# Patient Record
Sex: FEMALE | Race: WHITE | NOT HISPANIC OR LATINO | Employment: FULL TIME | URBAN - METROPOLITAN AREA
[De-identification: names, ages, dates, MRNs, and addresses within clinical notes are randomized per-mention and may not be internally consistent; named-entity substitution may affect disease eponyms.]

---
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
## 2018-05-24 DIAGNOSIS — F41.9 ANXIETY: ICD-10-CM

## 2018-06-26 ENCOUNTER — OFFICE VISIT (OUTPATIENT)
Dept: FAMILY MEDICINE | Facility: CLINIC | Age: 18
End: 2018-06-26
Payer: COMMERCIAL

## 2018-06-26 VITALS
HEART RATE: 69 BPM | DIASTOLIC BLOOD PRESSURE: 72 MMHG | OXYGEN SATURATION: 100 % | SYSTOLIC BLOOD PRESSURE: 118 MMHG | BODY MASS INDEX: 22.35 KG/M2 | RESPIRATION RATE: 16 BRPM | WEIGHT: 147.5 LBS | HEIGHT: 68 IN | TEMPERATURE: 97.2 F

## 2018-06-26 DIAGNOSIS — T14.8XXA MUSCLE STRAIN: Primary | ICD-10-CM

## 2018-06-26 PROCEDURE — 99213 OFFICE O/P EST LOW 20 MIN: CPT | Performed by: FAMILY MEDICINE

## 2018-06-26 RX ORDER — SPIRONOLACTONE 100 MG/1
100 TABLET, FILM COATED ORAL DAILY
COMMUNITY
Start: 2018-04-22 | End: 2020-08-06

## 2018-06-26 RX ORDER — NAPROXEN 500 MG/1
500 TABLET ORAL 2 TIMES DAILY PRN
Qty: 30 TABLET | Refills: 1 | Status: SHIPPED | OUTPATIENT
Start: 2018-06-26 | End: 2019-06-03

## 2018-06-26 NOTE — MR AVS SNAPSHOT
After Visit Summary   6/26/2018    Mireya Yoon    MRN: [id number]           Patient Information     Date Of Birth          2000        Visit Information        Provider Department      6/26/2018 11:50 AM Stephani Bauman MD [facility]        Today's Diagnoses     Muscle strain    -  1      Care Instructions    [facility]    If you have any questions regarding to your visit please contact your care team:       Team Red:   Clinic Hours Telephone Number   Dr. Aisha Alcaraz, NP   7am-7pm  Monday - Thursday   7am-5pm  Fridays  [phone]  (Appointment scheduling available 24/7)    Questions about your recent visit?   Team Line  [phone]   Urgent Care - [place] and [facility] - 11am-9pm Monday-Friday Saturday-Sunday- 9am-5pm   [place] - 5pm-9pm Monday-Friday Saturday-Sunday- 9am-5pm  [phone] - [place]  [phone] - [place]       What options do I have for a visit other than an office visit? We offer electronic visits (e-visits) and telephone visits, when medically appropriate.  Please check with your medical insurance to see if these types of visits are covered, as you will be responsible for any charges that are not paid by your insurance.      You can use [place] (secure electronic communication) to access to your chart, send your primary care provider a message, or make an appointment. Ask a team member how to get started.     For a price quote for your services, please call our Consumer Price Line at [phone] or our Imaging Cost estimation line at [phone] (for imaging tests).              Follow-ups after your visit        Who to contact     If you have questions or need follow up information about today's clinic visit or your schedule please contact [facility] directly at [phone].  Normal or non-critical lab and imaging results will be communicated to you by  "MyChart, letter or phone within 4 business days after the clinic has received the results. If you do not hear from us within 7 days, please contact the clinic through MyChart or phone. If you have a critical or abnormal lab result, we will notify you by phone as soon as possible.  Submit refill requests through [facility] or call your pharmacy and they will forward the refill request to us. Please allow 3 business days for your refill to be completed.          Additional Information About Your Visit        Care EveryWhere ID     This is your Care EveryWhere ID. This could be used by other organizations to access your [facility] medical records  [id number]        Your Vitals Were     Pulse Temperature Respirations Height Pulse Oximetry BMI (Body Mass Index)    69 97.2  F (36.2  C) (Oral) 16 5' 7.72\" (1.72 m) 100% 22.62 kg/m2       Blood Pressure from Last 3 Encounters:   06/26/18 118/72   07/19/17 120/72   12/13/16 124/74    Weight from Last 3 Encounters:   06/26/18 147 lb 8 oz (66.9 kg)   07/19/17 157 lb 12.8 oz (71.6 kg) (87 %)*   12/13/16 165 lb 3.2 oz (74.9 kg) (91 %)*     * Growth percentiles are based on CDC 2-20 Years data.              Today, you had the following     No orders found for display         Today's Medication Changes          These changes are accurate as of 6/26/18 12:17 PM.  If you have any questions, ask your nurse or doctor.               Start taking these medicines.        Dose/Directions    naproxen 500 MG tablet   Commonly known as:  NAPROSYN   Used for:  Muscle strain   Started by:  Stephani Bauman MD        Dose:  500 mg   Take 1 tablet (500 mg) by mouth 2 times daily as needed for moderate pain   Quantity:  30 tablet   Refills:  1            Where to get your medicines      These medications were sent to [facility] [address] [facility] [address] [facility] [address], [facility] [address]     Phone:  [phone]     naproxen 500 MG tablet       "          Primary Care Provider Office Phone # Fax #    Alvin Franklin [phone] [address] [facility] [address]        Equal Access to Services     AMBIKAXIOMARA ELDER : Yamileth eric ken ginnyleeann Arlette, wachingda luqadaha, qaybta kaieshada fozia, davonte haddad laStephanietucker wilson. So [facility] [phone].    ATENCIÓN: Si habla español, tiene a groves disposición servicios gratuitos de asistencia lingüística. Llame al [phone].    We comply with applicable federal civil rights laws and [place] laws. We do not discriminate on the basis of race, color, national origin, age, disability, sex, sexual orientation, or gender identity.            Thank you!     Thank you for choosing [facility]  for your care. Our goal is always to provide you with excellent care. Hearing back from our patients is one way we can continue to improve our services. Please take a few minutes to complete the written survey that you may receive in the mail after your visit with us. Thank you!             Your Updated Medication List - Protect others around you: Learn how to safely use, store and throw away your medicines at www.disposemymeds.org.          This list is accurate as of 6/26/18 12:17 PM.  Always use your most recent med list.                   Brand Name Dispense Instructions for use Diagnosis    etonogestrel 68 MG Impl    IMPLANON/NEXPLANON     1 each (68 mg) by Subdermal route continuous    Nexplanon insertion       ibuprofen 600 MG tablet    ADVIL/MOTRIN    40 tablet    Take 1 tablet (600 mg) by mouth every 8 hours as needed for moderate pain or pain        naproxen 500 MG tablet    NAPROSYN    30 tablet    Take 1 tablet (500 mg) by mouth 2 times daily as needed for moderate pain    Muscle strain       sertraline 50 MG tablet    ZOLOFT    60 tablet    Take 1 tablet (50 mg) by mouth daily    Anxiety       spironolactone 100 MG tablet    ALDACTONE          vitamin D3 2000 units Caps

## 2018-06-26 NOTE — LETTER
[facility]  [address] [place] [address]  [place] [address]  [phone]          June 26, 2018    RE:  Mireya Yoon                                                                                                                                                       [address] [facility] [address]            To whom it may concern:    Mireya Yoon is under my professional care .She was seen today.  Avoid heavy lifting/bending 1 week.    Sincerely,        Stephani Bauman MD

## 2018-06-26 NOTE — PROGRESS NOTES
SUBJECTIVE:   Mireya Yoon is a 18 year old female who presents to clinic today for the following health issues:      Left inguinal area pain    Onset: Patient states she did Gymnastics and Swimming in high school and it started to get irritated then, patient states it would hurt on and off but lately it has been hurting a lot more for a longer period of time. Patient has tired exercising which helped back then but now it has not been helping at all, patient states it hurts more when going up the stairs and walking. Patient walks, bends, carries, and moves a lot at work which has been making it worse.     Description:   Location: Left side hip area   Character: Dull ache and shooting pain     Intensity: moderate.    Progression of Symptoms: better, worse    Accompanying Signs & Symptoms:  Other symptoms: numbness and tingling    History:   Previous similar pain: no       Precipitating factors:   Trauma or overuse: no     Alleviating factors:  Improved by: Advil    Therapies Tried and outcome: Advil, exercising   Pt was Jumping on a Trampoline          Problem list and histories reviewed & adjusted, as indicated.  Additional history: as documented    Patient Active Problem List   Diagnosis     Iron deficiency anemia     Pain in joint, lower leg     Dysmenorrhea     Nexplanon in place     Chest pain, unspecified chest pain type     Anxiety     History reviewed. No pertinent surgical history.    Social History   Substance Use Topics     Smoking status: Never Smoker     Smokeless tobacco: Never Used     Alcohol use No     Family History   Problem Relation Age of Onset     Hypertension Mother      Glaucoma No family hx of      Macular Degeneration No family hx of          Current Outpatient Prescriptions   Medication Sig Dispense Refill     Cholecalciferol (VITAMIN D3) 2000 UNITS CAPS        etonogestrel (IMPLANON/NEXPLANON) 68 MG IMPL 1 each (68 mg) by Subdermal route continuous  0     ibuprofen (ADVIL,MOTRIN) 600 MG  "tablet Take 1 tablet (600 mg) by mouth every 8 hours as needed for moderate pain or pain 40 tablet 0     naproxen (NAPROSYN) 500 MG tablet Take 1 tablet (500 mg) by mouth 2 times daily as needed for moderate pain 30 tablet 1     sertraline (ZOLOFT) 50 MG tablet Take 1 tablet (50 mg) by mouth daily 60 tablet 1     spironolactone (ALDACTONE) 100 MG tablet        No Known Allergies  Recent Labs   Lab Test  09/17/14 2015   TSH  6.54*      BP Readings from Last 3 Encounters:   06/26/18 118/72   07/19/17 120/72   12/13/16 124/74    Wt Readings from Last 3 Encounters:   06/26/18 147 lb 8 oz (66.9 kg)   07/19/17 157 lb 12.8 oz (71.6 kg) (87 %)*   12/13/16 165 lb 3.2 oz (74.9 kg) (91 %)*     * Growth percentiles are based on [facility] 2-20 Years data.                  Labs reviewed in EPIC    Reviewed and updated as needed this visit by clinical staff       Reviewed and updated as needed this visit by Provider         ROS:  Rest of the  Pertinent ROS is Negative except see above and Problem list [stable]      OBJECTIVE:     /72  Pulse 69  Temp 97.2  F (36.2  C) (Oral)  Resp 16  Ht 5' 7.72\" (1.72 m)  Wt 147 lb 8 oz (66.9 kg)  SpO2 100%  BMI 22.62 kg/m2  Body mass index is 22.62 kg/(m^2).  GENERAL: healthy, alert and no distress  ABDOMEN: soft, nontender, no hepatosplenomegaly, no masses and bowel sounds normal  MS: no gross musculoskeletal defects noted, no edema  MS: mild tenderness left Inguinal Ligament area  No swelling  Pain with movement    Diagnostic Test Results:  none     ASSESSMENT/PLAN:       1. Muscle strain  SEE EPIC care orders  The potential side effects of this medication have been discussed with the patient.  Call if any significant problems with these are experienced.    - naproxen (NAPROSYN) 500 MG tablet; Take 1 tablet (500 mg) by mouth 2 times daily as needed for moderate pain  Dispense: 30 tablet; Refill: 1  Rest  Note for work  Follow up if not better  Stephani Bauman MD  [facility] " [place]

## 2018-06-26 NOTE — PATIENT INSTRUCTIONS
[facility]    If you have any questions regarding to your visit please contact your care team:       Team Red:   Clinic Hours Telephone Number   Dr. Aisha Alcaraz, NP   7am-7pm  Monday - Thursday   7am-5pm  Fridays  [phone]  (Appointment scheduling available 24/7)    Questions about your recent visit?   Team Line  [phone]   Urgent Care - [place] and [facility] - 11am-9pm Monday-Friday Saturday-Sunday- 9am-5pm   [place] - 5pm-9pm Monday-Friday Saturday-Sunday- 9am-5pm  [phone] - [place]  [phone] - [place]       What options do I have for a visit other than an office visit? We offer electronic visits (e-visits) and telephone visits, when medically appropriate.  Please check with your medical insurance to see if these types of visits are covered, as you will be responsible for any charges that are not paid by your insurance.      You can use [facility] (secure electronic communication) to access to your chart, send your primary care provider a message, or make an appointment. Ask a team member how to get started.     For a price quote for your services, please call our Consumer Price Line at [phone] or our Imaging Cost estimation line at [phone] (for imaging tests).

## 2018-07-05 ENCOUNTER — TELEPHONE (OUTPATIENT)
Dept: PEDIATRICS | Facility: CLINIC | Age: 18
End: 2018-07-05

## 2018-07-05 NOTE — TELEPHONE ENCOUNTER
Spoke to mom.  She has received a call from previous MD who is prescribing med.  No further action needed. Radha Naik RN

## 2018-12-13 ENCOUNTER — OFFICE VISIT (OUTPATIENT)
Dept: OBGYN | Facility: CLINIC | Age: 18
End: 2018-12-13
Payer: COMMERCIAL

## 2018-12-13 VITALS
TEMPERATURE: 97.7 F | BODY MASS INDEX: 25.45 KG/M2 | DIASTOLIC BLOOD PRESSURE: 73 MMHG | HEART RATE: 166 BPM | WEIGHT: 166 LBS | SYSTOLIC BLOOD PRESSURE: 127 MMHG

## 2018-12-13 DIAGNOSIS — Z30.017 INSERTION OF NEXPLANON: Primary | ICD-10-CM

## 2018-12-13 DIAGNOSIS — Z30.46 ENCOUNTER FOR NEXPLANON REMOVAL: ICD-10-CM

## 2018-12-13 PROCEDURE — 11983 REMOVE/INSERT DRUG IMPLANT: CPT | Performed by: OBSTETRICS & GYNECOLOGY

## 2018-12-13 NOTE — PROGRESS NOTES
GYN CLINIC VISIT  2018    CC: removal of Nexplanon    HPI:   Mireya Yoon is a 18 year old  who presents to clinic today to have her Nexplanon removed. It was inserted on 2016 in her left side arm. She desires Nexplanon removal because it is due for removal. She is home from college on break and would like it replaced with a new one. It is working well for her. Light irregular bleeding, decreased cramps.     Reviewed PMH, PSH, social and family history. Changes made in EPIC.    OBJECTIVE:  Vitals:    18 1305   BP: 127/73   Pulse: 166   Temp: 97.7  F (36.5  C)   TempSrc: Oral   Weight: 75.3 kg (166 lb)       Gen: alert, oriented, no distress, very pleasant    PROCEDURE: Nexplanon removal  Verbal and written consent obtained. Discussed risk of bleeding, infection, inability to remove device and bruising. Nexplanon device was palpated in her left side arm. The area was cleansed with betadine x3. Sterile gloves were donned. Then lidocaine 1% was injected under the skin at the distal end of the device. A 2mm incision was made with a scalpel over the distal end of the device, then the device was grasped with a sterile Najma clamp. The fibrous sheath encasing the Nexplanon was incised with a scalpel, then the device was removed without difficulty, intact. The incision was covered with a band-aid and the arm was wrapped with a pressure dressing. Patient tolerated procedure well.         PROCEDURE: Nexplanon Insertion  Before insertion it was confirmed that Mireya Yoon is not pregnant nor has any other contraindication for the use of Nexplanon (no known or suspected pregnancy, no current or past history of thrombosis or thromboembolic disorders, no liver tumors, no undiagnosed abnormal genital bleeding, no known or suspected breast cancer or progestin-sensitive cancer, and no allergic reaction to any components of nexplanon.)      She understands the benefits and risks of Nexplanon.  She is explained the  most common adverse reactions reported in clinical trials were change in menstrual bleeding pattern, headache, vaginitis, weight increase, acne, breast pain, abdominal pain, and pharyngitis. The patient has received a copy of the Patient Labeling included in packaging. Consent and been reviewed and completed. Patient has no allergies to the antiseptic and anesthetic to be used during insertion.      A single NEXPLANON implant was inserted subdermally in the upper left side arm.     After the Nexplanon was removed, the insertion area was anesthetized with an additional 2 mL of  1% lidocaine.  The skin was stretched at insertion site and the nexplanon applicator inserted at 30 degrees.  The position of the implant was confirmed immediately after insertion by palpation.  Bandage placed over implant site.  Mireya Yoon was able to palpate the implant herself.  Pressure bandage placed with a sterile guaze to minimize bruising.  The patient was instructed to remove the pressure dressing in 24 hours and place a normal bandage over the insertion site for 3-5 days post-insertion.  The patient tolerated the procedure well.    NDC 4684-2316-32  Lot [id number], Exp 2021     ASSESSMENT:  18 year old  had a Nexplanon removed and new one inserted today in her LEFT upper arm without complication.    PLAN:  1. Insertion of Nexplanon  - etonogestrel (IMPLANON/NEXPLANON) subdermal implant 68 mg; 1 each (68 mg) by Subdermal route continuous  - INSERTION NON-BIODEGRADABLE DRUG DELIVERY IMPLANT  - ETONOGESTREL IMPLANT SYSTEM    2. Encounter for Nexplanon removal  - REMOVAL NEXPLANON    Harriett Wiggins MD

## 2018-12-13 NOTE — NURSING NOTE
"Chief Complaint   Patient presents with     Nexplanon removal and Replace     NDC:6077-0910-27 LOT# [id number] EXP: 2021       Initial /73   Pulse 166   Temp 97.7  F (36.5  C) (Oral)   Wt 75.3 kg (166 lb)   BMI 25.45 kg/m   Estimated body mass index is 25.45 kg/m  as calculated from the following:    Height as of 18: 1.72 m (5' 7.72\").    Weight as of this encounter: 75.3 kg (166 lb).  BP completed using cuff size: regular    Questioned patient about current smoking habits.  Pt. has never smoked.          The following HM Due: NONE      The following patient reported/Care Every where data was sent to:  P ABSTRACT QUALITY INITIATIVES [62312]  NA      n/a              "

## 2018-12-18 ENCOUNTER — OFFICE VISIT (OUTPATIENT)
Dept: PEDIATRICS | Facility: CLINIC | Age: 18
End: 2018-12-18
Payer: COMMERCIAL

## 2018-12-18 VITALS
HEIGHT: 67 IN | BODY MASS INDEX: 25.49 KG/M2 | TEMPERATURE: 98.3 F | WEIGHT: 162.4 LBS | DIASTOLIC BLOOD PRESSURE: 83 MMHG | SYSTOLIC BLOOD PRESSURE: 123 MMHG | HEART RATE: 83 BPM

## 2018-12-18 DIAGNOSIS — Z23 ENCOUNTER FOR IMMUNIZATION: ICD-10-CM

## 2018-12-18 DIAGNOSIS — F41.1 GAD (GENERALIZED ANXIETY DISORDER): ICD-10-CM

## 2018-12-18 DIAGNOSIS — Z00.00 HEALTHCARE MAINTENANCE: Primary | ICD-10-CM

## 2018-12-18 DIAGNOSIS — E61.1 IRON DEFICIENCY: ICD-10-CM

## 2018-12-18 LAB — HGB BLD-MCNC: 12.8 G/DL (ref 11.7–15.7)

## 2018-12-18 PROCEDURE — 36415 COLL VENOUS BLD VENIPUNCTURE: CPT | Performed by: PEDIATRICS

## 2018-12-18 PROCEDURE — 90471 IMMUNIZATION ADMIN: CPT | Performed by: PEDIATRICS

## 2018-12-18 PROCEDURE — 99213 OFFICE O/P EST LOW 20 MIN: CPT | Mod: 25 | Performed by: PEDIATRICS

## 2018-12-18 PROCEDURE — 87491 CHLMYD TRACH DNA AMP PROBE: CPT | Performed by: PEDIATRICS

## 2018-12-18 PROCEDURE — 83550 IRON BINDING TEST: CPT | Performed by: PEDIATRICS

## 2018-12-18 PROCEDURE — 85018 HEMOGLOBIN: CPT | Performed by: PEDIATRICS

## 2018-12-18 PROCEDURE — 90686 IIV4 VACC NO PRSV 0.5 ML IM: CPT | Performed by: PEDIATRICS

## 2018-12-18 PROCEDURE — 99395 PREV VISIT EST AGE 18-39: CPT | Mod: 25 | Performed by: PEDIATRICS

## 2018-12-18 PROCEDURE — 87591 N.GONORRHOEAE DNA AMP PROB: CPT | Performed by: PEDIATRICS

## 2018-12-18 PROCEDURE — 83540 ASSAY OF IRON: CPT | Performed by: PEDIATRICS

## 2018-12-18 RX ORDER — SERTRALINE HYDROCHLORIDE 100 MG/1
100 TABLET, FILM COATED ORAL DAILY
Qty: 90 TABLET | Refills: 3 | Status: SHIPPED | OUTPATIENT
Start: 2018-12-18 | End: 2019-06-03 | Stop reason: DRUGHIGH

## 2018-12-18 ASSESSMENT — ANXIETY QUESTIONNAIRES
3. WORRYING TOO MUCH ABOUT DIFFERENT THINGS: NEARLY EVERY DAY
2. NOT BEING ABLE TO STOP OR CONTROL WORRYING: NEARLY EVERY DAY
IF YOU CHECKED OFF ANY PROBLEMS ON THIS QUESTIONNAIRE, HOW DIFFICULT HAVE THESE PROBLEMS MADE IT FOR YOU TO DO YOUR WORK, TAKE CARE OF THINGS AT HOME, OR GET ALONG WITH OTHER PEOPLE: VERY DIFFICULT
1. FEELING NERVOUS, ANXIOUS, OR ON EDGE: NEARLY EVERY DAY
7. FEELING AFRAID AS IF SOMETHING AWFUL MIGHT HAPPEN: MORE THAN HALF THE DAYS
GAD7 TOTAL SCORE: 16
5. BEING SO RESTLESS THAT IT IS HARD TO SIT STILL: NOT AT ALL
6. BECOMING EASILY ANNOYED OR IRRITABLE: NEARLY EVERY DAY

## 2018-12-18 ASSESSMENT — MIFFLIN-ST. JEOR: SCORE: 1536.88

## 2018-12-18 ASSESSMENT — PATIENT HEALTH QUESTIONNAIRE - PHQ9: 5. POOR APPETITE OR OVEREATING: MORE THAN HALF THE DAYS

## 2018-12-18 NOTE — PROGRESS NOTES
SUBJECTIVE:   Mireya Yoon is a 18 year old female, here for a routine health maintenance visit,   accompanied by her mother.    Patient was roomed by: PHOENIX Alex    Do you have any forms to be completed?  no    SOCIAL HISTORY  Family members in house: mother, father and 3 brothers  Language(s) spoken at home: English  Recent family changes/social stressors: school    SAFETY/HEALTH RISKS  TB exposure:           None  Cardiac risk assessment:     Family history (males <55, females <65) of angina (chest pain), heart attack, heart surgery for clogged arteries, or stroke: no    Biological parent(s) with a total cholesterol over 240:  no    DENTAL  Water source:  city water  Does your child have a dental provider: Yes  Has your child seen a dentist in the last 6 months: Yes  Dental health HIGH risk factors: none    Dental visit recommended: Yes    Sports Physical:  No sports physical needed.    VISION :  Testing not done; patient has seen eye doctor in the past 12 months.    HEARING   Right Ear:      1000 Hz RESPONSE- on Level: 40 db (Conditioning sound)   1000 Hz: RESPONSE- on Level:   20 db    2000 Hz: RESPONSE- on Level:   20 db    4000 Hz: RESPONSE- on Level:   20 db    6000 Hz: RESPONSE- on Level:   20 db     Left Ear:      6000 Hz: RESPONSE- on Level:   20 db    4000 Hz: RESPONSE- on Level:   20 db    2000 Hz: RESPONSE- on Level:   20 db    1000 Hz: RESPONSE- on Level:   20 db      500 Hz: RESPONSE- on Level: 25 db    Right Ear:       500 Hz: RESPONSE- on Level: 25 db    Hearing Acuity: Pass    Hearing Assessment: normal    HOME  No concerns    EDUCATION  School:  [employer], later [employer]  Days of school missed: 5 or fewer    SAFETY  Driving:  Seat belt always worn:  Yes  Helmet worn for bicycle/roller blades/skateboard:  NO  Guns/firearms in the home: No  No safety concerns    ACTIVITIES  Do you get at least 60 minutes per day of physical activity, including time in and out of school: NO  Extracurricular  activities: none  Organized team sports: none    ELECTRONIC MEDIA  Media use: >2 hours/ day    DIET  Do you get at least 4 helpings of a fruit or vegetable every day: Yes  How many servings of juice, non-diet soda, punch or sports drinks per day: none    PSYCHO-SOCIAL/DEPRESSION  General screening:  Electronic PSC No flowsheet data found.   no followup necessary  No concerns     SLEEP  Sleep concerns: Sleeping concerns, pt is sleeping a lot and is always tired. Pt wants iron/hemoglobin check.   Bedtime on a school night: none  Wake up time for school: Depends on the classes   Sleep duration on a school night (hours/night): During school: 8 hours.   Difficulty shutting off thoughts at night: No  Daytime naps: YES    QUESTIONS/CONCERNS:  -Has a history of low hemoglobin and low iron, in the past when she has started to chew on ice she has known that her iron was low.  This behavior has increased recently.  She did start taking the ferrous sulfate compound that she bought at the [employer] while at school but is unsure of how much elemental iron was in the product.  She does not think that this product help to improve her eyestrain behaviors.  She feels moderately fatigued.  No abnormal bruising.  Her menstrual period is light to moderate flow.    DRUGS  Smoking:  no  Passive smoke exposure:  no  Alcohol:  no  Drugs:  no    HEADS:  HOME: Currently home for the holidays and living with her parents, however she currently attends school in [employer] which she enjoys immensely.  She lives in a quad with 4 other females 1 of which is her good friends.  She reports feeling safe in this environment although she wishes her roommates were .    EDUCATION/EMPLOYMENT:  Currently in her third year of college, majoring in psychology and planning to get her PhD and become an early childhood psychologist.  Her mother worked in this field and she feels it will be a good fit for her especially given her history with anxiety.   She states that she overall does very well in school and is in follow-up with both the honor society as well as multiple multiple leadership programs.  She plans to finish college in 4 years and either take a year off or move on to graduate school at that time.    ACTIVITIES  - She is currently active through rec sports at her college, she states that she Goes to spin classes and walks often with her boyfriend around campus.  She thinks that she exercises at least 3 days a week.  She is also very involved in extracurricular activities per report.  - She states that she has multiple close friends however her for closest friends have recently become less close as she has found them to be more irritating than previously.  She states that when she feels irritated she leaves and goes to a coffee shop which she finds to be a safe space.  She feels that overall these relationships have been stable although not as good as it used to be.    ANTONIO Gomes cooks most of her own meals and the apartment in her kitchen and feels that her diet has significantly improved since leaving the dorms and getting her own kitchen  She feels that she eats lots of vegetables and really enjoys cooking for herself and her boyfriend.  She lives close to a grocery store.     DRUGS  Denies any substance abuse, including tobacco, marijuana, very minimal EtOH intake as she does not like the way it makes her feel.  She does occasionally have a glass of wine.  She denies any prescription drug use or other substance use    SEXUALITY  -currently sexually active with her boyfriend who she has been with for greater than 1 year.  She currently has a Nexplanon for pregnancy prevention as well as using condoms consistently.  She feels that she uses them 100% of the time.  She reports feeling safe in this relationship and that both she and her boyfriend have very healthy relationship in which they both grow together and I working towards success in independent  yet productive ways.  -She does report intermittently a feeling of cramping and pain near her right inguinal canal during intercourse.  This is intermittent and she is unsure of how long it lasts but it usually happens with penile insertion and improves after intercourse is completed.  -She was most recently tested for STI's in September through the school program.  Her partner was also tested at this time.  She has no concerns for STI's today, but agrees to testing.  -She did recently have her Nexplanon replaced and had some cramping for the last couple days after this procedure which has slowly started to improve.  -Last menstrual menstrual period was in the middle of October although this is not unusual for her as she does not get periods related to her Nexplanon.  She has no concerns about being pregnant at this time.  -She does not report any non-consensual sexual encounters.    SUICIDALITY/DEPRESSION/SAFETY  Mireya does have a history of generalized anxiety disorder.  She reports that this became much worse this summer while she was at home at which time she sought care from a nurse practitioner at primary care.  At that time she was prescribed both her continued sertraline as well as hydroxyzine.  She also initiated therapy with a psychologist here in the [facility], she continued this relationship with new therapists at school who she states are mostly helpful.  She has continued to take her sertraline although she recently ran out of refills and has been taking 50 mg for the last 2 weeks instead of 100 mg.  She feels this medication is helpful in general and she has not needed to use her Atarax in several weeks.  -She identifies both her mother and her boyfriend as her go to people for when she is feeling anxious, agitated, sad.  -She denies SI, HI, self-injurious behaviors.  She states her mood is typically good, she does not have periods of significant sadness.   -Overall she feels that her anxiety is well  controlled at this time.    MENSTRUAL HISTORY  Normal       PROBLEM LIST  Patient Active Problem List   Diagnosis     Iron deficiency anemia     Pain in joint, lower leg     Dysmenorrhea     Nexplanon in place     Chest pain, unspecified chest pain type     Anxiety     MEDICATIONS  Current Outpatient Medications   Medication Sig Dispense Refill     sertraline (ZOLOFT) 100 MG tablet Take 1 tablet (100 mg) by mouth daily 90 tablet 3     Cholecalciferol (VITAMIN D3) 2000 UNITS CAPS        etonogestrel (IMPLANON/NEXPLANON) 68 MG IMPL 1 each (68 mg) by Subdermal route continuous  0     ibuprofen (ADVIL,MOTRIN) 600 MG tablet Take 1 tablet (600 mg) by mouth every 8 hours as needed for moderate pain or pain 40 tablet 0     naproxen (NAPROSYN) 500 MG tablet Take 1 tablet (500 mg) by mouth 2 times daily as needed for moderate pain 30 tablet 1     sertraline (ZOLOFT) 50 MG tablet Take 1 tablet (50 mg) by mouth daily 60 tablet 1     spironolactone (ALDACTONE) 100 MG tablet         ALLERGY  No Known Allergies    IMMUNIZATIONS  Immunization History   Administered Date(s) Administered     DTAP (<7y) 1998, 1998, 01/06/1999, 10/06/1999, 09/03/2003     HEPA 08/19/2011, 09/01/2015     HPV 09/17/2009, 11/19/2010, 09/28/2012     HepB 1998, 1998, 04/14/1999     Hib (PRP-T) 1998, 1998, 01/06/1999, 10/06/1999     Influenza (IIV3) PF 02/20/2007, 09/17/2009, 11/19/2010     Influenza Intranasal Vaccine 09/28/2012     Influenza Vaccine IM 3yrs+ 4 Valent IIV4 09/17/2014, 11/04/2015, 12/18/2018     MMR 07/28/1999, 08/23/2002     Meningococcal (Bexsero ) 07/19/2017     Meningococcal (Menactra ) 07/19/2017     Meningococcal (Menomune ) 08/19/2011, 09/28/2012     Poliovirus, inactivated (IPV) 1998, 1998, 10/06/1999, 09/03/2003     Tdap (Adacel,Boostrix) 09/17/2009     Varicella 07/28/1999, 08/25/2008       HEALTH HISTORY SINCE LAST VISIT  No surgery, major illness or injury since last physical  "exam    ROS  Constitutional, eye, ENT, skin, respiratory, cardiac, GI, MSK, neuro, and allergy are normal except as otherwise noted.    OBJECTIVE:   EXAM  /83   Pulse 83   Temp 98.3  F (36.8  C) (Oral)   Ht 5' 6.85\" (1.698 m)   Wt 162 lb 6.4 oz (73.7 kg)   LMP 11/01/2018 (Within Days)   Breastfeeding? No   BMI 25.55 kg/m    Normalized stature-for-age data not available for patients older than 20 years.  Normalized weight-for-age data not available for patients older than 20 years.  Normalized BMI data available only for age 0 to 20 years.  Normalized stature-for-age data not available for patients older than 20 years.  GENERAL: Active, alert, in no acute distress. mildly anxious  SKIN: Clear. No significant rash, abnormal pigmentation or lesions. Bruise on left bicep at nexplanon insertion site.  HEAD: Normocephalic  EYES: Pupils equal, round, reactive, Extraocular muscles intact. Normal conjunctivae. Hyperchromatic birth flor on lateral right iris  EARS: Normal canals. Tympanic membranes are normal; gray and translucent.  NOSE: Normal without discharge.  MOUTH/THROAT: Clear. No oral lesions. Teeth without obvious abnormalities.  NECK: Supple, no masses.  No thyromegaly.  LYMPH NODES: No adenopathy  LUNGS: Clear. No rales, rhonchi, wheezing or retractions  HEART: Regular rhythm. Normal S1/S2. No murmurs. Normal pulses.  ABDOMEN: Soft, non-tender, not distended, no masses or hepatosplenomegaly. Bowel sounds normal.   EXTREMITIES: Full range of motion, no deformities  PSYCH: Slightly anxious, but calm appearing.  Interactive.  Logical thought processes without tangential thoughts.  No pressured speech.  Insight into feelings intact.    Hg 12.8  Iron studies: pending  STI Gonorrhea/Chlamydia: pending    ASSESSMENT/PLAN:   1. Iron deficiency  History of iron deficiency, recent fatigue and extreme behaviors will check iron levels today and start supplementation if low.  - Hemoglobin  - Iron and iron binding " capacity    2. Healthcare maintenance  Given history of sexual activity will collect today.   - Neisseria gonorrhoeae PCR  - Chlamydia trachomatis PCR    3. MARY (generalized anxiety disorder)  Overall well controlled has not needed rescue medications in several months.  Encouraged patient to check in with nurse practitioner who she saw this summer as well as continuing with her psychologist at school when she returns for the spring semester.  Patient seems to be doing well overall and was able to identify several coping mechanisms to manage her anxiety.  - sertraline (ZOLOFT) 100 MG tablet; Take 1 tablet (100 mg) by mouth daily  Dispense: 90 tablet; Refill: 3    4. Encounter for immunization  - ADMIN 1st VACCINE    5. Pain with intercourse: Discussed this with patient given mechanism and given description I feel this is likely a mechanical pain related to position.  Discussed options for changing position as well as stretching with the patient.  Given that she has multiple negative STI tests have very low suspicion for infectious concerns at this time.  Will collect GC swab just to be sure.     Anticipatory Guidance  The following topics were discussed:  SOCIAL/ FAMILY: Seems to have a good connection with both her mother and her other family members.  Encouraged identification of social supports and different geographical situations  NUTRITION: Discussed cooking healthy meals  HEALTH / SAFETY:  Feels safe in her environment encouraged safe behaviors.  SEXUALITY: In a committed relationship discussed STI testing as well as pregnancy prevention today.    Preventive Care Plan  Immunizations    See orders in EpicCare.  I reviewed the signs and symptoms of adverse effects and when to seek medical care if they should arise.  Referrals/Ongoing Specialty care: No   See other orders in EpicCare.  Cleared for sports:  Yes  BMI at Normalized BMI data available only for age 0 to 20 years.  No weight concerns.  Dyslipidemia  risk:    None    FOLLOW-UP:    in 1 year for a Preventive Care visit    Resources  HPV and Cancer Prevention:  What Parents Should Know  What Kids Should Know About HPV and Cancer  Goal Tracker: Be More Active  Goal Tracker: Less Screen Time  Goal Tracker: Drink More Water  Goal Tracker: Eat More Fruits and Veggies  [place] Child and Teen Checkups (C&TC) Schedule of Age-Related Screening Standards      Latoya Lugo MD  Pediatrics, PL-2    Attending:  Patient seen, examined and discussed with resident.  Agree with above assessment and plan.  Spent 15 minutes of the visit in face-to face counseling regarding issues documented above in note by resident.      Alvin Franklin MD  [facility]

## 2018-12-19 LAB
C TRACH DNA SPEC QL NAA+PROBE: NEGATIVE
IRON SATN MFR SERPL: 17 % (ref 15–46)
IRON SERPL-MCNC: 49 UG/DL (ref 35–180)
N GONORRHOEA DNA SPEC QL NAA+PROBE: NEGATIVE
SPECIMEN SOURCE: NORMAL
SPECIMEN SOURCE: NORMAL
TIBC SERPL-MCNC: 295 UG/DL (ref 240–430)

## 2018-12-19 ASSESSMENT — ANXIETY QUESTIONNAIRES: GAD7 TOTAL SCORE: 16

## 2018-12-19 NOTE — PATIENT INSTRUCTIONS
-Continue taking sertraline (zoloft) 100mg every day  -Continue to see your therapist at school to work on strategies for coping with your anxiety  -We will call with the result of your iron studies and hemoglobin

## 2018-12-20 ENCOUNTER — TELEPHONE (OUTPATIENT)
Dept: PEDIATRICS | Facility: CLINIC | Age: 18
End: 2018-12-20

## 2018-12-20 NOTE — TELEPHONE ENCOUNTER
Reason for Call:  Request for results: Receive call from patient requesting lab results of recent testing    Name of test or procedure: labs completed 12/18/2018    Date of test of procedure: 12/18/2018    Location of the test or procedure: [facility]    OK to leave the result message on voice mail or with a family member? Not Applicable    Phone number Patient can be reached at:  Home number on file [phone] (home)    Additional comments: Please return call with lab results    Call taken on 12/20/2018 at 4:01 PM by Ileana Yoder

## 2019-02-20 ENCOUNTER — TELEPHONE (OUTPATIENT)
Dept: PEDIATRICS | Facility: CLINIC | Age: 19
End: 2019-02-20

## 2019-02-20 NOTE — TELEPHONE ENCOUNTER
Pt is wanting to speak with  or a RN about the abdominal pain. Mireya went to the ER for the pain and they had found a vaginal cyst. While in for her last visit at [facility] she had discuss with Dr. Franklin the pain she has when having intercourse. The pain has progressed and that is why she had gone to the ER. While at the ER they had advised pt to see a gynecologist, but patient is out of the state due to school. Mireya is wanting to discuss what her next steps should be and if Dr. Franklin is able to prescribe any medications for the pain. Please call Mireya to discuss at [phone].     Ida Whitaker,

## 2019-02-20 NOTE — TELEPHONE ENCOUNTER
Mom returning call from Dr. Franklin.  Please have Dr. Franklin call mom back at [phone].  Patient has given permission for mom to speak with the doctor. Thank you.    Prisca CEDILLO.  Patient Representative  [place]

## 2019-02-20 NOTE — TELEPHONE ENCOUNTER
"I called Mireya who shared that she went to the ED when she was having severe abdominal pain, and was found to have a 3.5 cm cyst on one ovary, and a burst cyst on the other.  I then told her that I wanted her to see a gyn doctor to see if the other cyst needs to be drained to avoid ovarian torsion.    I then called Mireya's mother (with her permission) and shared my thoughts.  I recommended that Mireya call her insurance and try to be seen by a gyn doctor in [place] as soon as she is able.  If, however, her insurance won't cover that visit, Mireya will make an appointment to see her gyn doctor in [place] for when she's home over spring break, which is in three weeks.  In the meantime, she understands that if she has severe abdominal pain, she needs to go to the ED again, since it could be a torsed ovary, and that can be a \"surgical emergency\".  Both Mireya and her mother agree with the plan.  "

## 2019-02-20 NOTE — TELEPHONE ENCOUNTER
Spoke to patient to r/o emergency abdominal symptoms but am unable to triage as patient is out of state. Able to walk and talk, no vomiting or fever. Not sever abdominal pain, pain is crampy.  Routing high priority to Dr. Franklin.    Debbie Spicer RN

## 2019-03-12 ENCOUNTER — ANCILLARY PROCEDURE (OUTPATIENT)
Dept: ULTRASOUND IMAGING | Facility: CLINIC | Age: 19
End: 2019-03-12
Attending: OBSTETRICS & GYNECOLOGY
Payer: COMMERCIAL

## 2019-03-12 ENCOUNTER — OFFICE VISIT (OUTPATIENT)
Dept: OBGYN | Facility: CLINIC | Age: 19
End: 2019-03-12
Payer: COMMERCIAL

## 2019-03-12 VITALS
DIASTOLIC BLOOD PRESSURE: 75 MMHG | OXYGEN SATURATION: 100 % | HEART RATE: 88 BPM | WEIGHT: 161 LBS | BODY MASS INDEX: 25.33 KG/M2 | SYSTOLIC BLOOD PRESSURE: 143 MMHG

## 2019-03-12 DIAGNOSIS — R10.2 PELVIC PAIN IN FEMALE: Primary | ICD-10-CM

## 2019-03-12 DIAGNOSIS — N83.202 LEFT OVARIAN CYST: ICD-10-CM

## 2019-03-12 PROCEDURE — 99203 OFFICE O/P NEW LOW 30 MIN: CPT | Performed by: OBSTETRICS & GYNECOLOGY

## 2019-03-12 PROCEDURE — 76830 TRANSVAGINAL US NON-OB: CPT | Performed by: OBSTETRICS & GYNECOLOGY

## 2019-03-12 NOTE — PROGRESS NOTES
SUBJECTIVE:  Mireya Yoon is a 18 year old  who presents to evaluate pelvic pain.  She goes to school in [employer], on  developed lower abdominal pain, on  presented to the emergency room.  Pain was described as right lower quadrant, but she reports that it was actually diffuse lower abdominal pain.  She also notes that she has for a long time been experiencing pain with intercourse and with insertion of tampons.  Extensive evaluation in the emergency department included a CT scan which showed a 3 cm adnexal left adnexal mass, and free fluid in the pelvis.  An ultrasound was done which showed a normal pelvis, including a 3 cm dominant follicle in the left ovary and no free fluid in the pelvis.  The appendix was visualized and was normal.  Pregnancy test was negative.  She came home to evaluate further as she reports that she still has pain all over her abdomen, can be sharp on the left, and is as bad as it was when she went to the emergency room.  She has had no fevers.  She has a Nexplanon which was changed in December.  She reports normal bowel movements, no vomiting.    Her sister and her mother accompany her.    OBJECTIVE: /75   Pulse 88   Wt 73 kg (161 lb)   LMP 2019   SpO2 100%   BMI 25.33 kg/m         Patient was not examined.    Transvaginal ultrasound showed a normal pelvis, with normal ovaries bilaterally, normal blood flow to the ovaries, no ovarian cysts.  Small follicles were seen in the ovaries.  A trace of fluid was noted in the cul-de-sac.    ASSESSMENT: Pelvic pain, likely ruptured ovarian cyst with small amount of free fluid, possibly blood in the pelvis, now reabsorbing.  No evidence of ovarian torsion.    PLAN: We discussed these findings in detail.  We discussed likely slow but certain improvement in her lower abdominal pain as her body reabsorbs any intra-abdominal fluid status post rupture of cyst.  She will monitor closely.  She is invited to  return here when she comes home for the summer if she wishes to further investigate the symptoms of dyspareunia and difficulty with tampon insertion that she has been experiencing.    Please note greater than 50% of this 30 minute appointment were spent in counseling with the patient of the issues described above in the history of present illness and in the plan, including evaluation and managment of pelvic pain, ovarian cyst.

## 2019-03-13 ENCOUNTER — OFFICE VISIT (OUTPATIENT)
Dept: OPTOMETRY | Facility: CLINIC | Age: 19
End: 2019-03-13
Payer: COMMERCIAL

## 2019-03-13 DIAGNOSIS — Z01.00 ENCOUNTER FOR EXAMINATION OF EYES AND VISION WITHOUT ABNORMAL FINDINGS: Primary | ICD-10-CM

## 2019-03-13 DIAGNOSIS — H52.13 MYOPIA OF BOTH EYES: ICD-10-CM

## 2019-03-13 DIAGNOSIS — H52.221 REGULAR ASTIGMATISM OF RIGHT EYE: ICD-10-CM

## 2019-03-13 PROCEDURE — 92015 DETERMINE REFRACTIVE STATE: CPT | Performed by: OPTOMETRIST

## 2019-03-13 PROCEDURE — 92004 COMPRE OPH EXAM NEW PT 1/>: CPT | Performed by: OPTOMETRIST

## 2019-03-13 ASSESSMENT — SLIT LAMP EXAM - LIDS
COMMENTS: NORMAL
COMMENTS: NORMAL

## 2019-03-13 ASSESSMENT — CONF VISUAL FIELD
OS_NORMAL: 1
OD_NORMAL: 1

## 2019-03-13 ASSESSMENT — TONOMETRY
OD_IOP_MMHG: 18
OS_IOP_MMHG: 18
IOP_METHOD: APPLANATION

## 2019-03-13 ASSESSMENT — REFRACTION_MANIFEST
OS_SPHERE: -3.50
OD_CYLINDER: +0.75
OS_CYLINDER: SPHERE
OD_AXIS: 015
OD_SPHERE: -3.00

## 2019-03-13 ASSESSMENT — VISUAL ACUITY
OS_CC: 20/20
METHOD: SNELLEN - LINEAR
OS_CC: 20/20
OS_SC: 20/400
OD_CC: 20/20
OS_SC: 20/20
OD_SC: 20/20
CORRECTION_TYPE: GLASSES
OD_SC: 20/400
OD_CC: 20/20
OS_CC+: -2

## 2019-03-13 ASSESSMENT — REFRACTION_WEARINGRX
OD_CYLINDER: +0.50
OD_SPHERE: -2.75
SPECS_TYPE: SVL
OS_SPHERE: -3.00
OS_CYLINDER: +0.75
OS_AXIS: 135
OD_AXIS: 005

## 2019-03-13 ASSESSMENT — EXTERNAL EXAM - LEFT EYE: OS_EXAM: NORMAL

## 2019-03-13 ASSESSMENT — CUP TO DISC RATIO
OD_RATIO: 0.2
OS_RATIO: 0.2

## 2019-03-13 ASSESSMENT — EXTERNAL EXAM - RIGHT EYE: OD_EXAM: NORMAL

## 2019-03-13 NOTE — LETTER
3/13/2019         RE: Mireya Yoon  [address]  [place] [address]        Dear Colleague,    Thank you for referring your patient, Mireya Yoon, to the [facility] ER. Please see a copy of my visit note below.    Chief Complaint   Patient presents with     Annual Eye Exam      Accompanied by self  Last Eye Exam: 1 year ago  Dilated Previously: Yes    What are you currently using to see?  Glasses- 1 year old and contacts, patient doesn't want to be fitted today-patient doesn't wear contacts that often.       Distance Vision Acuity: Noticed gradual change in both eyes    Near Vision Acuity: Satisfied with vision while reading  unaided    Eye Comfort: good  Do you use eye drops? : No  Occupation or Hobbies: student    Anne Marie Ruvalcaba, Opt[employer] Tech          Medical, surgical and family histories reviewed and updated 3/13/2019.       OBJECTIVE: See Ophthalmology exam    ASSESSMENT:    ICD-10-CM    1. Encounter for examination of eyes and vision without abnormal findings Z01.00 EYE EXAM (SIMPLE-NONBILLABLE)   2. Myopia of both eyes H52.13 EYE EXAM (SIMPLE-NONBILLABLE)     REFRACTION   3. Regular astigmatism of right eye H52.221 EYE EXAM (SIMPLE-NONBILLABLE)     REFRACTION      PLAN:     Patient Instructions   Mireya was advised of today's exam findings.  Optional to fill new glasses prescription, minimal change  Copy of glasses Rx provided today.  Return in 1-2 years for eye exam, or sooner if needed.    The affects of the dilating drops last for 4- 6 hours.  You will be more sensitive to light and vision will be blurry up close.  Mydriatic sunglasses were given if needed.      Sailnas Carlin O.D.  [facility]  [address] [place] [address]. [place]  [address]    [phone]           Again, thank you for allowing me to participate in the care of your patient.        Sincerely,        Salinas Carlin, OD

## 2019-03-13 NOTE — PATIENT INSTRUCTIONS
Mireya was advised of today's exam findings.  Optional to fill new glasses prescription, minimal change  Copy of glasses Rx provided today.  Return in 1-2 years for eye exam, or sooner if needed.    The affects of the dilating drops last for 4- 6 hours.  You will be more sensitive to light and vision will be blurry up close.  Mydriatic sunglasses were given if needed.      Salinas Carlin O.D.  [facility]  [place]  [address]    [phone]

## 2019-03-13 NOTE — PROGRESS NOTES
Chief Complaint   Patient presents with     Annual Eye Exam      Accompanied by self  Last Eye Exam: 1 year ago  Dilated Previously: Yes    What are you currently using to see?  Glasses- 1 year old and contacts, patient doesn't want to be fitted today-patient doesn't wear contacts that often.       Distance Vision Acuity: Noticed gradual change in both eyes    Near Vision Acuity: Satisfied with vision while reading  unaided    Eye Comfort: good  Do you use eye drops? : No  Occupation or Hobbies: student    Anne Marie Ruvalcaba, Optometric Tech          Medical, surgical and family histories reviewed and updated 3/13/2019.       OBJECTIVE: See Ophthalmology exam    ASSESSMENT:    ICD-10-CM    1. Encounter for examination of eyes and vision without abnormal findings Z01.00 EYE EXAM (SIMPLE-NONBILLABLE)   2. Myopia of both eyes H52.13 EYE EXAM (SIMPLE-NONBILLABLE)     REFRACTION   3. Regular astigmatism of right eye H52.221 EYE EXAM (SIMPLE-NONBILLABLE)     REFRACTION      PLAN:     Patient Instructions   Mireya was advised of today's exam findings.  Optional to fill new glasses prescription, minimal change  Copy of glasses Rx provided today.  Return in 1-2 years for eye exam, or sooner if needed.    The affects of the dilating drops last for 4- 6 hours.  You will be more sensitive to light and vision will be blurry up close.  Mydriatic sunglasses were given if needed.      Salinas Carlin O.D.  [address]  [address]    [phone]

## 2019-04-22 ENCOUNTER — TELEPHONE (OUTPATIENT)
Dept: PEDIATRICS | Facility: CLINIC | Age: 19
End: 2019-04-22

## 2019-04-22 NOTE — TELEPHONE ENCOUNTER
Spoke with Mireya. She had nexplanon placed in 2016 d/t severe cramps. She recently had it  replaced in December and since then has been experiencing cramps again.    She was on her period when she had it replaced, but is not sure when her LMP was (maybe had 1 since replacement). Mireya is wondering what she should do for the cramping? She is currently out of [place] for school and will be home in May for 2 weeks. I advised Mireya to make an appointment with Dr. Franklin at the time to discuss Nexplanon and cramps.     Will also route to covering providers to understand if there is anything else that should be done at this time. Is it less effective if she is having cramps/PMS symptoms? She said that this didn't happen before she had it replaced in December.     Mariah Maya RN, IBCLC

## 2019-04-22 NOTE — TELEPHONE ENCOUNTER
Reason for Call:  Other call back    Detailed comments: Mireya is wanting to speak with a RN about her Nexplanon. Please call patient to discuss/     Phone Number Patient can be reached at: Cell number on file:    Telephone Information:   Mobile [phone]       Best Time: Anytime    Can we leave a detailed message on this number? YES    Call taken on 4/22/2019 at 12:29 PM by Ida Whitaker

## 2019-04-23 NOTE — TELEPHONE ENCOUNTER
As it appears that Nex[facility] was [facility] by [facility] (Dr. Arias), I would recommend that Mireya call the [facility] to discuss what to do with Dr. Hickman, who placed the Nexplanon in December.

## 2019-04-23 NOTE — TELEPHONE ENCOUNTER
Called and advised Mireya to call the [facility]. She expressed understanding.  Piper Torre RN

## 2019-06-03 ENCOUNTER — OFFICE VISIT (OUTPATIENT)
Dept: FAMILY MEDICINE | Facility: CLINIC | Age: 19
End: 2019-06-03
Payer: COMMERCIAL

## 2019-06-03 VITALS
TEMPERATURE: 98.8 F | HEIGHT: 67 IN | HEART RATE: 76 BPM | WEIGHT: 163 LBS | BODY MASS INDEX: 25.58 KG/M2 | SYSTOLIC BLOOD PRESSURE: 138 MMHG | DIASTOLIC BLOOD PRESSURE: 70 MMHG

## 2019-06-03 DIAGNOSIS — Z00.00 ROUTINE HISTORY AND PHYSICAL EXAMINATION OF ADULT: Primary | ICD-10-CM

## 2019-06-03 DIAGNOSIS — G44.209 TENSION-TYPE HEADACHE, NOT INTRACTABLE, UNSPECIFIED CHRONICITY PATTERN: ICD-10-CM

## 2019-06-03 DIAGNOSIS — D50.9 IRON DEFICIENCY ANEMIA, UNSPECIFIED IRON DEFICIENCY ANEMIA TYPE: ICD-10-CM

## 2019-06-03 LAB
BASOPHILS # BLD AUTO: 0 10E9/L (ref 0–0.2)
BASOPHILS NFR BLD AUTO: 0.5 %
DIFFERENTIAL METHOD BLD: ABNORMAL
EOSINOPHIL # BLD AUTO: 0.2 10E9/L (ref 0–0.7)
EOSINOPHIL NFR BLD AUTO: 2.7 %
ERYTHROCYTE [DISTWIDTH] IN BLOOD BY AUTOMATED COUNT: 14.1 % (ref 10–15)
HCT VFR BLD AUTO: 35.4 % (ref 35–47)
HGB BLD-MCNC: 11.2 G/DL (ref 11.7–15.7)
LYMPHOCYTES # BLD AUTO: 2.2 10E9/L (ref 0.8–5.3)
LYMPHOCYTES NFR BLD AUTO: 28 %
MCH RBC QN AUTO: 25.3 PG (ref 26.5–33)
MCHC RBC AUTO-ENTMCNC: 31.6 G/DL (ref 31.5–36.5)
MCV RBC AUTO: 80 FL (ref 78–100)
MONOCYTES # BLD AUTO: 0.6 10E9/L (ref 0–1.3)
MONOCYTES NFR BLD AUTO: 8.2 %
NEUTROPHILS # BLD AUTO: 4.7 10E9/L (ref 1.6–8.3)
NEUTROPHILS NFR BLD AUTO: 60.6 %
PLATELET # BLD AUTO: 319 10E9/L (ref 150–450)
RBC # BLD AUTO: 4.43 10E12/L (ref 3.8–5.2)
WBC # BLD AUTO: 7.8 10E9/L (ref 4–11)

## 2019-06-03 PROCEDURE — 84443 ASSAY THYROID STIM HORMONE: CPT | Performed by: NURSE PRACTITIONER

## 2019-06-03 PROCEDURE — 99395 PREV VISIT EST AGE 18-39: CPT | Performed by: NURSE PRACTITIONER

## 2019-06-03 PROCEDURE — 82728 ASSAY OF FERRITIN: CPT | Performed by: NURSE PRACTITIONER

## 2019-06-03 PROCEDURE — 36415 COLL VENOUS BLD VENIPUNCTURE: CPT | Performed by: NURSE PRACTITIONER

## 2019-06-03 PROCEDURE — 85025 COMPLETE CBC W/AUTO DIFF WBC: CPT | Performed by: NURSE PRACTITIONER

## 2019-06-03 ASSESSMENT — ENCOUNTER SYMPTOMS
HEADACHES: 1
ARTHRALGIAS: 0
HEARTBURN: 0
FEVER: 0
PALPITATIONS: 0
DYSURIA: 0
EYE PAIN: 0
BREAST MASS: 0
NAUSEA: 0
DIARRHEA: 0
WEAKNESS: 0
CHILLS: 0
HEMATURIA: 0
NERVOUS/ANXIOUS: 0
FREQUENCY: 0
DIZZINESS: 0
SORE THROAT: 0
CONSTIPATION: 0
ABDOMINAL PAIN: 1
COUGH: 0
HEMATOCHEZIA: 0
SHORTNESS OF BREATH: 0
JOINT SWELLING: 0
MYALGIAS: 0
PARESTHESIAS: 0

## 2019-06-03 ASSESSMENT — MIFFLIN-ST. JEOR: SCORE: 1534.05

## 2019-06-03 NOTE — PATIENT INSTRUCTIONS
Preventive Health Recommendations  Female Ages 18 to 20     Yearly exam:     See your health care provider every year in order to  o Review health changes.   o Discuss preventive care.    o Review your medicines if your doctor has prescribed any.      You should be tested each year for STDs (sexually transmitted diseases).       After age 20, talk to your provider about how often you should have cholesterol testing.      If you are at risk for diabetes, you should have a diabetes test (fasting glucose).     Shots:     Get a flu shot each year.     Get a tetanus shot every 10 years.     Consider getting the shot (vaccine) that prevents cervical cancer (Gardasil).    Nutrition:     Eat at least 5 servings of fruits and vegetables each day.    Eat whole-grain bread, whole-wheat pasta and brown rice instead of white grains and rice.    Get adequate Calcium and Vitamin D.     Lifestyle    Exercise at least 150 minutes a week each week (30 minutes a day, 5 days a week). This will help you control your weight and prevent disease.    No smoking.     Wear sunscreen to prevent skin cancer.    See your dentist every six months for an exam and cleaning.  Patient Education     Mid-Cycle Pain (Mittelschmerz)  You have pain during the middle of your menstrual cycle or  mid-cycle pain . This is also called mittelschmerz, a Hungarian word meaning  middle pain.  Mid-cycle pain is caused by ovulation. Ovulation happens around the middle of the menstrual cycle when an egg is released from one of the ovaries. In some women, this causes sharp, cramping pain in one side of the lower abdomen. It can last from a few minutes to a day or so. You may feel the pain every month or only once in a while. The pain may also switch sides from month to month.  In most cases, mid-cycle pain is not a serious problem. Treatment is focused on symptom relief. If needed, medicine may be prescribed.    Home care  Medicines  You may use over-the-counter pain  medicine to control pain unless another pain medicine was prescribed. If the pain is severe and happens every month, you may be prescribed birth control pills to prevent ovulation.  General care  To help relieve pain, try these tips:  Rest as needed.  Apply a heating pad to where the pain is as directed. You may also use a hot water bottle.  Soak in a warm bath.  Follow-up care  Follow up with your healthcare provider, or as directed.    When to seek medical advice  Call your healthcare provider right away if any of these occur:  Fever of 100.4 F (38 C) or higher, or as directed by your provider  Pain gets worse or doesn t go away in 3 days  Pain occurs during urination or sex  Vaginal bleeding when it s not the normal time for your period  Unusual or foul-smelling vaginal discharge  Nausea or vomiting  Swelling of the abdomen  Pain that settles in the lower right part of the abdomen  Date Last Reviewed: 10/1/2017    3666-1376 [employer]. [address] [place] [address]. All rights reserved. This information is not intended as a substitute for professional medical care. Always follow your healthcare professional's instructions.           Patient Education     Rebound Headache    You use pain medicines called analgesics to treat your headaches. You are now having more frequent or intense headaches (rebound headaches). This is your body s response to too much pain medicine. Prescription pain medicines can cause these headaches. But so can over-the-counter medicines like acetaminophen or ibuprofen. A drug that contains caffeine or butalbital is most likely to cause rebound headache.  Symptoms of rebound headache include:  Mild to moderate headache for 15 or more days each month in a person with pre-existing headache disorder  Headache when you wake up that continues most of the day  Headaches getting worse over time  Need for more and more medicine to treat headaches  Your healthcare provider  can usually diagnose rebound headaches by your symptoms and medicine history. You may need tests to rule out other causes of your headaches. In the emergency room, you may be given a non-analgesic pain medicine to treat the pain or stop future headaches.  Home care  Treatment involves stopping use of your pain medicines. Your healthcare provider can tell you how to safely do this. You may be able to stop right away. Or you may need to take less and less over time (taper off). This will depend on the medicines you have been taking. To do this, follow the schedule that your provider gives you. If you are taking pain medicines for other types of pain at the same time, your healthcare provider may need other specialists to participate in your care.  For the first week or so after stopping, the headaches will likely get worse. You may also have withdrawal symptoms. These often include nausea, vomiting, and trouble sleeping. You may be given a medicine to help relieve pain and withdrawal symptoms. Take this exactly as you have been told. It is vital to avoid taking daily pain medicine. If you do so, rebound headaches will continue.  Caffeine can make rebound headaches worse. If you have caffeinated drinks every day, slowly cut your intake.  Keep a written log of your headaches. This can help you and your healthcare provider track your progress.  Be patient. It can take about 2 to 6 months to stop having rebound headaches.  Once you have broken the headache cycle, be careful not to start it again. Work with your provider to make a treatment plan for headache pain that has low risk of causing rebound headaches.  Relaxation can help lower tension and relieve pain. Try a massage, meditation, yoga, or other relaxation techniques. Or make time for a relaxing hobby that you enjoy.  Follow-up care  Follow up with your healthcare provider, or as advised.  When to seek medical advice  Call your healthcare provider right away if any  of these occur:  Fever of 100.4 F (38 C) or higher  Headaches that wake you from sleep  Repeated vomiting or visual problems that don't go away  Headache with a stiff neck, rash, confusion, weakness, numbness, seizure (convulsion), or trouble talking  Headache that starts after a head injury or fall  A type of headache you have never had before  Headache that gets worse despite rest and medicine  Date Last Reviewed: 4/1/2018 2000-2018 [employer]. [address] [place] [address]. All rights reserved. This information is not intended as a substitute for professional medical care. Always follow your healthcare professional's instructions.

## 2019-06-03 NOTE — PROGRESS NOTES
"   SUBJECTIVE:   CC: Mireya Yoon is an 18 year old woman who presents for preventive health visit.     Healthy Habits:     Getting at least 3 servings of Calcium per day:  Yes    Bi-annual eye exam:  Yes    Dental care twice a year:  Yes    Sleep apnea or symptoms of sleep apnea:  None    Diet:  Regular (no restrictions)    Frequency of exercise:  2-3 days/week    Duration of exercise:  30-45 minutes    Taking medications regularly:  Yes    Medication side effects:  Not applicable    PHQ-2 Total Score: 0    Additional concerns today:  Yes      PROBLEMS TO ADD ON...-      1) Would also like to have HGB checked? History of anemia   2) Follow-up from Feb and March on ovarian cyst? Was seen in the ER when she was away at school in [place] - was told she had ovarian cyst- when she saw OB/GYN in March 2019 they stated she had no cyst on her ultrasound.  Patient was good for a few weeks and then pain stated again about 2 weeks ago.  No pain today.  Low abdomen pain, quality: menstrual cramps that are \"deeper.\"  Ibuprofen does not really help.    3) Headache  Onset:  Years    Description:   Location:  All over   Character: throbbing pain, dull pain  Frequency:  Daily  Duration:  All day    Intensity: moderate, severe    Progression of Symptoms:  Improving from 2 weeks ago    Accompanying Signs & Symptoms:  Stiff neck: no  Neck or upper back pain: YES-  Sometimes- neck  Fever: no  Sinus pressure: YES  Nausea or vomiting: no   Dizziness: no  Numbness: no  Weakness: no  Visual changes: no    History:   Head trauma: no  Family history of migraines: YES-  Maybe mom?  Previous tests for headaches: YES-  Years ago- MRI  Neurologist evaluations: no  Able to do daily activities: no- not when severe  Wake with a headaches: YES  Do headaches wake you up: YES-  Bad ones do  Daily pain medication use: YES-  Advil  Work/school stressors/changes: YES-  School at times    Precipitating factors:   Does light make it worse: YES-  Bad " ones  Does sound make it worse: YES-  Bad ones    Alleviating factors:  Does sleep help: no     Therapies Tried and outcome: Ibuprofen (Advil, Motrin)      Today's PHQ-2 Score:   PHQ-2 ( 1999 Pfizer) 6/3/2019   Q1: Little interest or pleasure in doing things 0   Q2: Feeling down, depressed or hopeless 0   PHQ-2 Score 0   Q1: Little interest or pleasure in doing things Not at all   Q2: Feeling down, depressed or hopeless Not at all   PHQ-2 Score 0       Abuse: Current or Past(Physical, Sexual or Emotional)- No  Do you feel safe in your environment? Yes    Social History     Tobacco Use     Smoking status: Never Smoker     Smokeless tobacco: Never Used   Substance Use Topics     Alcohol use: No     Alcohol/week: 0.0 oz     If you drink alcohol do you typically have >3 drinks per day or >7 drinks per week? No    Alcohol Use 6/3/2019   Prescreen: >3 drinks/day or >7 drinks/week? No   Prescreen: >3 drinks/day or >7 drinks/week? -   No flowsheet data found.    Reviewed orders with patient.  Reviewed health maintenance and updated orders accordingly - Yes  BP Readings from Last 3 Encounters:   06/03/19 138/70   03/12/19 143/75   12/18/18 123/83    Wt Readings from Last 3 Encounters:   06/03/19 73.9 kg (163 lb)   03/12/19 73 kg (161 lb)   12/18/18 73.7 kg (162 lb 6.4 oz)                  Patient Active Problem List   Diagnosis     Iron deficiency anemia     Pain in joint, lower leg     Dysmenorrhea     Nexplanon in place     Chest pain, unspecified chest pain type     Anxiety     History reviewed. No pertinent surgical history.    Social History     Tobacco Use     Smoking status: Never Smoker     Smokeless tobacco: Never Used   Substance Use Topics     Alcohol use: No     Alcohol/week: 0.0 oz     Family History   Problem Relation Age of Onset     Hypertension Mother      Gallbladder Disease Mother      Unknown/Adopted Father      Diabetes Maternal Grandmother      Glaucoma No family hx of      Macular Degeneration No  "family hx of          Current Outpatient Medications   Medication Sig Dispense Refill     ibuprofen (ADVIL,MOTRIN) 600 MG tablet Take 1 tablet (600 mg) by mouth every 8 hours as needed for moderate pain or pain 40 tablet 0     sertraline (ZOLOFT) 50 MG tablet Take 1 tablet (50 mg) by mouth daily 60 tablet 1     spironolactone (ALDACTONE) 100 MG tablet Take 100 mg by mouth daily        No Known Allergies    Mammogram not appropriate for this patient based on age.    Pertinent mammograms are reviewed under the imaging tab.  History of abnormal Pap smear: NO - under age 21, PAP not appropriate for age     Reviewed and updated as needed this visit by clinical staff  Tobacco  Allergies  Meds         Reviewed and updated as needed this visit by Provider          Review of Systems   Constitutional: Negative for chills and fever.   HENT: Positive for congestion. Negative for ear pain, hearing loss and sore throat.    Eyes: Negative for pain and visual disturbance.   Respiratory: Negative for cough and shortness of breath.    Cardiovascular: Negative for chest pain, palpitations and peripheral edema.   Gastrointestinal: Positive for abdominal pain. Negative for constipation, diarrhea, heartburn, hematochezia and nausea.   Breasts:  Negative for tenderness, breast mass and discharge.   Genitourinary: Positive for pelvic pain. Negative for dysuria, frequency, genital sores, hematuria, urgency, vaginal bleeding and vaginal discharge.   Musculoskeletal: Negative for arthralgias, joint swelling and myalgias.   Skin: Negative for rash.   Neurological: Positive for headaches. Negative for dizziness, weakness and paresthesias.   Psychiatric/Behavioral: Negative for mood changes. The patient is not nervous/anxious.         OBJECTIVE:   /70 (BP Location: Right arm, Patient Position: Sitting, Cuff Size: Adult Regular)   Pulse 76   Temp 98.8  F (37.1  C) (Oral)   Ht 1.689 m (5' 6.5\")   Wt 73.9 kg (163 lb)   BMI 25.91 kg/m  "   Physical Exam  GENERAL: healthy, alert and no distress  EYES: Eyes grossly normal to inspection, PERRL and conjunctivae and sclerae normal  HENT: ear canals and TM's normal, nose and mouth without ulcers or lesions  NECK: no adenopathy, no asymmetry, masses, or scars and thyroid normal to palpation  RESP: lungs clear to auscultation - no rales, rhonchi or wheezes  BREAST: normal without masses, tenderness or nipple discharge and no palpable axillary masses or adenopathy  CV: regular rate and rhythm, normal S1 S2, no S3 or S4, no murmur, click or rub, no peripheral edema and peripheral pulses strong  ABDOMEN: soft, nontender, no hepatosplenomegaly, no masses and bowel sounds normal   (female): declined  MS: no gross musculoskeletal defects noted, no edema  SKIN: no suspicious lesions or rashes  NEURO: Normal strength and tone, mentation intact and speech normal  PSYCH: mentation appears normal, affect normal/bright    Diagnostic Test Results:  Labs reviewed in Epic  Results for orders placed or performed in visit on 06/03/19   TSH with free T4 reflex   Result Value Ref Range    TSH 2.48 0.40 - 4.00 mU/L   CBC with platelets and differential   Result Value Ref Range    WBC 7.8 4.0 - 11.0 10e9/L    RBC Count 4.43 3.8 - 5.2 10e12/L    Hemoglobin 11.2 (L) 11.7 - 15.7 g/dL    Hematocrit 35.4 35.0 - 47.0 %    MCV 80 78 - 100 fl    MCH 25.3 (L) 26.5 - 33.0 pg    MCHC 31.6 31.5 - 36.5 g/dL    RDW 14.1 10.0 - 15.0 %    Platelet Count 319 150 - 450 10e9/L    % Neutrophils 60.6 %    % Lymphocytes 28.0 %    % Monocytes 8.2 %    % Eosinophils 2.7 %    % Basophils 0.5 %    Absolute Neutrophil 4.7 1.6 - 8.3 10e9/L    Absolute Lymphocytes 2.2 0.8 - 5.3 10e9/L    Absolute Monocytes 0.6 0.0 - 1.3 10e9/L    Absolute Eosinophils 0.2 0.0 - 0.7 10e9/L    Absolute Basophils 0.0 0.0 - 0.2 10e9/L    Diff Method Automated Method        ASSESSMENT/PLAN:   1. Routine history and physical examination of adult    2. Tension-type headache, not  "intractable, unspecified chronicity pattern    - TSH with free T4 reflex  - CBC with platelets and differential  -Advised to limit ibuprofen use to 3 or less times per week to avoid rebound headaches.    3. Iron deficiency anemia, unspecified iron deficiency anemia type    - Ferritin in progress.    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    COUNSELING:  Reviewed preventive health counseling, as reflected in patient instructions       Regular exercise       Healthy diet/nutrition    Estimated body mass index is 25.91 kg/m  as calculated from the following:    Height as of this encounter: 1.689 m (5' 6.5\").    Weight as of this encounter: 73.9 kg (163 lb).    Weight management plan: Discussed healthy diet and exercise guidelines     reports that she has never smoked. She has never used smokeless tobacco.      Counseling Resources:  ATP IV Guidelines  Pooled Cohorts Equation Calculator  Breast Cancer Risk Calculator  FRAX Risk Assessment  ICSI Preventive Guidelines  Dietary Guidelines for Americans, 2010  USDA's MyPlate  ASA Prophylaxis  Lung CA Screening    Blanche Polanco NP  [facility]  "

## 2019-06-04 ENCOUNTER — TELEPHONE (OUTPATIENT)
Dept: FAMILY MEDICINE | Facility: CLINIC | Age: 19
End: 2019-06-04

## 2019-06-04 DIAGNOSIS — D50.9 IRON DEFICIENCY ANEMIA, UNSPECIFIED IRON DEFICIENCY ANEMIA TYPE: Primary | ICD-10-CM

## 2019-06-04 LAB
FERRITIN SERPL-MCNC: 16 NG/ML (ref 12–150)
TSH SERPL DL<=0.005 MIU/L-ACNC: 2.48 MU/L (ref 0.4–4)

## 2019-06-04 NOTE — TELEPHONE ENCOUNTER
Routing lab order request to provider for patient to recheck in 1 month. I pended a CBC with platelets and differential, would you like to order anything else?    Patient returns call to RN line. Relayed below message from provider with understanding voiced. Patient will call back to make the lab appointment.    Rosy Vincent RN

## 2019-06-04 NOTE — TELEPHONE ENCOUNTER
Please call patient with her lab results (she had requested a phone call, as she was leaving the state soon).    Thyroid screening is normal.  Hemoglobin slightly low and ferritin slightly below ideal range of > 30.    This suggests a mild iron deficiency.  Please review iron rich foods.  She can start an over the counter iron supplement with 65 mg elemental iron three times a week (Monday, Wednesday, Friday) and then have her CBC/ferritin rechecked in a month.    Blanche Polanco, DNP, APRN, CNP

## 2019-06-04 NOTE — LETTER
[address] [id number] [phone]      September 10, 2019      Mireya Yoon  [address]  [place] [address]          Dear Mireya Yoon:    This is to remind you that your provider wanted you to return to the clinic for lab testing.    If you are coming in for Lipids and/or Glucose testing please fast for 10-12 hours. Morning medications can be taken with water.    You may call our office at [phone] to schedule an appointment.    Please disregard this notice if you have already had your labs drawn or made an            appointment.        Sincerely,    Blanche Polanco NP

## 2019-06-04 NOTE — TELEPHONE ENCOUNTER
Patient/family was instructed to return call to [facility] RN directly on the RN Call back line at [phone].     Rosy Vincent RN

## 2019-12-30 ENCOUNTER — OFFICE VISIT (OUTPATIENT)
Dept: FAMILY MEDICINE | Facility: CLINIC | Age: 19
End: 2019-12-30
Payer: COMMERCIAL

## 2019-12-30 VITALS
TEMPERATURE: 98 F | WEIGHT: 167 LBS | BODY MASS INDEX: 26.21 KG/M2 | HEIGHT: 67 IN | SYSTOLIC BLOOD PRESSURE: 110 MMHG | DIASTOLIC BLOOD PRESSURE: 68 MMHG | HEART RATE: 70 BPM

## 2019-12-30 DIAGNOSIS — Z23 NEED FOR PROPHYLACTIC VACCINATION AND INOCULATION AGAINST INFLUENZA: ICD-10-CM

## 2019-12-30 DIAGNOSIS — Z11.3 SCREENING FOR STD (SEXUALLY TRANSMITTED DISEASE): ICD-10-CM

## 2019-12-30 DIAGNOSIS — Z12.4 SCREENING FOR MALIGNANT NEOPLASM OF CERVIX: ICD-10-CM

## 2019-12-30 DIAGNOSIS — Z01.419 ENCOUNTER FOR GYNECOLOGICAL EXAMINATION WITHOUT ABNORMAL FINDING: Primary | ICD-10-CM

## 2019-12-30 PROCEDURE — 90686 IIV4 VACC NO PRSV 0.5 ML IM: CPT | Performed by: FAMILY MEDICINE

## 2019-12-30 PROCEDURE — 87591 N.GONORRHOEAE DNA AMP PROB: CPT | Performed by: FAMILY MEDICINE

## 2019-12-30 PROCEDURE — 99213 OFFICE O/P EST LOW 20 MIN: CPT | Mod: 25 | Performed by: FAMILY MEDICINE

## 2019-12-30 PROCEDURE — 90472 IMMUNIZATION ADMIN EACH ADD: CPT | Performed by: FAMILY MEDICINE

## 2019-12-30 PROCEDURE — 90471 IMMUNIZATION ADMIN: CPT | Performed by: FAMILY MEDICINE

## 2019-12-30 PROCEDURE — 87491 CHLMYD TRACH DNA AMP PROBE: CPT | Performed by: FAMILY MEDICINE

## 2019-12-30 PROCEDURE — 90715 TDAP VACCINE 7 YRS/> IM: CPT | Performed by: FAMILY MEDICINE

## 2019-12-30 PROCEDURE — G0145 SCR C/V CYTO,THINLAYER,RESCR: HCPCS | Performed by: FAMILY MEDICINE

## 2019-12-30 ASSESSMENT — MIFFLIN-ST. JEOR: SCORE: 1547.2

## 2019-12-30 NOTE — LETTER
[address] [id number] [phone]                                                                                                January 3, 2020    Mireya Yoon  [address] [place] [address]        Dear Ms. Yoon,    All your results are normal. Please contact me if you have any questions.       Sincerely,      Ochoa Calderon, DO/hl    Results for orders placed or performed in visit on 12/30/19   Pap imaged thin layer screen only - recommended age 21 - 24 years     Status: None   Result Value Ref Range    PAP NIL     Copath Report         Patient Name: MIREYA YOON  MR#: [id number]  Specimen #: [id number]  Collected: 12/30/2019  Received: 12/31/2019  Reported: 1/2/2020 09:53  Ordering Phy(s): OCHOA CALDERON    For improved result formatting, select 'View Enhanced Report Format' under   Linked Documents section.    SPECIMEN/STAIN PROCESS:  Pap imaged thin layer prep screening (Surepath, FocalPoint with guided   screening)       Pap-Cyto x 1    SOURCE: Cervical, endocervical  ----------------------------------------------------------------   Pap imaged thin layer prep screening (Surepath, FocalPoint with guided   screening)  SPECIMEN ADEQUACY:  Satisfactory for evaluation.  -Transformation zone component absent.    CYTOLOGIC INTERPRETATION:    Negative for intraepithelial lesion or malignancy    Electronically signed out by:  HARPER Rivers (ASCP)    CLINICAL HISTORY:    Implant,    Papanicolaou Test Limitations:  Cervical cytology is a screening test with   limited sensitivity; regular  screening is critical  for cancer prevention; Pap tests are primarily   effective for the diagnosis/prevention of  squamous cell carcinoma, not adenocarcinomas or other cancers.    COLLECTION SITE:  Client:  [facility]  Location: [facility]    The technical component of this testing was completed at the [facility]    [facility], with the professional component performed   at the [facility], [address] [place] [address],   [place] [address] ([phone])       Chlamydia trachomatis PCR     Status: None   Result Value Ref Range    Specimen Description Cervix     Chlamydia Trachomatis PCR Negative NEG^Negative   Neisseria gonorrhoeae PCR     Status: None   Result Value Ref Range    Specimen Descrip Cervix     N Gonorrhea PCR Negative NEG^Negative

## 2019-12-30 NOTE — PATIENT INSTRUCTIONS
[facility]   Discharged by : Lynn Gardner MA    If you have any questions regarding your visit please contact your care team:     Team Gold                Clinic Hours Telephone Number     Dr. Kasia Black, CNP 7am-7pm  Monday - Thursday   7am-5pm  Fridays  [phone]   (Appointment scheduling available 24/7)     RN Line  [phone] option 2     [facility] [place] [facility] 11am-9pm Monday-Friday Saturday-Sunday- 9am-5pm     [place] -   5pm-9pm Monday-Friday Saturday-Sunday- 9am-5pm    [phone] - Janet Galicia    [phone] - [place]     For a Price Quote for your services, please call our Consumer Price Line at [phone].     What options do I have for visits at the clinic other than the traditional office visit?     To expand how we care for you, many of our providers are utilizing electronic visits (e-visits) and telephone visits, when medically appropriate, for interactions with their patients rather than a visit in the clinic. We also offer nurse visits for many medical concerns. Just like any other service, we will bill your insurance company for this type of visit based on time spent on the phone with your provider. Not all insurance companies cover these visits. Please check with your medical insurance if this type of visit is covered. You will be responsible for any charges that are not paid by your insurance.   E-visits via [employer]: generally incur a $45.00 fee.     Telephone visits:  Time spent on the phone: *charged based on time that is spent on the phone in increments of 10 minutes. Estimated cost:   5-10 mins $30.00   11-20 mins. $59.00   21-30 mins. $85.00     Use Blockboardt (secure email communication and access to your chart) to send your primary care provider a message or make an appointment. Ask someone on your Team how to sign up for Nuevora.     As always, Thank you for trusting us with your  health care needs!    [facility]:    Scheduling Appointments  [facility]  Call: [phone]    [facility]  Call: [phone]    [facility]  Call: [phone]    For Gastroenterology referrals   [facility]   [facility]inics and Surgery Center, 4th Floor   [address] [place] [address]   Appointments: [phone]    WHERE TO GO FOR CARE?  Clinic    Make an appointment if you:       Are sick (cold, cough, flu, sore throat, earache or in pain).       Have a small injury (sprain, small cut, burn or broken bone).       Need a physical exam, Pap smear, vaccine or prescription refill.       Have questions about your health or medicines.    To reach us:      Call [phone] ([phone]). Open 24 hours every day. (For counseling services, call [phone].)    Log into [employer] at [employer]ia. (Visit [email or web address] to create an account.) Hospital emergency room    An emergency is a serious or life- threatening problem that must be treated right away.    Call [phone] or get to the hospital if you have:      Very bad or sudden:            - Chest pain or pressure         - Bleeding         - Head or belly pain         - Dizziness or trouble seeing, walking or                          Speaking      Problems breathing      Blood in your vomit or you are coughing up blood      A major injury (knocked out, loss of a finger or limb, rape, broken bone protruding from skin)    A mental health crisis. (Or call the Mental Health Crisis line at 1-456.445.1600 or Suicide Prevention Hotline at 1-375.845.8391.)    Open 24 hours every day. You don't need an appointment.     Urgent care    Visit urgent care for sickness or small injuries when the clinic is closed. You don't need an appointment. To check hours or find an urgent care near you, visit [email or web address]. Online care    Get online care from  [facility] for more than 70 common problems, like colds, allergies and infections. Open 24 hours every day at:   [email or web address]   Need help deciding?    For advice about where to be seen, you may call your clinic and ask to speak with a nurse. We're here for you 24 hours every day.         If you are deaf or hard of hearing, please let us know. We provide many free services including sign language interpreters, oral interpreters, TTYs, telephone amplifiers, note takers and written materials.

## 2019-12-30 NOTE — LETTER
January 3, 2020      Mireya Yoon  [address]  [place] [address]    Dear Ms.Yoon,      I am happy to inform you that your recent cervical cancer screening test (PAP smear) was normal.      Preventative screenings such as this help to ensure your health for years to come. You should repeat a pap smear in 3 years, unless otherwise directed.      You will still need to return to the clinic every year for your annual exam and other preventive tests.     If you have additional questions regarding this result, please call our registered nurse, Brianda at [phone].      Sincerely,      Cheli Ruano DO/damian

## 2019-12-30 NOTE — PROGRESS NOTES
"Subjective     Mireya Yoon is a 19 year old female who presents to clinic today for the following health issues:    HPI   Concern -  Pap  She decided to come in now for her pap smear since she just turned 19   She is currently sexually active, has had 2 of her HPV shots   Has been sexually active with one partner for 4 years  Has had routine HIV, gonorrhea and chlamydia check within a year  Using Nexplanon for contraceptive  Denies any usually vaginal discharge   Goes to [employer] in [place]         Current Outpatient Medications   Medication Sig Dispense Refill     ibuprofen (ADVIL,MOTRIN) 600 MG tablet Take 1 tablet (600 mg) by mouth every 8 hours as needed for moderate pain or pain 40 tablet 0     sertraline (ZOLOFT) 50 MG tablet Take 1 tablet (50 mg) by mouth daily 60 tablet 1     spironolactone (ALDACTONE) 100 MG tablet Take 100 mg by mouth daily        VITAMIN D PO        BP Readings from Last 3 Encounters:   12/30/19 110/68   06/03/19 138/70   03/12/19 143/75    Wt Readings from Last 3 Encounters:   12/30/19 75.8 kg (167 lb)   06/03/19 73.9 kg (163 lb)   03/12/19 73 kg (161 lb)                    Reviewed and updated as needed this visit by Provider         Review of Systems   ROS COMP: Constitutional, HEENT, cardiovascular, pulmonary, GI, , musculoskeletal, neuro, skin, endocrine and psych systems are negative, except as otherwise noted.      This document serves as a record of the services and decisions personally performed and made by Cheli Ruano DO. It was created on her behalf by Lay Krishnan, a trained medical scribe. The creation of this document is based on the provider's statements to the medical scribe.  Lay Krishnan 1:31 PM December 30, 2019    Objective    /68   Pulse 70   Temp 98  F (36.7  C) (Oral)   Ht 1.689 m (5' 6.5\")   Wt 75.8 kg (167 lb)   BMI 26.55 kg/m    Body mass index is 26.55 kg/m .  Physical Exam   GENERAL: healthy, alert and no distress  EYES: Eyes " grossly normal to inspection, PERRL and conjunctivae and sclerae normal  HENT: ear canals and TM's normal, nose and mouth without ulcers or lesions  NECK: no adenopathy, no asymmetry, masses, or scars and thyroid normal to palpation  RESP: lungs clear to auscultation - no rales, rhonchi or wheezes  BREAST: normal without masses, tenderness or nipple discharge and no palpable axillary masses or adenopathy  CV: regular rate and rhythm, normal S1 S2, no S3 or S4, no murmur, click or rub, no peripheral edema and peripheral pulses strong  ABDOMEN: soft, nontender, no hepatosplenomegaly, no masses and bowel sounds normal   (female): normal female external genitalia, normal urethral meatus, vaginal mucosa pink, moist, well rugated, and normal cervix/adnexa/uterus without masses or discharge  MS: no gross musculoskeletal defects noted, no edema  SKIN: no suspicious lesions or rashes  NEURO: Normal strength and tone, mentation intact and speech normal  PSYCH: mentation appears normal, affect normal/bright    Diagnostic Test Results:  Labs reviewed in Epic  No results found for this or any previous visit (from the past 24 hour(s)).        Assessment & Plan      ICD-10-CM    1. Encounter for gynecological examination without abnormal finding Z01.419    2. Screening for malignant neoplasm of cervix Z12.4 Pap imaged thin layer screen only - recommended age 21 - 24 years   3. Need for prophylactic vaccination and inoculation against influenza Z23 INFLUENZA VACCINE IM > 6 MONTHS VALENT IIV4 [21524]     Vaccine Administration, Initial [30454]   4. Screening for STD (sexually transmitted disease) Z11.3 Chlamydia trachomatis PCR     Neisseria gonorrhoeae PCR       1. Screening for malignant neoplasm of cervix  Routine screening, patient request STD screening with pap smear since she has been sexually active. Up to date on tetanus and flu vaccine today.  - Pap imaged thin layer screen only - recommended age 21 - 24 years    2. Need  "for prophylactic vaccination and inoculation against influenza  Up to date    - INFLUENZA VACCINE IM > 6 MONTHS VALENT IIV4 [14745]  - Vaccine Administration, Initial [06818]     BMI:   Estimated body mass index is 26.55 kg/m  as calculated from the following:    Height as of this encounter: 1.689 m (5' 6.5\").    Weight as of this encounter: 75.8 kg (167 lb).   Weight management plan: Discussed healthy diet and exercise guidelines        See Patient Instructions    No follow-ups on file.    The information in this document, created by the medical scribe for me, accurately reflects the services I personally performed and the decisions made by me. I have reviewed and approved this document for accuracy prior to leaving the patient care area.  December 30, 2019 1:32 PM    Cheli Ruano DO  [facility]    "

## 2019-12-31 LAB
C TRACH DNA SPEC QL NAA+PROBE: NEGATIVE
N GONORRHOEA DNA SPEC QL NAA+PROBE: NEGATIVE
SPECIMEN SOURCE: NORMAL
SPECIMEN SOURCE: NORMAL

## 2020-01-02 LAB
COPATH REPORT: NORMAL
PAP: NORMAL

## 2020-01-02 NOTE — RESULT ENCOUNTER NOTE
All your results are essentially clara. Please contact me if you have any questions.  Take care,  Cheli Ruano D.O.

## 2020-05-21 ENCOUNTER — OFFICE VISIT (OUTPATIENT)
Dept: FAMILY MEDICINE | Facility: CLINIC | Age: 20
End: 2020-05-21
Payer: OTHER MISCELLANEOUS

## 2020-05-21 VITALS
DIASTOLIC BLOOD PRESSURE: 74 MMHG | HEART RATE: 80 BPM | BODY MASS INDEX: 26.68 KG/M2 | SYSTOLIC BLOOD PRESSURE: 111 MMHG | HEIGHT: 67 IN | WEIGHT: 170 LBS

## 2020-05-21 DIAGNOSIS — G56.02 CARPAL TUNNEL SYNDROME OF LEFT WRIST: ICD-10-CM

## 2020-05-21 DIAGNOSIS — M54.50 ACUTE LEFT-SIDED LOW BACK PAIN WITHOUT SCIATICA: Primary | ICD-10-CM

## 2020-05-21 PROCEDURE — 99214 OFFICE O/P EST MOD 30 MIN: CPT | Performed by: FAMILY MEDICINE

## 2020-05-21 ASSESSMENT — MIFFLIN-ST. JEOR: SCORE: 1560.8

## 2020-05-21 NOTE — PATIENT INSTRUCTIONS
Please wear the wrist brace at night - to help your wrist muscles heal.  You can also wear it during the day if you are still having the numbness or tingling and need to use your wrists/lower arms a lot.    [facility] will call you to schedule physical therapy evaluation.  I would advise working with physical therapy if your pain is not improving over the next 2-3 days.    If you do not hear from them within 2-3 days you can reach them at [phone].  Your records from physical therapy visits will be available to me electronically.  Please let me know if your symptoms do not improve as expected with physical therapy.

## 2020-05-21 NOTE — PROGRESS NOTES
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     Description:   Location of pain: low back left and on 05/20 her left finger tips started to feel tingling.   Character of pain: between sharp/dull ache. Yesterday left arm started to aches   Pain radiation: with certain movement it will shoot up/down her left side only   New numbness or weakness in legs, not attributed to pain: no    Intensity: Currently 6/10    History:   Pain interferes with job: yes  History of back problems: no prior back problems  Any previous MRI or X-rays: None  Sees a specialist for back pain:  No  Therapies tried without relief: ibuprofen and ice    Alleviating factors:   Improved by:    Precipitating factors:  Worsened by: Bending, Standing and Lying Flat    5 gallon buckets - lifting a lot of them on Tuesday.    Started right after she had lifted 5 in a row.  Is right handed, but sometimes lifts the buckets with right arm or left arm.            Accompanying Signs & Symptoms:  Risk of Fracture:  None  Risk of Cauda Equina:  None  Risk of Infection:  None  Risk of Cancer:  None  Risk of Ankylosing Spondylitis:  Onset at age <35, male, AND morning back stiffness. no         Reviewed and updated as needed this visit by Provider  Allergies  Meds  Problems         Review of Systems   Constitutional, HEENT, cardiovascular, pulmonary, gi and gu systems are negative, except as otherwise noted.      Objective    /74   Pulse 80   Ht 1.689 m (5' 6.5\")   Wt 77.1 kg (170 lb)   BMI 27.03 kg/m    Body mass index is 27.03 kg/m .  Physical Exam   GENERAL APPEARANCE: healthy, alert and no distress  SKIN: no suspicious lesions or rashes  Comprehensive back pain exam:  no spinous tenderness.  " tendnerss of left upper lumbar paraspinous muscles., Range of motion not limited by pain, Lower extremity strength functional and equal on both sides, Lower extremity reflexes within normal limits bilaterally, Lower extremity sensation normal and equal on both sides and Straight leg raise negative bilaterally   Left lower arm: full range of motion.  Non tender at elbow.  Subjective numbness of thumb and 4-5th digits.  Positive tinel's and phalens.  PSYCH: mentation appears normal and affect normal/bright    Diagnostic Test Results:  none         Assessment & Plan     1. Acute left-sided low back pain without sciatica  Due to muscular strain.  Advised limiting activities and relative rest.  Note written for work.Discussed warning signs/symptoms for which she needs followup.    - [facility] REFERRAL; Future    2. Carpal tunnel syndrome of left wrist  Also related to lifting   Splint provided.  - Wrist/Arm Supplies Order for DME - ONLY FOR DME       See Patient Instructions    Return in about 2 months (around 7/21/2020) for Preventive Health Visit/Physical.    Karlene Delgado MD  [facility]

## 2020-05-21 NOTE — LETTER
May 21, 2020      Mireya Yoon  [address] [address]        To Whom It May Concern:    Mireya Yoon was seen in our clinic. She may return to work with the following: no lifting or bending or twisting for 3 days on or about 05/21/20.  Advised to wear wrist brace at night for next 2-3 days, and during the day time if she is otherwise doing lifting but still having left hand tingling.  May return to normal activities on 5/25/20 if her symptoms are resolved in her back.      Sincerely,          Karlene Delgado MD

## 2020-05-26 ENCOUNTER — VIRTUAL VISIT (OUTPATIENT)
Dept: PHYSICAL THERAPY | Facility: CLINIC | Age: 20
End: 2020-05-26
Attending: FAMILY MEDICINE
Payer: OTHER MISCELLANEOUS

## 2020-05-26 DIAGNOSIS — M54.50 ACUTE LEFT-SIDED LOW BACK PAIN WITHOUT SCIATICA: ICD-10-CM

## 2020-05-26 DIAGNOSIS — M54.50 ACUTE MIDLINE LOW BACK PAIN WITHOUT SCIATICA: ICD-10-CM

## 2020-05-26 PROCEDURE — 97112 NEUROMUSCULAR REEDUCATION: CPT | Mod: 95 | Performed by: PHYSICAL THERAPIST

## 2020-05-26 PROCEDURE — 97161 PT EVAL LOW COMPLEX 20 MIN: CPT | Mod: 95 | Performed by: PHYSICAL THERAPIST

## 2020-05-26 PROCEDURE — 97110 THERAPEUTIC EXERCISES: CPT | Mod: 95 | Performed by: PHYSICAL THERAPIST

## 2020-05-26 NOTE — PROGRESS NOTES
"Physical Therapy Virtual Initial Visit      The patient has been notified of following:     \"This virtual visit will be conducted between you and your provider. We have found that certain health care needs can be provided without the need for physical presence.  This service lets us provide the care you need with a virtual visit.\"    Due to external, as well as internal [facility] management of the COVID-19 Virus, Mireya Yoon was not seen in our clinic.  As a substitution, we implemented a virtual visit to manage this patient's condition utilizing the [employer] virtual visit platform via the patient s existing code.  The provider, Yamile Allen, reviewed the patient's chart, PTRx prescription, and spoke with the patient to determine the following telemedicine visit is appropriate and effective for the patient's care.    The following type of visit was completed:   Video Visit:  The [employer] platform uses a synchronous HIPAA compliant video stream for this patient encounter.         Subjective:  The history is provided by the patient. No  was used.   Patient Health History  Mireya Yoon being seen for LBP.     Problem began: 5/19/2020 (DOI).   Problem occurred: Lifting at work   Pain is reported as 4/10 on pain scale.  General health as reported by patient is excellent.  Pertinent medical history includes: none.   Red flags:  None as reported by patient.  Medical allergies: none.   Surgeries include:  None.    Current medications:  Anti-inflammatory.    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   Primary job tasks include:  Lifting/carrying, prolonged standing, repetitive tasks and pushing/pulling.                  Therapist Generated HPI Evaluation  Problem details: Patient presents to PT today via video with c/o LBP(L>R side).  DOI: 5/19/20: pain after repeat lifting of 5 gallon pain containers; unable to complete work shift;  Initially patient stated " she had back pain and L leg pain; but leg pain has resolved.  No previous back injury.    Patient RTW today; limited lifting and shift length reported by patient.  .         Type of problem:  Lumbar.    This is a new condition.  Condition occurred with:  Lifting.  Where condition occurred: at work.  Patient reports pain:  Lower lumbar spine, lumbar spine left and central lumbar spine.  Pain is described as aching and sharp and is constant (varies in intensity).  Pain radiates to:  No radiation. Pain is the same all the time.  Since onset symptoms are gradually improving.  Associated symptoms:  Loss of motion/stiffness. Symptoms are exacerbated by bending, lifting, twisting and standing  and relieved by heat, ice and rest.  Imaging testing: none.    Restrictions due to condition include:  Working in normal job with restrictions.  Barriers include:  None as reported by patient.                  Objective:    Standing Alignment:    Cervical/Thoracic:  Forward head  Shoulder/UE:  Rounded shoulders  Lumbar:  Anterior pelvic tilt and lordosis incr                         Lumbar/SI Evaluation  ROM:    AROM Lumbar:   Flexion:          Touches toes  Ext:                    Min loss; pain   Side Bend:        Left:  50% loss; pain    Right:  WNL; no pain  Rotation:           Left:     Right:   Side Glide:        Left:     Right:           Lumbar Myotomes:  Lumbar myotomes: pt feels both legs are strong.                                                                       General   ROS    PTRx Content from today's visit:  Exercise Name: Posture Correction with Lumbar Roll  Exercise Name: Neutral Spine Standing, Notes: pull in tummy muscle when standing to help support LB  Exercise Name: Standing Extension, Sets: 1 - Reps: 5-10 reps - Sessions: 3-5x/day, Notes: use a counter for support  Exercise Name: Double Knee to Chest, Sets: 1 - Reps: 5-10 reps; 15-30 sec hold - Sessions: 1-2x/day  Exercise Name: Supine Lumbar Hip Roll,  Sets: 1 - Reps: 5-10 reps; 5 sec hold each side - Sessions: 1-2x/day  Exercise Name: Knee Bends, Sets: 1 - Reps: 10-20  - Sessions: 1x/day, Notes: hold onto kitchen counter  Exercise Name: Bridging #1, Sets: 1 - Reps: 10-30 ;  add hold time of 5-10 sec - Sessions: 1x  Exercise Name: Abdominal Brace Transverse Abdominis, Sets: 1-2 - Reps: 10 reps;  in sitting and standing positions  Exercise Name: Supine Abdominal Exercise #3 (Marching), Sets: 2 - Reps: 10 marches - Sessions: 1x/day  Exercise Name: Body Mechanics - Golfers Lift  Exercise Name: Body Mechanics - Full Squat  Assessment/Plan:     Patient is a 19 year old female with lumbar complaints.    Patient has the following significant findings with corresponding treatment plan.                Diagnosis 1: Lumbar Strain  Pain -  hot/cold therapy and manual therapy  Decreased ROM/flexibility - manual therapy, therapeutic exercise and therapeutic activity  Impaired muscle performance - neuro re-education  Decreased function - therapeutic activities  Impaired posture - neuro re-education    Therapy Evaluation Codes:   1) History comprised of:   Personal factors that impact the plan of care:      None.    Comorbidity factors that impact the plan of care are:      None.     Medications impacting care: Anti-inflammatory.  2) Examination of Body Systems comprised of:   Body structures and functions that impact the plan of care:      Lumbar spine.   Activity limitations that impact the plan of care are:      Bending, Lifting, Standing and Working.  3) Clinical presentation characteristics are:   Stable/Uncomplicated.  4) Decision-Making    Low complexity using standardized patient assessment instrument and/or measureable assessment of functional outcome.  Cumulative Therapy Evaluation is: Low complexity.    Previous and current functional limitations:  (See Goal Flow Sheet for this information)    Short term and Long term goals: (See Goal Flow Sheet for this information)      Communication ability:  Patient appears to be able to clearly communicate and understand verbal and written communication and follow directions correctly.  Treatment Explanation - The following has been discussed with the patient:   RX ordered/plan of care  Anticipated outcomes  Possible risks and side effects  This patient would benefit from PT intervention to resume normal activities.   Rehab potential is good.    Frequency:  1 X week, once daily  Duration:  for 6 visits; on and every other week basis  Discharge Plan:  Achieve all LTG.  Independent in home treatment program.  Reach maximal therapeutic benefit.    Please refer to the daily flowsheet for treatment today, total treatment time and time spent performing 1:1 timed codes.       Virtual visit contact time    Time of service began: 3:55 PM  Time of service ended: 4:35 PM  Total Time for set up, visit, and documentation: 40 minutes    Payor: WORK COMP / Plan: YUE PHAN / Product Type: *No Product type* /     Procedure Code/s   Therapeutic Exercise (71200): 14 minutes  Neuromuscular Re-education (52274): 8 minutes  Therapeutic Activities (64593): 4 minutes  Evaluation: 15 minutes    I have reviewed the note as documented above.  This accurately captures the substance of my conversation with the patient.  Provider location: [facility] ([facility]State)  Patient location: Home    ___________________________________________________

## 2020-06-14 ENCOUNTER — VIRTUAL VISIT (OUTPATIENT)
Dept: FAMILY MEDICINE | Facility: OTHER | Age: 20
End: 2020-06-14
Payer: COMMERCIAL

## 2020-06-14 PROCEDURE — 99421 OL DIG E/M SVC 5-10 MIN: CPT | Performed by: PHYSICIAN ASSISTANT

## 2020-06-14 NOTE — PROGRESS NOTES
"Date: 2020 05:58:06  Clinician: Georgina Pino  Clinician NPI: [id number]  Patient: Mireya Yoon  Patient : 2000  Patient Address[place] [address] [place], [place] [address] [address]  Patient Phone: [phone]  Visit Protocol: Eye conditions  Patient Summary:  Mireya is a 19 year old (: 2000 ) female who initiated a Visit for conjunctivitis.  When asked the question \"Please sign me up to receive news, health information and promotions from [employer].\", Mireya responded \"No\".   A synchronous visit is necessary because the patient reported the following abnormal symptoms:   Sensitivity to light (requires clarification)   Images of her eye condition were uploaded.   Her symptoms started 1-2 days ago and affect the left eye. The symptoms consist of light sensitivity, itchy eye(s), eye pain, drainage coming from the eye(s), and eye redness.   Symptom details     Drainage: The color of the drainage coming out of her eye(s) is yellow. The drainage is thick and causes her eyelids to be stuck shut in the morning.    Itchiness: Mireya has seasonal allergies or hay fever.    Eye pain: She is experiencing moderate eye pain (4-6 on a 10 point pain scale). She has not taken over-the-counter medication for the pain.     Denied symptoms include eyelid swelling and bumps on the eyelid. Mireya does not have subconjunctival hemorrhage and has not experienced a decrease in vision. She does not feel feverish.   Precipitating events  Mireya has recently been exposed to someone with a red eye or an eye infection. Additionally, Mierya wears contact lenses. She has not slept in them in the past week.   Mireya has not had a recent diagnosis of conjunctivitis. She also has not had a recent cold or ear infection, eye surgery, foreign body in the eye(s), and eye injury.   Pertinent medical history  Mireya has not ever been diagnosed with glaucoma.   Mireya is not taking medication to treat her current symptoms.   Mireya does not require proof of evaluation of " "her eye condition before returning to school, work, or .   Mireya does not smoke or use smokeless tobacco.   She denies pregnancy and denies breastfeeding. She has menstruated in the past month.     MEDICATIONS: spironolactone oral, sertraline oral, ALLERGIES: NKDA  Clinician Response:  Dear Mireya,  Based on the information provided, you most likely have bacterial conjunctivitis, more commonly called pink eye.  Medication information  I am prescribing:  Polymyxin B sulf-trimethoprim (Polytrim) 10,000 unit- 1 mg/mL ophthalmic (eye) drops. Apply 1 drop into the affected eye(s) every 3 hours, up to 6 times a day for 7 days. There are no refills with this prescription.  The medication I prescribed is an antibiotic medication. Infections can be caused by either bacteria or a virus, and often have similar symptoms, so it is possible that this is a viral infection. Antibiotics are only effective against bacterial infections, so when it is caused by a virus, the medication will not help symptoms improve or make it less contagious.  Self care  To reduce the spread of the eye infection, you should not wear contacts or use eye makeup until the infection has fully resolved, and be sure to wash your hands at least once per hour and avoid touching the eyes as much as possible.  The following will reduce the risk for future eye infections:     Frequent handwashing    Replace towels and washcloths daily    Do not share towels and washcloths with others    Replace contacts and cases used while eyes were infected    Do not sleep in contacts that are not FDA-approved for overnight wear    Do not reuse or \"top off\" contact solution    Replace eye makeup used while eyes were infected    Do not use anyone else's eye makeup     Steps you can take to be as comfortable as possible:     Avoid rubbing your eyes    Apply a cool compress to the eye(s)    Take regular breaks and remember to blink regularly when reading or using a computer " for long periods of time    Wear sunglasses when outside    Wear eye protection when swimming or working with chemicals    Use good lighting     When to seek care  Please make an appointment to be seen in a clinic or urgent care if any of the following occurs:     You develop new symptoms or your symptoms becomes worse.    Your symptoms do not improve within 2 days of starting treatment.      Diagnosis: Bacterial conjunctivitis  Diagnosis ICD: H10.9  Triage Notes: I reviewed the patient's history, verified their identity, and explained the Visit process.    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  Synchronous Triage: phone, status: completed, duration: 123 seconds  Prescription: polymyxin B sulf-trimethoprim (Polytrim) 10,000 unit- 1 mg/mL ophthalmic (eye) drops 1 10 ml dropper bottle, 7 days supply. Apply 1 drop into the affected eye(s) every 3 hours up to 6 times a day for 7 days. Refills: 0, Refill as needed: no, Allow substitutions: yes  Pharmacy: [facility] #[id number] - [phone] - [address] [place] [address]  Addendum created: Lizeth 15 18:57:30, 2020 created by: Mar Pena body: Erythromycin 5 mg/gram ophthalmic ointment 0.5 inch (1.25 cm) to affected eye 4 times daily for 5

## 2020-07-17 ENCOUNTER — NURSE TRIAGE (OUTPATIENT)
Dept: FAMILY MEDICINE | Facility: CLINIC | Age: 20
End: 2020-07-17

## 2020-07-17 ENCOUNTER — OFFICE VISIT (OUTPATIENT)
Dept: URGENT CARE | Facility: URGENT CARE | Age: 20
End: 2020-07-17
Payer: COMMERCIAL

## 2020-07-17 VITALS
WEIGHT: 164.6 LBS | RESPIRATION RATE: 18 BRPM | DIASTOLIC BLOOD PRESSURE: 84 MMHG | SYSTOLIC BLOOD PRESSURE: 142 MMHG | TEMPERATURE: 99.7 F | BODY MASS INDEX: 26.17 KG/M2 | OXYGEN SATURATION: 99 % | HEART RATE: 96 BPM

## 2020-07-17 DIAGNOSIS — R42 DIZZINESS: Primary | ICD-10-CM

## 2020-07-17 LAB — HGB BLD-MCNC: 12.5 G/DL (ref 11.7–15.7)

## 2020-07-17 PROCEDURE — 36415 COLL VENOUS BLD VENIPUNCTURE: CPT | Performed by: FAMILY MEDICINE

## 2020-07-17 PROCEDURE — 85018 HEMOGLOBIN: CPT | Performed by: FAMILY MEDICINE

## 2020-07-17 PROCEDURE — 99214 OFFICE O/P EST MOD 30 MIN: CPT | Performed by: FAMILY MEDICINE

## 2020-07-17 ASSESSMENT — ENCOUNTER SYMPTOMS
DIARRHEA: 0
NUMBNESS: 0
RHINORRHEA: 0
NAUSEA: 0
CHILLS: 0
SHORTNESS OF BREATH: 0
VOMITING: 0
FEVER: 0
SORE THROAT: 0
DIZZINESS: 1
LIGHT-HEADEDNESS: 0
COUGH: 0
HEADACHES: 1
ABDOMINAL PAIN: 0
WEAKNESS: 1

## 2020-07-17 NOTE — TELEPHONE ENCOUNTER
Reason for call:  Patient reporting a symptom    Symptom or request: fatigue, lightheadedness, confusion concerns    Duration (how long have symptoms been present):   Since this last week    Have you been treated for this before? No    Additional comments: Patient is wondering on what this could be. Patient said it started at work about a week ago and it hasn't been better since and doesn't feel herself. She states that she feels out of it and not as coherent. Patient took BP readings and it was about 124/82 which was normal. Patient took a pregnancy test and it was negative and the oxygen levels were normal. Patient also checked her temp and it was 98.3. She doesn't know what else could be the cause and she doesn't feel the same.    Phone Number patient can be reached at:  Cell number on file:    Telephone Information:   Mobile [phone]       Best Time:  As soon as possible     Can we leave a detailed message on this number:  YES    Call taken on 7/17/2020 at 3:19 PM by Wale Layton

## 2020-07-17 NOTE — TELEPHONE ENCOUNTER
Recommended that she go to urgent Care so she can be assessed.  She does have a history of anemia.  She has not felt right since last Saturday.     Patient states she  took her /80 Pulse 84.    She just got off of her period, it was heavy but that is normal.     Since that day at work she states her headaches are a little more frequent. She hasn't noticed any decrease in urine output, no chest/heart symptoms.  Denies breathing problems. But continues to feel off and out of sorts.  Additional Information    Negative: Shock suspected (e.g., cold/pale/clammy skin, too weak to stand, low BP, rapid pulse)    Negative: Difficult to awaken or acting confused (e.g., disoriented, slurred speech)    Negative: Fainted, and still feels dizzy afterwards    Negative: Severe difficulty breathing (e.g., struggling for each breath, speaks in single words)    Negative: Overdose (accidental or intentional) of medications    Negative: New neurologic deficit that is present now: * Weakness of the face, arm, or leg on one side of the body * Numbness of the face, arm, or leg on one side of the body * Loss of speech or garbled speech    Negative: Heart beating < 50 beats per minute OR > 140 beats per minute    Negative: Sounds like a life-threatening emergency to the triager    Negative: Chest pain    Negative: Rectal bleeding, bloody stool, or tarry-black stool    Negative: Vomiting is the main symptom    Negative: Diarrhea is the main symptom    Negative: Headache is the main symptom    Negative: Heat exhaustion suspected (i.e., dehydration from heat exposure)    Negative: Patient states that he/she is having an anxiety/panic attack    Negative: SEVERE dizziness (e.g., unable to stand, requires support to walk, feels like passing out now)    Negative: SEVERE headache or neck pain    Negative: Spinning or tilting sensation (vertigo) present now and one or more stroke risk factors (i.e., hypertension, diabetes, prior stroke/TIA,  "heart attack, age over 60) (Exception: prior physician evaluation for this AND no different/worse than usual)    Negative: Loss of vision or double vision    Negative: Extra heart beats OR irregular heart beating (i.e., 'palpitations')    Negative: Difficulty breathing    Negative: Drinking very little and has signs of dehydration (e.g., no urine > 12 hours, very dry mouth, very lightheaded)    Negative: Follows bleeding (e.g., stomach, rectum, vagina) (Exception: became dizzy from sight of small amount blood)    Negative: Patient sounds very sick or weak to the triager    Negative: Lightheadedness (dizziness) present now, after 2 hours of rest and fluids    Negative: Spinning or tilting sensation (vertigo) present now    Negative: Fever > 103 F (39.4 C)    Negative: Fever > 100.0 F (37.8 C) and has diabetes mellitus or a weak immune system (e.g., HIV positive, cancer chemotherapy, organ transplant, splenectomy, chronic steroids)    Negative: Vomiting occurs with dizziness    Patient wants to be seen    Answer Assessment - Initial Assessment Questions  1. DESCRIPTION: \"Describe your dizziness.\"      Just dizzy  2. LIGHTHEADED: \"Do you feel lightheaded?\" (e.g., somewhat faint, woozy, weak upon standing)      Feels worse when moving around   3. VERTIGO: \"Do you feel like either you or the room is spinning or tilting?\" (i.e. vertigo)     no  4. SEVERITY: \"How bad is it?\"  \"Do you feel like you are going to faint?\" \"Can you stand and walk?\"    - MILD - walking normally    - MODERATE - interferes with normal activities (e.g., work, school)     - SEVERE - unable to stand, requires support to walk, feels like passing out now.      moderate  5. ONSET:  \"When did the dizziness begin?\"     Last Saturday  6. AGGRAVATING FACTORS: \"Does anything make it worse?\" (e.g., standing, change in head position)      standing  7. HEART RATE: \"Can you tell me your heart rate?\" \"How many beats in 15 seconds?\"  (Note: not all patients can do " "this)        Feels normal, no pounding in chest  8. CAUSE: \"What do you think is causing the dizziness?\"     Not sure  9. RECURRENT SYMPTOM: \"Have you had dizziness before?\" If so, ask: \"When was the last time?\" \"What happened that time?\"      no  10. OTHER SYMPTOMS: \"Do you have any other symptoms?\" (e.g., fever, chest pain, vomiting, diarrhea, bleeding)        none  11. PREGNANCY: \"Is there any chance you are pregnant?\" \"When was your last menstrual period?\"        negative  She takes spironolactone , Zoloft, and nexplanon    Last Saturday had been working at [employer], dizzy lightheaded and blurry vision.  She states ever since then she feels like her head is really big and feels like she is here but not here    Protocols used: DIZZINESS-A-[place]      Carmen Llanes RN  "

## 2020-07-17 NOTE — PROGRESS NOTES
"SUBJECTIVE:   Mireya Yoon is a 20 year old female presenting with a chief complaint of   Chief Complaint   Patient presents with     Dizziness     since at work on Sat. and would get dizzy when stand up  and light headache     Headache     light headache and \"feel like head is really big\"     Ashley is a 20-year-old female presenting today with concern over dizziness.  She has had dizziness intermittently over the past 6 days for 5 bouts a day lasting approximately 5 minutes in duration.  Also mention a transient bout bout of blurry vision last week,  about 6 days ago while at work,  lasted about 15 minutes.  Also some difficulty with focusing while watching TV.  Her head feels full with mild headache and generalized biparietal in location generalized headache. Feeling weak and tired.     LMP ended on July 14th.     Has nexplanon placed in Dec. 2018, 2 consecutive nexplanon placement. Worse cramping since placing the nexplanon.      monthly bleeding \"Light for 5, heavy 7 and light for 5 days each month\"     She does work at [employer] and noticed the blurry vision episode while at [employer] also feeling somewhat confused transiently fortunately rarely. Denies ongoing vision changes or blurry vision.       Review of Systems   Constitutional: Negative for chills and fever.   HENT: Negative for congestion, ear pain, rhinorrhea and sore throat.    Respiratory: Negative for cough and shortness of breath.    Gastrointestinal: Negative for abdominal pain, diarrhea, nausea and vomiting.   Genitourinary: Positive for menstrual problem (hx of prolonged bleeding each cycle ). Negative for vaginal bleeding, vaginal discharge and vaginal pain.   Neurological: Positive for dizziness, weakness and headaches. Negative for syncope, light-headedness and numbness.       No past medical history on file.  Family History   Problem Relation Age of Onset     Hypertension Mother      Gallbladder Disease Mother      Unknown/Adopted Father      " Diabetes Maternal Grandmother      Glaucoma No family hx of      Macular Degeneration No family hx of      Current Outpatient Medications   Medication Sig Dispense Refill     ibuprofen (ADVIL,MOTRIN) 600 MG tablet Take 1 tablet (600 mg) by mouth every 8 hours as needed for moderate pain or pain 40 tablet 0     sertraline (ZOLOFT) 50 MG tablet Take 1 tablet (50 mg) by mouth daily 60 tablet 1     spironolactone (ALDACTONE) 100 MG tablet Take 100 mg by mouth daily        Social History     Tobacco Use     Smoking status: Never Smoker     Smokeless tobacco: Never Used   Substance Use Topics     Alcohol use: No     Alcohol/week: 0.0 standard drinks     Comment: seldom/rare       OBJECTIVE  BP (!) 142/84 (BP Location: Left arm, Patient Position: Sitting, Cuff Size: Adult Large)   Pulse 96   Temp 99.7  F (37.6  C) (Tympanic)   Resp 18   Wt 74.7 kg (164 lb 9.6 oz)   LMP 06/29/2020 (Exact Date)   SpO2 99%   BMI 26.17 kg/m      BP (!) 142/84 (BP Location: Left arm, Patient Position: Sitting, Cuff Size: Adult Large)   Pulse 96   Temp 99.7  F (37.6  C) (Tympanic)   Resp 18   Wt 74.7 kg (164 lb 9.6 oz)   LMP 06/29/2020 (Exact Date)   SpO2 99%   BMI 26.17 kg/m       Physical Exam  HENT:      Head: Normocephalic and atraumatic.      Right Ear: External ear normal.      Left Ear: External ear normal.      Nose: Nose normal.      Mouth/Throat:      Pharynx: No oropharyngeal exudate.   Eyes:      General: No scleral icterus.        Right eye: No discharge.         Left eye: No discharge.      Conjunctiva/sclera: Conjunctivae normal.      Pupils: Pupils are equal, round, and reactive to light.   Neck:      Musculoskeletal: Neck supple.      Thyroid: No thyromegaly.      Trachea: No tracheal deviation.   Cardiovascular:      Rate and Rhythm: Normal rate and regular rhythm.      Heart sounds: Normal heart sounds. No murmur. No friction rub. No gallop.    Pulmonary:      Effort: Pulmonary effort is normal. No respiratory  distress.      Breath sounds: Normal breath sounds. No stridor. No wheezing or rales.   Chest:      Chest wall: No tenderness.   Abdominal:      Palpations: Abdomen is soft. There is no mass.   Musculoskeletal:         General: No tenderness or deformity.   Lymphadenopathy:      Cervical: No cervical adenopathy.   Skin:     General: Skin is warm and dry.      Findings: No erythema or rash.   Neurological:      Mental Status: She is alert and oriented to person, place, and time.      Cranial Nerves: No cranial nerve deficit.   Psychiatric:         Judgment: Judgment normal.       Results for orders placed or performed in visit on 07/17/20   Hemoglobin     Status: None   Result Value Ref Range    Hemoglobin 12.5 11.7 - 15.7 g/dL      ASSESSMENT:    ICD-10-CM    1. Dizziness  R42 Hemoglobin        PLAN:  Consider dehydration as a primary cause of her dizziness when standing.  Possibly consider a cause of her headache.  Encouraged her to drink more than just water as  She may be somewhat electrolyte deficient as well.  She is due to see her primary care physician and will consider this within the next week certainly sooner if her symptoms persist further testing  Exam was otherwise reassuring although with her history of menorrhagia she may want to consider other alternative forms of contraception in addition to discussion about her spironolactone as to whether this may be contributing to her current dehydrationor irregular menorrhagia or metomenorrhagia.  Regular healthy meals were also encouraged  Apparently she has had CT evaluation of headaches in the past when she was much younger this was normal at that time.  Therefore I would discourage any imaging studies at this point time as her headache persists or worsens or she develops any focal neurological symptoms.  Sarthak Corbett MD

## 2020-07-19 ENCOUNTER — HOSPITAL ENCOUNTER (EMERGENCY)
Facility: CLINIC | Age: 20
Discharge: HOME | End: 2020-07-19
Attending: EMERGENCY MEDICINE | Admitting: EMERGENCY MEDICINE
Payer: COMMERCIAL

## 2020-07-19 VITALS
HEART RATE: 77 BPM | SYSTOLIC BLOOD PRESSURE: 133 MMHG | RESPIRATION RATE: 14 BRPM | DIASTOLIC BLOOD PRESSURE: 77 MMHG | BODY MASS INDEX: 26.81 KG/M2 | TEMPERATURE: 98 F | WEIGHT: 168.6 LBS | OXYGEN SATURATION: 100 %

## 2020-07-19 DIAGNOSIS — G44.89 HEADACHE SYNDROME: ICD-10-CM

## 2020-07-19 LAB
ALBUMIN UR-MCNC: NEGATIVE MG/DL
ANION GAP SERPL CALCULATED.3IONS-SCNC: 10 MMOL/L (ref 3–14)
APPEARANCE UR: CLEAR
BACTERIA #/AREA URNS HPF: ABNORMAL /HPF
BASOPHILS # BLD AUTO: 0 10E9/L (ref 0–0.2)
BASOPHILS NFR BLD AUTO: 0.2 %
BILIRUB UR QL STRIP: NEGATIVE
BUN SERPL-MCNC: 8 MG/DL (ref 7–30)
CALCIUM SERPL-MCNC: 9 MG/DL (ref 8.5–10.1)
CHLORIDE SERPL-SCNC: 106 MMOL/L (ref 94–109)
CO2 SERPL-SCNC: 23 MMOL/L (ref 20–32)
COLOR UR AUTO: ABNORMAL
CREAT SERPL-MCNC: 0.82 MG/DL (ref 0.52–1.04)
D DIMER PPP FEU-MCNC: 0.3 UG/ML FEU (ref 0–0.5)
DIFFERENTIAL METHOD BLD: NORMAL
EOSINOPHIL # BLD AUTO: 0.2 10E9/L (ref 0–0.7)
EOSINOPHIL NFR BLD AUTO: 1.8 %
ERYTHROCYTE [DISTWIDTH] IN BLOOD BY AUTOMATED COUNT: 12.4 % (ref 10–15)
GFR SERPL CREATININE-BSD FRML MDRD: >90 ML/MIN/{1.73_M2}
GLUCOSE SERPL-MCNC: 86 MG/DL (ref 70–99)
GLUCOSE UR STRIP-MCNC: NEGATIVE MG/DL
HCG UR QL: NEGATIVE
HCT VFR BLD AUTO: 38.5 % (ref 35–47)
HGB BLD-MCNC: 12.3 G/DL (ref 11.7–15.7)
HGB UR QL STRIP: NEGATIVE
IMM GRANULOCYTES # BLD: 0 10E9/L (ref 0–0.4)
IMM GRANULOCYTES NFR BLD: 0.4 %
KETONES UR STRIP-MCNC: NEGATIVE MG/DL
LEUKOCYTE ESTERASE UR QL STRIP: NEGATIVE
LYMPHOCYTES # BLD AUTO: 2.6 10E9/L (ref 0.8–5.3)
LYMPHOCYTES NFR BLD AUTO: 31.1 %
MCH RBC QN AUTO: 27 PG (ref 26.5–33)
MCHC RBC AUTO-ENTMCNC: 31.9 G/DL (ref 31.5–36.5)
MCV RBC AUTO: 85 FL (ref 78–100)
MONOCYTES # BLD AUTO: 0.6 10E9/L (ref 0–1.3)
MONOCYTES NFR BLD AUTO: 7.2 %
MUCOUS THREADS #/AREA URNS LPF: PRESENT /LPF
NEUTROPHILS # BLD AUTO: 4.9 10E9/L (ref 1.6–8.3)
NEUTROPHILS NFR BLD AUTO: 59.3 %
NITRATE UR QL: NEGATIVE
NRBC # BLD AUTO: 0 10*3/UL
NRBC BLD AUTO-RTO: 0 /100
PH UR STRIP: 6.5 PH (ref 5–7)
PLATELET # BLD AUTO: 333 10E9/L (ref 150–450)
POTASSIUM SERPL-SCNC: 3.3 MMOL/L (ref 3.4–5.3)
RBC # BLD AUTO: 4.55 10E12/L (ref 3.8–5.2)
RBC #/AREA URNS AUTO: 0 /HPF (ref 0–2)
SODIUM SERPL-SCNC: 139 MMOL/L (ref 133–144)
SOURCE: ABNORMAL
SP GR UR STRIP: 1.01 (ref 1–1.03)
SQUAMOUS #/AREA URNS AUTO: <1 /HPF (ref 0–1)
UROBILINOGEN UR STRIP-MCNC: NORMAL MG/DL (ref 0–2)
WBC # BLD AUTO: 8.2 10E9/L (ref 4–11)
WBC #/AREA URNS AUTO: 1 /HPF (ref 0–5)

## 2020-07-19 PROCEDURE — 99284 EMERGENCY DEPT VISIT MOD MDM: CPT | Mod: 25 | Performed by: EMERGENCY MEDICINE

## 2020-07-19 PROCEDURE — 80048 BASIC METABOLIC PNL TOTAL CA: CPT | Performed by: EMERGENCY MEDICINE

## 2020-07-19 PROCEDURE — 85379 FIBRIN DEGRADATION QUANT: CPT | Performed by: EMERGENCY MEDICINE

## 2020-07-19 PROCEDURE — 96374 THER/PROPH/DIAG INJ IV PUSH: CPT | Performed by: EMERGENCY MEDICINE

## 2020-07-19 PROCEDURE — 85025 COMPLETE CBC W/AUTO DIFF WBC: CPT | Performed by: EMERGENCY MEDICINE

## 2020-07-19 PROCEDURE — 93005 ELECTROCARDIOGRAM TRACING: CPT | Performed by: EMERGENCY MEDICINE

## 2020-07-19 PROCEDURE — 93010 ELECTROCARDIOGRAM REPORT: CPT | Mod: Z6 | Performed by: EMERGENCY MEDICINE

## 2020-07-19 PROCEDURE — 96375 TX/PRO/DX INJ NEW DRUG ADDON: CPT | Performed by: EMERGENCY MEDICINE

## 2020-07-19 PROCEDURE — 81025 URINE PREGNANCY TEST: CPT | Performed by: EMERGENCY MEDICINE

## 2020-07-19 PROCEDURE — 25000128 H RX IP 250 OP 636: Performed by: EMERGENCY MEDICINE

## 2020-07-19 PROCEDURE — 25800030 ZZH RX IP 258 OP 636: Performed by: EMERGENCY MEDICINE

## 2020-07-19 PROCEDURE — 96361 HYDRATE IV INFUSION ADD-ON: CPT | Performed by: EMERGENCY MEDICINE

## 2020-07-19 PROCEDURE — 81001 URINALYSIS AUTO W/SCOPE: CPT | Performed by: EMERGENCY MEDICINE

## 2020-07-19 RX ORDER — BUTALBITAL, ACETAMINOPHEN AND CAFFEINE 50; 325; 40 MG/1; MG/1; MG/1
1-2 TABLET ORAL EVERY 8 HOURS PRN
Qty: 30 TABLET | Refills: 0 | Status: SHIPPED | OUTPATIENT
Start: 2020-07-19 | End: 2020-08-06

## 2020-07-19 RX ORDER — DIPHENHYDRAMINE HYDROCHLORIDE 50 MG/ML
12.5 INJECTION INTRAMUSCULAR; INTRAVENOUS ONCE
Status: COMPLETED | OUTPATIENT
Start: 2020-07-19 | End: 2020-07-19

## 2020-07-19 RX ORDER — METOCLOPRAMIDE HYDROCHLORIDE 5 MG/ML
5 INJECTION INTRAMUSCULAR; INTRAVENOUS ONCE
Status: COMPLETED | OUTPATIENT
Start: 2020-07-19 | End: 2020-07-19

## 2020-07-19 RX ADMIN — DIPHENHYDRAMINE HYDROCHLORIDE 12.5 MG: 50 INJECTION, SOLUTION INTRAMUSCULAR; INTRAVENOUS at 17:45

## 2020-07-19 RX ADMIN — METOCLOPRAMIDE 5 MG: 5 INJECTION, SOLUTION INTRAMUSCULAR; INTRAVENOUS at 17:46

## 2020-07-19 RX ADMIN — SODIUM CHLORIDE 1000 ML: 9 INJECTION, SOLUTION INTRAVENOUS at 17:51

## 2020-07-19 NOTE — DISCHARGE INSTRUCTIONS
A referral has been placed in the computer system for you to be seen by neurology.  They should call you in the next few days to set up an appointment.  If they do not call you please call them at [phone] to set up an appointment to be seen in the next 1 to 2 weeks.

## 2020-07-19 NOTE — ED NOTES
Patient presents today with complaints of head pressure and dizziness for the past 7 days. Symptoms are now worsening and starting to be painful at 4/10 today. Patient complains of constant squeezing head pain starting at around 4:30pm today.

## 2020-07-19 NOTE — ED AVS SNAPSHOT
[facility], [place], Emergency Department  [address]  [facility] [address]  Phone:  [phone]  Fax:  [phone]                                    Mireya Yoon   MRN: [id number]    Department:  [facility], Emergency Department   Date of Visit:  7/19/2020           After Visit Summary Signature Page    I have received my discharge instructions, and my questions have been answered. I have discussed any challenges I see with this plan with the nurse or doctor.    ..........................................................................................................................................  Patient/Patient Representative Signature      ..........................................................................................................................................  Patient Representative Print Name and Relationship to Patient    ..................................................               ................................................  Date                                   Time    ..........................................................................................................................................  Reviewed by Signature/Title    ...................................................              ..............................................  Date                                               Time          22EPIC Rev 08/18

## 2020-07-19 NOTE — ED PROVIDER NOTES
[facility] EMERGENCY DEPARTMENT ([facility])    7/19/20        History     Chief Complaint   Patient presents with     Dizziness     feeling lightheaded and pressure in her head for about a week.     HPI  Mireya Yoon is an otherwise healthy 20 year old female who presents to the Emergency Department for feelings of lightheadedness and feeling like her head is expanding over the past week.  Patient denies any true vertigo type symptoms but states that she had a near syncopal episode at work.  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  Patient denies any vomiting denies any diarrhea denies any coughing or shortness of breath.  Patient states that she recently was seen in urgent care because she thought she might be anemic but they checked her hemoglobin and it was fine.      I have reviewed the Medications, Allergies, Past Medical and Surgical History, and Social History in the [facility] system.    PAST MEDICAL HISTORY:   Past Medical History:   Diagnosis Date     Anxiety        PAST SURGICAL HISTORY: History reviewed. No pertinent surgical history.    Past medical history, past surgical history, medications, and allergies were reviewed with the patient. Additional pertinent items: None    FAMILY HISTORY:   Family History   Problem Relation Age of Onset     Hypertension Mother      Gallbladder Disease Mother      Unknown/Adopted Father      Diabetes Maternal Grandmother      Glaucoma No family hx of      Macular Degeneration No family hx of        SOCIAL HISTORY:   Social History     Tobacco Use     Smoking status: Never Smoker     Smokeless tobacco: Never Used   Substance Use Topics     Alcohol use: No     Alcohol/week: 0.0 standard drinks     Comment: seldom/rare     Social history was reviewed with the patient. Additional pertinent items: None      Patient's Medications    IBUPROFEN (ADVIL,MOTRIN) 600 MG TABLET    Take 1 tablet (600 mg) by mouth every 8 hours as needed for moderate pain or  pain    SERTRALINE (ZOLOFT) 50 MG TABLET    Take 1 tablet (50 mg) by mouth daily    SPIRONOLACTONE (ALDACTONE) 100 MG TABLET    Take 100 mg by mouth daily           Allergies   Allergen Reactions     Seasonal Allergies         Review of Systems  A complete review of systems was performed with pertinent positives and negatives noted in the HPI, and all other systems negative.    Physical Exam   BP: (!) 143/78  Pulse: 92  Temp: 98  F (36.7  C)  Resp: 16  Weight: 76.5 kg (168 lb 9.6 oz)  SpO2: 98 %  Lying Orthostatic BP: 135/77  Lying Orthostatic Pulse: 95 bpm  Sitting Orthostatic BP: 133/83  Sitting Orthostatic Pulse: 96 bpm  Standing Orthostatic BP: 136/98  Standing Orthostatic Pulse: 102 bpm      Physical Exam  Vitals signs and nursing note reviewed.   Constitutional:       General: She is not in acute distress.     Appearance: Normal appearance. She is not diaphoretic.   HENT:      Head: Atraumatic.   Eyes:      Extraocular Movements: Extraocular movements intact.      Pupils: Pupils are equal, round, and reactive to light.   Neck:      Musculoskeletal: Neck supple.   Cardiovascular:      Rate and Rhythm: Regular rhythm.   Pulmonary:      Breath sounds: Normal breath sounds.   Abdominal:      Palpations: Abdomen is soft.      Tenderness: There is no abdominal tenderness.   Musculoskeletal:         General: No deformity.   Skin:     General: Skin is warm.   Neurological:      General: No focal deficit present.      Mental Status: She is alert and oriented to person, place, and time.   Psychiatric:         Mood and Affect: Mood normal.         ED Course     Orders Placed This Encounter - initially   Procedures     CBC with platelets differential     Basic metabolic panel     UA with Microscopic reflex to Culture     HCG qualitative urine     D dimer quantitative     EKG 12 lead     Peripheral IV catheter     Orthostatic blood pressure and pulse          Procedures           Patient's EKG revealed a normal sinus  rhythm at a rate of 90 with a IN interval of 0.122 and a QRS duration of 0.086.  The patient had a normal axis with no acute ST or T wave changes and no interval changes suggestive of preexcitation syndrome or any evidence of susceptibility to arrhythmias.  This was read by me personally.    Patient had orthostatic vital signs done by nursing personnel which revealed no orthostatic changes.    Medications   sodium chloride (PF) 0.9% PF flush 3 mL (has no administration in time range)   sodium chloride (PF) 0.9% PF flush 3 mL (has no administration in time range)   0.9% sodium chloride BOLUS (0 mLs Intravenous Stopped 7/19/20 1848)   metoclopramide (REGLAN) injection 5 mg (5 mg Intravenous Given 7/19/20 1746)   diphenhydrAMINE (BENADRYL) injection 12.5 mg (12.5 mg Intravenous Given 7/19/20 1745)     Results for orders placed or performed during the hospital encounter of 07/19/20 (from the past 24 hour(s))   EKG 12 lead   Result Value Ref Range    Interpretation ECG Click View Image link to view waveform and result    CBC with platelets differential   Result Value Ref Range    WBC 8.2 4.0 - 11.0 10e9/L    RBC Count 4.55 3.8 - 5.2 10e12/L    Hemoglobin 12.3 11.7 - 15.7 g/dL    Hematocrit 38.5 35.0 - 47.0 %    MCV 85 78 - 100 fl    MCH 27.0 26.5 - 33.0 pg    MCHC 31.9 31.5 - 36.5 g/dL    RDW 12.4 10.0 - 15.0 %    Platelet Count 333 150 - 450 10e9/L    Diff Method Automated Method     % Neutrophils 59.3 %    % Lymphocytes 31.1 %    % Monocytes 7.2 %    % Eosinophils 1.8 %    % Basophils 0.2 %    % Immature Granulocytes 0.4 %    Nucleated RBCs 0 0 /100    Absolute Neutrophil 4.9 1.6 - 8.3 10e9/L    Absolute Lymphocytes 2.6 0.8 - 5.3 10e9/L    Absolute Monocytes 0.6 0.0 - 1.3 10e9/L    Absolute Eosinophils 0.2 0.0 - 0.7 10e9/L    Absolute Basophils 0.0 0.0 - 0.2 10e9/L    Abs Immature Granulocytes 0.0 0 - 0.4 10e9/L    Absolute Nucleated RBC 0.0    Basic metabolic panel   Result Value Ref Range    Sodium 139 133 - 144  mmol/L    Potassium 3.3 (L) 3.4 - 5.3 mmol/L    Chloride 106 94 - 109 mmol/L    Carbon Dioxide 23 20 - 32 mmol/L    Anion Gap 10 3 - 14 mmol/L    Glucose 86 70 - 99 mg/dL    Urea Nitrogen 8 7 - 30 mg/dL    Creatinine 0.82 0.52 - 1.04 mg/dL    GFR Estimate >90 >60 mL/min/[1.73_m2]    GFR Estimate If Black >90 >60 mL/min/[1.73_m2]    Calcium 9.0 8.5 - 10.1 mg/dL   UA with Microscopic reflex to Culture    Specimen: Urine clean catch; Midstream Urine   Result Value Ref Range    Color Urine Light Yellow     Appearance Urine Clear     Glucose Urine Negative NEG^Negative mg/dL    Bilirubin Urine Negative NEG^Negative    Ketones Urine Negative NEG^Negative mg/dL    Specific Gravity Urine 1.007 1.003 - 1.035    Blood Urine Negative NEG^Negative    pH Urine 6.5 5.0 - 7.0 pH    Protein Albumin Urine Negative NEG^Negative mg/dL    Urobilinogen mg/dL Normal 0.0 - 2.0 mg/dL    Nitrite Urine Negative NEG^Negative    Leukocyte Esterase Urine Negative NEG^Negative    Source Midstream Urine     WBC Urine 1 0 - 5 /HPF    RBC Urine 0 0 - 2 /HPF    Bacteria Urine Few (A) NEG^Negative /HPF    Squamous Epithelial /HPF Urine <1 0 - 1 /HPF    Mucous Urine Present (A) NEG^Negative /LPF   HCG qualitative urine   Result Value Ref Range    HCG Qual Urine Negative NEG^Negative   D dimer quantitative   Result Value Ref Range    D Dimer 0.3 0.0 - 0.50 ug/ml FEU       Assessments & Plan (with Medical Decision Making)     I have reviewed the nursing notes.    Patient reported improvement in her headache with the above medications after 45 minutes.  She still complained of some pressure in her head however and some slight blurriness of vision.  At this point her d-dimer is negative and while the differential has included a sinus venous thrombosis I do not believe an MRI currently is indicated.  The patient has had head CT done before for headaches in the past which has been negative according to the mother and according to the primary care notes in  epic.  With improvement of her symptoms with the medications above this may be an atypical migraine.  Differential diagnosis at this time includes atypical migraine versus severe muscle tension headache with migraine features and while her birth control makes her susceptible to a cavernous thrombosis I think this is unlikely at this time with her negative d-dimer.  Also in the differential diagnosis is temporal lobe seizures.    I discussed this with the mother and patient and at this time patient be sent home with the medication below as a trial for her symptoms.  Meanwhile, a referral will be placed in the computer system for her to follow-up for neurology consultation with consideration of EEG and/or MRI.    I have reviewed the findings, diagnosis, plan and need for follow up with the patient.    New Prescriptions    BUTALBITAL-ACETAMINOPHEN-CAFFEINE (ESGIC) -40 MG TABLET    Take 1-2 tablets by mouth every 8 hours as needed for headaches       Final diagnoses:   Headache syndrome     A referral has been placed in the computer system for you to be seen by neurology.  They should call you in the next few days to set up an appointment.  If they do not call you please call them at [phone] to set up an appointment to be seen in the next 1 to 2 weeks.    Chvey Zeng MD, MD    7/19/2020   [facility], [place], EMERGENCY DEPARTMENT     Chevy Zeng MD  07/19/20 3366

## 2020-07-20 LAB — INTERPRETATION ECG - MUSE: NORMAL

## 2020-07-27 ENCOUNTER — DOCUMENTATION ONLY (OUTPATIENT)
Dept: CARE COORDINATION | Facility: CLINIC | Age: 20
End: 2020-07-27

## 2020-07-30 ENCOUNTER — OFFICE VISIT (OUTPATIENT)
Dept: NEUROLOGY | Facility: CLINIC | Age: 20
End: 2020-07-30
Attending: EMERGENCY MEDICINE
Payer: COMMERCIAL

## 2020-07-30 ENCOUNTER — ANCILLARY PROCEDURE (OUTPATIENT)
Dept: MRI IMAGING | Facility: CLINIC | Age: 20
End: 2020-07-30
Attending: NURSE PRACTITIONER
Payer: COMMERCIAL

## 2020-07-30 VITALS
DIASTOLIC BLOOD PRESSURE: 77 MMHG | OXYGEN SATURATION: 99 % | RESPIRATION RATE: 16 BRPM | HEART RATE: 89 BPM | SYSTOLIC BLOOD PRESSURE: 125 MMHG

## 2020-07-30 DIAGNOSIS — H53.9 VISUAL DISTURBANCE: ICD-10-CM

## 2020-07-30 DIAGNOSIS — Z09 HOSPITAL DISCHARGE FOLLOW-UP: ICD-10-CM

## 2020-07-30 DIAGNOSIS — R42 DIZZINESS: ICD-10-CM

## 2020-07-30 DIAGNOSIS — R51.9 ACUTE INTRACTABLE HEADACHE, UNSPECIFIED HEADACHE TYPE: Primary | ICD-10-CM

## 2020-07-30 DIAGNOSIS — R51.9 ACUTE INTRACTABLE HEADACHE, UNSPECIFIED HEADACHE TYPE: ICD-10-CM

## 2020-07-30 LAB
ANION GAP SERPL CALCULATED.3IONS-SCNC: 6 MMOL/L (ref 3–14)
BUN SERPL-MCNC: 10 MG/DL (ref 7–30)
CALCIUM SERPL-MCNC: 9.1 MG/DL (ref 8.5–10.1)
CHLORIDE SERPL-SCNC: 110 MMOL/L (ref 94–109)
CO2 SERPL-SCNC: 23 MMOL/L (ref 20–32)
CREAT SERPL-MCNC: 0.71 MG/DL (ref 0.52–1.04)
CRP SERPL-MCNC: <2.9 MG/L (ref 0–8)
ERYTHROCYTE [SEDIMENTATION RATE] IN BLOOD BY WESTERGREN METHOD: 7 MM/H (ref 0–20)
GFR SERPL CREATININE-BSD FRML MDRD: >90 ML/MIN/{1.73_M2}
GLUCOSE SERPL-MCNC: 71 MG/DL (ref 70–99)
POTASSIUM SERPL-SCNC: 3.7 MMOL/L (ref 3.4–5.3)
SODIUM SERPL-SCNC: 138 MMOL/L (ref 133–144)

## 2020-07-30 RX ORDER — GADOBUTROL 604.72 MG/ML
7.5 INJECTION INTRAVENOUS ONCE
Status: COMPLETED | OUTPATIENT
Start: 2020-07-30 | End: 2020-07-30

## 2020-07-30 RX ORDER — NAPROXEN 500 MG/1
500 TABLET ORAL EVERY 12 HOURS PRN
Qty: 30 TABLET | Refills: 3 | Status: SHIPPED | OUTPATIENT
Start: 2020-07-30 | End: 2021-05-05

## 2020-07-30 RX ADMIN — GADOBUTROL 7.5 ML: 604.72 INJECTION INTRAVENOUS at 14:40

## 2020-07-30 ASSESSMENT — PAIN SCALES - GENERAL: PAINLEVEL: NO PAIN (0)

## 2020-07-30 NOTE — LETTER
"7/30/2020       RE: Mireya Yoon  [address]  [place] [address]     Dear Colleague,    Thank you for referring your patient, Mireya Yoon, to the [facility] NEUROLOGY at [facility]. Please see a copy of my visit note below.    Re: Mireya Yoon      MRN# [id number]  YOB: 2000  Date of Visit:7/30/2020    OUTPATIENT NEUROLOGY VISIT NOTE    Chief Complaint:  Headache evaluation    History of Present Illness  Mireya Yoon is a 20-year-old female presents to the [facility] headache clinic today for headache evaluation and hospital/ED discharge follow-up  Accompanied to today's visit in our clinic by her mother.     Previous neurology headache evaluation: [facility] for headaches since [employer] School, outside neurology notes available for review  Headache history: Reports random headaches in between and throughout the college. 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   Patient reports that she was at work on 7/11/2020 and got very dizzy and blurry vision for about 15-20 minutes and then feeling pressure in the head and reports that she took acetaminophen and was given a few days. Went to  and ED. Patient reports that symptoms were going on for a couple weeks.   Patient reports that she cannot focus and feeling high.   Reports that she had some light sensitivity and noise sensitivity when headache is more intense headache.   Patient reports that her vision is fine but when she tries focus she feels like her brain is not \"registering\"  Reports that she still has pressure feeling in the head. Reports that headache worse with movement and lifting.   Able to sleep, drink or eat.   No medication changes  Headache worsen with movements and lights.   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    ED note and treatment 7/19/2020 reviewed   Medications   sodium chloride (PF) 0.9% PF flush 3 mL (has no administration in time range)   sodium chloride (PF) 0.9% PF flush 3 mL (has no administration in time range)   0.9% sodium chloride BOLUS (0 mLs Intravenous Stopped 7/19/20 1848)   metoclopramide (REGLAN) injection 5 mg (5 mg Intravenous Given 7/19/20 1746)   diphenhydrAMINE (BENADRYL) injection 12.5 mg (12.5 mg Intravenous Given 7/19/20 1745)     Neurodiagnostic Testing  CT head none  MRI brain none  Labs reviewed  Results for NADIA DA SILVA (MRN [id number]) as of 7/30/2020 12:59   Ref. Range 7/19/2020 17:22   Sodium Latest Ref Range: 133 - 144 mmol/L 139   Potassium Latest Ref Range: 3.4 - 5.3 mmol/L 3.3 (L)   Chloride Latest Ref Range: 94 - 109 mmol/L 106   Carbon Dioxide Latest Ref Range: 20 - 32 mmol/L 23   Urea Nitrogen Latest Ref Range: 7 - 30 mg/dL 8   Creatinine Latest Ref Range: 0.52 - 1.04 mg/dL 0.82   GFR Estimate Latest Ref Range: >60 mL/min/1.73_m2 >90   GFR Estimate If Black Latest Ref Range: >60 mL/min/1.73_m2 >90   Calcium Latest Ref Range: 8.5 - 10.1 mg/dL 9.0   Anion Gap Latest Ref Range: 3 - 14 mmol/L 10   HCG Qual Urine Latest Ref Range: NEG^Negative  Negative   Results for NADIA DA SILVA (MRN [id number]) as of 7/30/2020 12:59   Ref. Range 7/19/2020 17:22   WBC Latest Ref Range: 4.0 - 11.0 10e9/L 8.2   Hemoglobin Latest Ref Range: 11.7 - 15.7 g/dL 12.3   Hematocrit Latest Ref Range: 35.0 - 47.0 % 38.5   Platelet Count Latest Ref Range: 150 - 450 10e9/L 333   RBC Count Latest Ref Range: 3.8 - 5.2 10e12/L 4.55   MCV Latest Ref Range: 78 - 100 fl 85   MCH Latest Ref Range: 26.5 - 33.0 pg 27.0    MCHC Latest Ref Range: 31.5 - 36.5 g/dL 31.9   RDW Latest Ref Range: 10.0 - 15.0 % 12.4   Diff Method Unknown Automated Method   % Neutrophils Latest Units: % 59.3   % Lymphocytes Latest Units: % 31.1   % Monocytes Latest Units: % 7.2   % Eosinophils Latest Units: % 1.8   % Basophils Latest Units: % 0.2   % Immature Granulocytes Latest Units: % 0.4   Nucleated RBCs Latest Ref Range: 0 /100 0   Absolute Neutrophil Latest Ref Range: 1.6 - 8.3 10e9/L 4.9   Absolute Lymphocytes Latest Ref Range: 0.8 - 5.3 10e9/L 2.6   Absolute Monocytes Latest Ref Range: 0.0 - 1.3 10e9/L 0.6   Absolute Eosinophils Latest Ref Range: 0.0 - 0.7 10e9/L 0.2   Absolute Basophils Latest Ref Range: 0.0 - 0.2 10e9/L 0.0   Abs Immature Granulocytes Latest Ref Range: 0 - 0.4 10e9/L 0.0   Absolute Nucleated RBC Unknown 0.0     Past Medical History reviewed and verified with the patient  Past Medical History:   Diagnosis Date     Anxiety      Past Surgical History reviewed and verified with the patient  None    Family History reviewed and verified with the patient  Mother -migraine headaches and WPW  Social History     Tobacco Use     Smoking status: Never Smoker     Smokeless tobacco: Never Used   Substance Use Topics     Alcohol use: No     Alcohol/week: 0.0 standard drinks     Comment: seldom/rare    reviewed and verified with the patient     Allergies   Allergen Reactions     Seasonal Allergies        Current Outpatient Medications   Medication Sig Dispense Refill     butalbital-acetaminophen-caffeine (ESGIC) -40 MG tablet Take 1-2 tablets by mouth every 8 hours as needed for headaches 30 tablet 0     ibuprofen (ADVIL,MOTRIN) 600 MG tablet Take 1 tablet (600 mg) by mouth every 8 hours as needed for moderate pain or pain 40 tablet 0     sertraline (ZOLOFT) 50 MG tablet Take 1 tablet (50 mg) by mouth daily 60 tablet 1     spironolactone (ALDACTONE) 100 MG tablet Take 100 mg by mouth daily      reviewed and verified with the  patient    Review of Systems:  A 12-point ROS including constitutional, eyes, ENT, respiratory, cardiovascular, gastroenterology, genitourinary, integumentary, musculoskeletal, neurology, hematology and psychiatric were all reviewed with the patient and completed at the Neuroscience Services Question guilherme and as mentioned in the HPI.   General Exam:   /77   Pulse 89   Resp 16   SpO2 99%   GEN: Awake, NAD; good eye contact, responses appropriately , healthy apearing, pressure and fogginess feeling.   Mentation appears normal, affect normal/bright, judgement and insight intact, normal speech and appearance well-groomed.  HEENT: Head atraumatic/Normocephalic. Scalp normal. Pupils equally round, 4 mm, reactive to light and accommodation, sclera and conjunctiva normal. Fundoscopic examination reveals normal vessels no papilledema.   Neck: Easily moveable without resistance  Heart: S1/S2 appreciated, RRR, no m/r/g, no carotid bruits  Lungs:Lungs are clear to auscultation bilaterally, no wheezes or crackles.   Neurological Examination:  The patient is alert and oriented times four. Has good attention and concentration.  Speech is fluent without dysarthria.   Cranial nerves:  CN I deferred.   CN II: Intact and full visual fields to confrontation bilaterally. Funduscopic exam revealed disc bilaterally.   CN III, IV, VI: EOM intact. There is no nystagmus. Has conjugated gaze. Intact direct and consensual pupillary light reflexes.   CN V: Intact and symmetrical to facial sensation in the V1 through V3 bilaterally.   CN VII: Intact and symmetrical eyebrow and lid raise and eyelid closure, smiles and frown.   CN VIII: Intact to finger rub bilaterally.   CN IX and X: The palates elevates symmetrical. The uvula is midline.   CN XII: The tongue protrudes midline with no atrophy or fasciculations.   Motor exam: The patient has a normal bulk and tone throughout. There is no atrophy, fasciculations, clonus, or abnormal  movements appreciated.   Strength Exam:  5/5 strength at shoulder abduction, elbow flexion or extension, wrist flexion or extension, finger abduction, , hip flexion and extension, knee flexion and extension, and dorsiflexion and plantarflexion bilaterally.   Sensation is intact to light touch and pinprick throughout.   Reflexes are 2+ and symmetrical at biceps, triceps, brachioradialis, patellar, and Achilles.   Negative Babinski with downgoing toes bilaterally.   Coordination reveals finger-nose-finger with normal speed and accuracy.   Station and gait is normal. There is no ataxia. Can walk on the toes, heels, and tandem walk without difficulty.Has no drift and a negative Romberg. Lidiae's negative        Assessment and Plan:  ED follow-up, headache with vertigo.  History of feeling lightheadedness, brain foggy feeling, vision blurriness and feeling like her head is expanding over the past week.  Acute onset of the symptoms.  Nonfocal neurological exam today which is reassuring.   ED work-up negative which adds reassurance as well.  Differentials migraine exacerbation, migrainous vertigo, headache due to sinus problems, infection, tumor, bleeding, thrombosis    plan:  Brain MRI with and without   Lab-BMP, ESR, CRP  Stay hydrated  Try naproxen 500 mg every 12 hours as needed for acute headache treatment   Stop Fioricet  Ophthalmology evaluation  Follow up video after the imaging to discuss treatment plan    I discussed all my recommendation with Mireya Yoon. The patient verbalizes understanding and comfortable with the plan. The patient has our clinic phone number to call with any questions or concerns. All of the patient's questions were answered from the best of my current knowledge.     Thank you for letting me be a part of the treatment team for Mireya Yoon  Time spent with pt answering questions, discussing findings, counseling and coordinating care was more than 50% the appointment time, 50 minutes.        SHANTELL Auguste, CNP  [facility]

## 2020-07-30 NOTE — PATIENT INSTRUCTIONS
Plan:  Brain MRI with and without   Lab-BMP, ESR, CRP  Stay hydrated  Try naproxen 500 mg every 12 hours as needed for acute headache treatment   Stop Fioricet  Ophthalmology evaluation  Follow up video after the imaging to discuss treatment plan

## 2020-07-30 NOTE — NURSING NOTE
Chief Complaint   Patient presents with     Hospital F/U     [facility] HOSPITAL F/U HEADACHE       Bryon Zhong, EMT

## 2020-07-30 NOTE — PROGRESS NOTES
"Re: Mireya Yoon      MRN# [id number]  YOB: 2000  Date of Visit:7/30/2020    OUTPATIENT NEUROLOGY VISIT NOTE    Chief Complaint:  Headache evaluation    History of Present Illness  Mireya Yoon is a 20-year-old female presents to the [facility] headache clinic today for headache evaluation and hospital/ED discharge follow-up  Accompanied to today's visit in our clinic by her mother.     Previous neurology headache evaluation: [facility] for headaches since [employer] School, outside neurology notes available for review  Headache history: Reports random headaches in between and throughout the college. 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   Patient reports that she was at work on 7/11/2020 and got very dizzy and blurry vision for about 15-20 minutes and then feeling pressure in the head and reports that she took acetaminophen and was given a few days. Went to  and ED. Patient reports that symptoms were going on for a couple weeks.   Patient reports that she cannot focus and feeling high.   Reports that she had some light sensitivity and noise sensitivity when headache is more intense headache.   Patient reports that her vision is fine but when she tries focus she feels like her brain is not \"registering\"  Reports that she still has pressure feeling in the head. Reports that headache worse with movement and lifting.   Able to sleep, drink or eat.   No medication changes  Headache worsen with movements and lights.   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    ED note and treatment 7/19/2020 reviewed   Medications   sodium chloride (PF) 0.9% PF flush 3 mL (has no administration in time range)   sodium chloride (PF) 0.9% PF flush 3 mL (has no administration in time range)   0.9% sodium chloride BOLUS (0 mLs Intravenous Stopped 7/19/20 1848)   metoclopramide (REGLAN) injection 5 mg (5 mg Intravenous Given 7/19/20 1746)   diphenhydrAMINE (BENADRYL) injection 12.5 mg (12.5 mg Intravenous Given 7/19/20 1745)     Neurodiagnostic Testing  CT head none  MRI brain none  Labs reviewed  Results for NADIA DA SILVA (MRN [id number]) as of 7/30/2020 12:59   Ref. Range 7/19/2020 17:22   Sodium Latest Ref Range: 133 - 144 mmol/L 139   Potassium Latest Ref Range: 3.4 - 5.3 mmol/L 3.3 (L)   Chloride Latest Ref Range: 94 - 109 mmol/L 106   Carbon Dioxide Latest Ref Range: 20 - 32 mmol/L 23   Urea Nitrogen Latest Ref Range: 7 - 30 mg/dL 8   Creatinine Latest Ref Range: 0.52 - 1.04 mg/dL 0.82   GFR Estimate Latest Ref Range: >60 mL/min/1.73_m2 >90   GFR Estimate If Black Latest Ref Range: >60 mL/min/1.73_m2 >90   Calcium Latest Ref Range: 8.5 - 10.1 mg/dL 9.0   Anion Gap Latest Ref Range: 3 - 14 mmol/L 10   HCG Qual Urine Latest Ref Range: NEG^Negative  Negative   Results for NADIA DA SILVA (MRN [id number]) as of 7/30/2020 12:59   Ref. Range 7/19/2020 17:22   WBC Latest Ref Range: 4.0 - 11.0 10e9/L 8.2   Hemoglobin Latest Ref Range: 11.7 - 15.7 g/dL 12.3   Hematocrit Latest Ref Range: 35.0 - 47.0 % 38.5   Platelet Count Latest Ref Range: 150 - 450 10e9/L 333   RBC Count Latest Ref Range: 3.8 - 5.2 10e12/L 4.55   MCV Latest Ref Range: 78 - 100 fl 85   MCH Latest Ref Range: 26.5 - 33.0 pg 27.0   MCHC Latest Ref Range: 31.5 - 36.5 g/dL 31.9   RDW Latest Ref Range: 10.0 - 15.0 % 12.4   Diff Method Unknown Automated Method   % Neutrophils Latest Units: % 59.3   % Lymphocytes Latest Units: % 31.1   % Monocytes Latest Units: % 7.2   % Eosinophils Latest Units: %  1.8   % Basophils Latest Units: % 0.2   % Immature Granulocytes Latest Units: % 0.4   Nucleated RBCs Latest Ref Range: 0 /100 0   Absolute Neutrophil Latest Ref Range: 1.6 - 8.3 10e9/L 4.9   Absolute Lymphocytes Latest Ref Range: 0.8 - 5.3 10e9/L 2.6   Absolute Monocytes Latest Ref Range: 0.0 - 1.3 10e9/L 0.6   Absolute Eosinophils Latest Ref Range: 0.0 - 0.7 10e9/L 0.2   Absolute Basophils Latest Ref Range: 0.0 - 0.2 10e9/L 0.0   Abs Immature Granulocytes Latest Ref Range: 0 - 0.4 10e9/L 0.0   Absolute Nucleated RBC Unknown 0.0     Past Medical History reviewed and verified with the patient  Past Medical History:   Diagnosis Date     Anxiety      Past Surgical History reviewed and verified with the patient  None    Family History reviewed and verified with the patient  Mother -migraine headaches and WPW  Social History     Tobacco Use     Smoking status: Never Smoker     Smokeless tobacco: Never Used   Substance Use Topics     Alcohol use: No     Alcohol/week: 0.0 standard drinks     Comment: seldom/rare    reviewed and verified with the patient     Allergies   Allergen Reactions     Seasonal Allergies        Current Outpatient Medications   Medication Sig Dispense Refill     butalbital-acetaminophen-caffeine (ESGIC) -40 MG tablet Take 1-2 tablets by mouth every 8 hours as needed for headaches 30 tablet 0     ibuprofen (ADVIL,MOTRIN) 600 MG tablet Take 1 tablet (600 mg) by mouth every 8 hours as needed for moderate pain or pain 40 tablet 0     sertraline (ZOLOFT) 50 MG tablet Take 1 tablet (50 mg) by mouth daily 60 tablet 1     spironolactone (ALDACTONE) 100 MG tablet Take 100 mg by mouth daily      reviewed and verified with the patient    Review of Systems:  A 12-point ROS including constitutional, eyes, ENT, respiratory, cardiovascular, gastroenterology, genitourinary, integumentary, musculoskeletal, neurology, hematology and psychiatric were all reviewed with the patient and completed at the Neuroscience  Services Question nary and as mentioned in the HPI.   General Exam:   /77   Pulse 89   Resp 16   SpO2 99%   GEN: Awake, NAD; good eye contact, responses appropriately , healthy apearing, pressure and fogginess feeling.   Mentation appears normal, affect normal/bright, judgement and insight intact, normal speech and appearance well-groomed.  HEENT: Head atraumatic/Normocephalic. Scalp normal. Pupils equally round, 4 mm, reactive to light and accommodation, sclera and conjunctiva normal. Fundoscopic examination reveals normal vessels no papilledema.   Neck: Easily moveable without resistance  Heart: S1/S2 appreciated, RRR, no m/r/g, no carotid bruits  Lungs:Lungs are clear to auscultation bilaterally, no wheezes or crackles.   Neurological Examination:  The patient is alert and oriented times four. Has good attention and concentration.  Speech is fluent without dysarthria.   Cranial nerves:  CN I deferred.   CN II: Intact and full visual fields to confrontation bilaterally. Funduscopic exam revealed disc bilaterally.   CN III, IV, VI: EOM intact. There is no nystagmus. Has conjugated gaze. Intact direct and consensual pupillary light reflexes.   CN V: Intact and symmetrical to facial sensation in the V1 through V3 bilaterally.   CN VII: Intact and symmetrical eyebrow and lid raise and eyelid closure, smiles and frown.   CN VIII: Intact to finger rub bilaterally.   CN IX and X: The palates elevates symmetrical. The uvula is midline.   CN XII: The tongue protrudes midline with no atrophy or fasciculations.   Motor exam: The patient has a normal bulk and tone throughout. There is no atrophy, fasciculations, clonus, or abnormal movements appreciated.   Strength Exam:  5/5 strength at shoulder abduction, elbow flexion or extension, wrist flexion or extension, finger abduction, , hip flexion and extension, knee flexion and extension, and dorsiflexion and plantarflexion bilaterally.   Sensation is intact to  light touch and pinprick throughout.   Reflexes are 2+ and symmetrical at biceps, triceps, brachioradialis, patellar, and Achilles.   Negative Babinski with downgoing toes bilaterally.   Coordination reveals finger-nose-finger with normal speed and accuracy.   Station and gait is normal. There is no ataxia. Can walk on the toes, heels, and tandem walk without difficulty.Has no drift and a negative Romberg. Lhermitte's negative        Assessment and Plan:  ED follow-up, headache with vertigo.  History of feeling lightheadedness, brain foggy feeling, vision blurriness and feeling like her head is expanding over the past week.  Acute onset of the symptoms.  Nonfocal neurological exam today which is reassuring.   ED work-up negative which adds reassurance as well.  Differentials migraine exacerbation, migrainous vertigo, headache due to sinus problems, infection, tumor, bleeding, thrombosis    plan:  Brain MRI with and without   Lab-BMP, ESR, CRP  Stay hydrated  Try naproxen 500 mg every 12 hours as needed for acute headache treatment   Stop Fioricet  Ophthalmology evaluation  Follow up video after the imaging to discuss treatment plan    I discussed all my recommendation with Mireya Yoon. The patient verbalizes understanding and comfortable with the plan. The patient has our clinic phone number to call with any questions or concerns. All of the patient's questions were answered from the best of my current knowledge.     Thank you for letting me be a part of the treatment team for Mireya Yoon  Time spent with pt answering questions, discussing findings, counseling and coordinating care was more than 50% the appointment time, 50 minutes.         SHANTELL Auguste, CNP  [facility]

## 2020-07-30 NOTE — DISCHARGE INSTRUCTIONS
MRI Contrast Discharge Instructions    The IV contrast you received today will pass out of your body in your  urine. This will happen in the next 24 hours. You will not feel this process.  Your urine will not change color.    Drink at least 4 extra glasses of water or juice today (unless your doctor  has restricted your fluids). This reduces the stress on your kidneys.  You may take your regular medicines.    If you are on dialysis: It is best to have dialysis today.    If you have a reaction: Most reactions happen right away. If you have  any new symptoms after leaving the hospital (such as hives or swelling),  call your hospital at the correct number below. Or call your family doctor.  If you have breathing distress or wheezing, call 911.    Special instructions: ***    I have read and understand the above information.    Signature:______________________________________ Date:___________    Staff:__________________________________________ Date:___________     Time:__________    [facility] Departments:    ___[place]: [phone]  ___[facility]: [phone]  ___[place]: [phone] ___[facility]: [phone]  ___[facility]: [phone]  ___[facility]: [phone]  ___[phone]  ___[facility]: [phone]  ___[phone]

## 2020-07-31 ENCOUNTER — OFFICE VISIT (OUTPATIENT)
Dept: OPHTHALMOLOGY | Facility: CLINIC | Age: 20
End: 2020-07-31
Attending: NURSE PRACTITIONER
Payer: COMMERCIAL

## 2020-07-31 DIAGNOSIS — R51.9 ACUTE INTRACTABLE HEADACHE, UNSPECIFIED HEADACHE TYPE: Primary | ICD-10-CM

## 2020-07-31 DIAGNOSIS — H52.13 MYOPIA OF BOTH EYES: ICD-10-CM

## 2020-07-31 ASSESSMENT — SLIT LAMP EXAM - LIDS
COMMENTS: NORMAL
COMMENTS: NORMAL

## 2020-07-31 ASSESSMENT — REFRACTION_CURRENTRX
OD_AXIS: 100
OS_DIAMETER: 14.0
OD_SPHERE: -2.25
OS_CYLINDER: SPHERE
OD_BRAND: COOPER BIOFINITY TORIC BC 8.7, D 14.5
OD_BASECURVE: 8.70
OS_BASECURVE: 8.60
OS_CYLINDER: SPHERE
OS_SPHERE: -2.75
OD_CYLINDER: -0.75
OD_BRAND: J&J ACUVUE OASYS FOR ASTIGMATISM BC 8.6, D 14.5
OD_DIAMETER: 14.5
OS_SPHERE: -3.50
OD_SPHERE: -2.50
OS_BRAND: COOPER BIOFINITY BC 8.6, D 14.0
OD_AXIS: 100
OD_CYLINDER: -0.75
OS_BRAND: J&J ACUVUE OASYS BC 8.4, D 14.0

## 2020-07-31 ASSESSMENT — REFRACTION_WEARINGRX
OS_AXIS: 135
OS_CYLINDER: +0.75
OS_SPHERE: -3.00
SPECS_TYPE: SVL
OD_SPHERE: -2.75
OD_CYLINDER: +0.50
OD_AXIS: 005

## 2020-07-31 ASSESSMENT — CONF VISUAL FIELD
OD_NORMAL: 1
METHOD: COUNTING FINGERS
OS_NORMAL: 1

## 2020-07-31 ASSESSMENT — VISUAL ACUITY
METHOD: SNELLEN - LINEAR
CORRECTION_TYPE: GLASSES
OS_CC+: -1
OS_CC: 20/15
OD_CC+: -2
OD_CC: 20/15

## 2020-07-31 ASSESSMENT — TONOMETRY
IOP_METHOD: ICARE
OD_IOP_MMHG: 17
OS_IOP_MMHG: 14

## 2020-07-31 ASSESSMENT — EXTERNAL EXAM - RIGHT EYE: OD_EXAM: NORMAL

## 2020-07-31 ASSESSMENT — CUP TO DISC RATIO
OS_RATIO: 0.2
OD_RATIO: 0.2

## 2020-07-31 ASSESSMENT — EXTERNAL EXAM - LEFT EYE: OS_EXAM: NORMAL

## 2020-07-31 NOTE — LETTER
July 31, 2020      Re: Mireya DUVALL Karrie   2000    To Whom It May Concern:    This is to confirm that the above patient was seen on 7/31/2020.      Thank you for your cooperation in this matter.  Please do not hesitate to contact me if you have any further questions.    Sincerely,      CLAUDIA RILEY T

## 2020-07-31 NOTE — NURSING NOTE
Chief Complaints and History of Present Illnesses   Patient presents with     Eye Exam For Headaches     Chief Complaint(s) and History of Present Illness(es)     Eye Exam For Headaches     Laterality: both eyes    Course: stable    Associated symptoms: headaches and blurred vision.  Negative for eye pain    Treatments tried: glasses    Pain scale: 0/10              Comments     Pt complains of having episode July 11th where vision BE became very blurry/fuzzy for about 15 minutes.  Complains of seeing a spinning light during that episode.  Since then she has had intermittent headaches & constantly having trouble focusing.  Ocular meds: none    Sayra Nye OT 8:05 AM July 31, 2020

## 2020-07-31 NOTE — PROGRESS NOTES
Assessment/Plan  (R51) Acute intractable headache, unspecified headache type  (primary encounter diagnosis)  Comment: Patient under care of neurology. Ocular health WNL today  Plan: Discussed normal exam findings with patient. Patient should consider trying FL-41 tinted lenses while working, as fluorescent lighting may be playing a role. No ocular or binocular vision abnormalities present today.     (H52.13) Myopia of both eyes  Plan: REFRACTION [62453]        See above. SRx and CL Rx updated and released.     Contact Lens Billing  V-Code:  - Soft spherical  Final Contact Lens Rx       Brand Base Curve Diameter Sphere Cylinder Axis    Right Janes Biofinity Toric BC 8.7, D 14.5 8.70 14.5 -2.00 -0.75 100    Left Janes Biofinity BC 8.6, D 14.0 8.60 14.0 -3.00 Sphere     Expiration Date:  8/1/2022    Replacement:  Monthly         Price per Unit: $75 fitting fee    These are for cosmetic contact lenses.    Encounter Diagnoses   Name Primary?     Acute intractable headache, unspecified headache type Yes     Myopia of both eyes         Complete documentation of historical and exam elements from today's encounter can  be found in the full encounter summary report (not reduplicated in this progress  note). I personally obtained the chief complaint(s) and history of present illness. I  confirmed and edited as necessary the review of systems, past medical/surgical  history, family history, social history, and examination findings as documented by  others; and I examined the patient myself. I personally reviewed the relevant tests,  images, and reports as documented above. I formulated and edited as necessary the  assessment and plan and discussed the findings and management plan with the  patient and family.    Cedric Gardner, OD

## 2020-08-04 ENCOUNTER — TELEPHONE (OUTPATIENT)
Dept: NEUROLOGY | Facility: CLINIC | Age: 20
End: 2020-08-04

## 2020-08-04 ENCOUNTER — VIRTUAL VISIT (OUTPATIENT)
Dept: NEUROLOGY | Facility: CLINIC | Age: 20
End: 2020-08-04
Payer: COMMERCIAL

## 2020-08-04 DIAGNOSIS — J01.90 ACUTE SINUSITIS WITH SYMPTOMS > 10 DAYS: Primary | ICD-10-CM

## 2020-08-04 RX ORDER — FLUTICASONE PROPIONATE 50 MCG
1 SPRAY, SUSPENSION (ML) NASAL DAILY
Qty: 11.1 ML | Refills: 0 | Status: SHIPPED | OUTPATIENT
Start: 2020-08-04 | End: 2020-10-19

## 2020-08-04 RX ORDER — AMOXICILLIN 500 MG/1
500 TABLET, FILM COATED ORAL EVERY 12 HOURS
Qty: 14 TABLET | Refills: 0 | Status: SHIPPED | OUTPATIENT
Start: 2020-08-04 | End: 2020-08-24

## 2020-08-04 NOTE — PATIENT INSTRUCTIONS
Plan:  Start amoxicillin 500 mg every 12 hours for 7 days as instructed. Side effects-rash or allergic reaction, anaphylactic reaction  Flonase trial one spray in each nostril.   Naproxen 500 mg every 12 hours as needed   Stay hydrated  Follow up in 7 days -next Tuesday or sooner if needed      Patient Education     Amoxicillin capsules or tablets  Brand Names: Amoxil, Moxilin, Sumox, Trimox  What is this medicine?  AMOXICILLIN (a mox i [place] in) is a penicillin antibiotic. It is used to treat certain kinds of bacterial infections. It will not work for colds, flu, or other viral infections.  How should I use this medicine?  Take this medicine by mouth with a glass of water. Follow the directions on your prescription label. You may take this medicine with food or on an empty stomach. Take your medicine at regular intervals. Do not take your medicine more often than directed. Take all of your medicine as directed even if you think your are better. Do not skip doses or stop your medicine early.  Talk to your pediatrician regarding the use of this medicine in children. While this drug may be prescribed for selected conditions, precautions do apply.  What side effects may I notice from receiving this medicine?  Side effects that you should report to your doctor or health care professional as soon as possible:    allergic reactions like skin rash, itching or hives, swelling of the face, lips, or tongue    breathing problems    dark urine    redness, blistering, peeling or loosening of the skin, including inside the mouth    seizures    severe or watery diarrhea    trouble passing urine or change in the amount of urine    unusual bleeding or bruising    unusually weak or tired    yellowing of the eyes or skin  Side effects that usually do not require medical attention (report to your doctor or health care professional if they continue or are bothersome):    dizziness    headache    stomach upset    trouble sleeping  What may  interact with this medicine?    amiloride    birth control pills    chloramphenicol    macrolides    probenecid    sulfonamides    tetracyclines    What if I miss a dose?  If you miss a dose, take it as soon as you can. If it is almost time for your next dose, take only that dose. Do not take double or extra doses.  Where should I keep my medicine?  Keep out of the reach of children.  Store between 68 and 77 degrees F (20 and 25 degrees C). Keep bottle closed tightly. Throw away any unused medicine after the expiration date.  What should I tell my health care provider before I take this medicine?  They need to know if you have any of these conditions:    asthma    kidney disease    an unusual or allergic reaction to amoxicillin, other penicillins, cephalosporin antibiotics, other medicines, foods, dyes, or preservatives    pregnant or trying to get pregnant    breast-feeding  What should I watch for while using this medicine?  Tell your doctor or health care professional if your symptoms do not improve in 2 or 3 days. Take all of the doses of your medicine as directed. Do not skip doses or stop your medicine early.  If you are diabetic, you may get a false positive result for sugar in your urine with certain brands of urine tests. Check with your doctor.  Do not treat diarrhea with over-the-counter products. Contact your doctor if you have diarrhea that lasts more than 2 days or if the diarrhea is severe and watery.  NOTE:This sheet is a summary. It may not cover all possible information. If you have questions about this medicine, talk to your doctor, pharmacist, or health care provider. Copyright  2019 [employer]           Patient Education     Fluticasone nasal spray  Brand Names: ClariSpray, Flonase, Flonase Allergy Relief, Flonase Sensimist, XHANCE  What is this medicine?  FLUTICASONE (floo TIK a sone) is a corticosteroid. This medicine is used to treat the symptoms of allergies like sneezing, itchy red eyes, and  itchy, runny, or stuffy nose. This medicine is also used to treat nasal polyps.  How should I use this medicine?  This medicine is for use in the nose. Follow the directions on your product or prescription label. This medicine works best if used at regular intervals. Do not use more often than directed. Make sure that you are using your nasal spray correctly. After 6 months of daily use for allergies, talk to your doctor or health care professional before using it for a longer time. Ask your doctor or health care professional if you have any questions.  Talk to your pediatrician regarding the use of this medicine in children. Special care may be needed. Some products have been used for allergies in children as young as 2 years. After 2 months of daily use without a prescription in a child, talk to your pediatrician before using it for a longer time. Use of this medicine for nasal polyps is not approved in children.  What side effects may I notice from receiving this medicine?  Side effects that you should report to your doctor or health care professional as soon as possible:    allergic reactions like skin rash, itching or hives, swelling of the face, lips, or tongue    changes in vision    crusting or sores in the nose    nosebleed    signs and symptoms of infection like fever or chills; cough; sore throat    white patches or sores in the mouth or nose  Side effects that usually do not require medical attention (report to your doctor or health care professional if they continue or are bothersome):    burning or irritation inside the nose or throat    cough    headache    unusual taste or smell  What may interact with this medicine?      certain antibiotics like clarithromycin and telithromycin    certain medicines for fungal infections like ketoconazole, itraconazole, and voriconazole    conivaptan    nefazodone    some medicines for HIV    vaccines  What if I miss a dose?  If you miss a dose, use it as soon as you  remember. If it is almost time for your next dose, use only that dose and continue with your regular schedule. Do not use double or extra doses.  Where should I keep my medicine?  Keep out of the reach of children.  Store at room temperature between 15 and 30 degrees C (59 and 86 degrees F). Avoid exposure to extreme heat, cold, or light. Throw away any unused medicine after the expiration date.  What should I tell my health care provider before I take this medicine?  They need to know if you have any of these conditions:    cataracts    glaucoma    infection, like tuberculosis, herpes, or fungal infection    recent surgery on nose or sinuses    taking a corticosteroid by mouth    an unusual or allergic reaction to fluticasone, steroids, other medicines, foods, dyes, or preservatives    pregnant or trying to get pregnant    breast-feeding  What should I watch for while using this medicine?  Visit your doctor or health care professional for regular checks on your progress. Some symptoms may improve within 12 hours after starting use. Check with your doctor or health care professional if there is no improvement in your symptoms after 3 weeks of use.  This medicine may increase your risk of getting an infection. Tell your doctor or health care professional if you are around anyone with measles or chickenpox, or if you develop sores or blisters that do not heal properly.  NOTE:This sheet is a summary. It may not cover all possible information. If you have questions about this medicine, talk to your doctor, pharmacist, or health care provider. Copyright  2019 [employer]

## 2020-08-04 NOTE — PROGRESS NOTES
Video-Visit Details    Type of service:  Video Visit    Video Start Time (time video started): 10:39 AM      Video End Time (time video stopped): 11:06    Originating Location (pt. Location): Home    Distant Location (provider location):  [facility] NEUROLOGY     Mode of Communication:  Video Conference via [facility]    Physician has received verbal consent for a Video Visit from the patient? Yes      Reason for visit:  To review brain MRI and results follow-up, headache follow-up    Interval history:  This is a 20-year-old female who presents to [facility] virtual clinic to follow-up on her brain results and to discuss her symptoms of head fullness and brain fog feeling, and dizziness  Initial [facility] headache clinic visit on 7/30/2020, see note for details  Patient has been seen at [facility] neurology clinic for headaches since middle school, unfortunately no records from [facility] available at this point.   Patient reports that continues feeling of fullness to the front, top and back on both sides. Patient reports that she has had a fullness feeling over sinuses and between ears. Patient reports that she has had sinus infection in the past -back in the middle school.   Patient reports that fullness and feeling of the brain fog are still there.   Pain is better.   Patient reports that when she blows her nose nothing is out but she feels stuffy.     Brain MRI reviewed  MR BRAIN W/O & W CONTRAST 7/30/2020 3:05 PM      Comparison: No prior similar studies      Technique:   Orbits: Axial and coronal T1-weighted, and coronal T2-weighted images  obtained without intravenous contrast. Post-intravenous contrast  (using gadolinium) sagittal FLAIR, and axial and coronal T1-weighted  images were obtained with fat-saturation, focused on the orbits.  Brain: Axial susceptibility-weighted and FLAIR sequences were obtained  of the whole brain without intravenous contrast, and postcontrast  axial T1-weighted images were obtained through  the whole brain.   Contrast: 7.5mL Gadavist     Findings:  Regarding the orbits, no definite mass is noted of the globe, conal  structures, or within the orbital fat on either side. The optic nerves  appear normal.     Regarding the remainder of the brain, the ventricles are proportionate  to the cerebral sulci. There is no mass effect or midline shift  intracranially. The major intracranial vascular flow-voids are patent.  Post-contrast images of the whole brain demonstrate no abnormal intra  or extra-axial contrast enhancement. Persistent cavum septum  pellucidum.     Mucosal thickening and frothy debris in the left maxillary sinus.  Mucosal thickening in the right maxillary sinus.                                                                      Impression:    1. Regarding the orbits and globes, there is no definite abnormal  contrast enhancement or mass.  2. Mucosal thickening and frothy debris in the left maxillary sinus.  This may represent acute sinusitis in the appropriate clinical  setting.     CHARLES CHEN MD    Sinus symptoms started less than 4 weeks ago but more than 10 days ago and initially started with a pressure in the face and brain fogginess but no fever.   Discussed sinusitis treatment with amoxicillin  and see if it helps with persistent pressure feeling and fogginess.       Results for DA SILVA NADIA L (MRN [id number]) as of 8/4/2020 10:51   Ref. Range 7/30/2020 15:17   Sodium Latest Ref Range: 133 - 144 mmol/L 138   Potassium Latest Ref Range: 3.4 - 5.3 mmol/L 3.7   Chloride Latest Ref Range: 94 - 109 mmol/L 110 (H)   Carbon Dioxide Latest Ref Range: 20 - 32 mmol/L 23   Urea Nitrogen Latest Ref Range: 7 - 30 mg/dL 10   Creatinine Latest Ref Range: 0.52 - 1.04 mg/dL 0.71   GFR Estimate Latest Ref Range: >60 mL/min/1.73_m2 >90   GFR Estimate If Black Latest Ref Range: >60 mL/min/1.73_m2 >90   Calcium Latest Ref Range: 8.5 - 10.1 mg/dL 9.1   Anion Gap Latest Ref Range: 3 - 14 mmol/L 6   CRP  Inflammation Latest Ref Range: 0.0 - 8.0 mg/L <2.9     PMH, allergies and current prescription medications reviewed    10 point ROS of systems including Constitutional, Eyes, Respiratory, Cardiovascular, Gastroenterology, Genitourinary, Integumentary, Muscularskeletal, Psychiatric were reviewed and no concerns reported today unless as mentioned in the HPI    Exam  GENERAL: Healthy, alert and no distress  EYES: Eyes grossly normal to inspection.  RESP: No audible wheeze, cough, or visible cyanosis.  No visible retractions or increased work of breathing.    SKIN: Visible skin clear.   NEURO: Cranial nerves grossly intact.   PSYCH: Mentation appears normal, affect normal/bright, judgement and insight intact, normal speech and appearance well-groomed.    A/P:  Persistent headache with feeling of head fullness, dizziness, some blurry vision fogginess this onset on 7/11/2020  Brain MRI results reviewed and no acute intracranial findings however findings clinical and radiological suggestive sinusitis.  Recommended treatment with amoxicillin and follow-up with primary care provider.  Patient was encouraged to establish primary care provider.  In meanwhile we will try amoxicillin and anticipated symptoms improvement.  Patient does not have allergy to amoxicillin.  Plan discussed with the patient:  Start amoxicillin 500 mg every 12 hours for 7 days as instructed. Side effects-rash or allergic reaction, anaphylactic reaction  Flonase trial one spray in each nostril.   Naproxen 500 mg every 12 hours as needed   Stay hydrated  Follow up in 7 days -next Tuesday or sooner if needed    I discussed all my recommendations with Rosalia who verbalizes understanding and comfortable with the plan.  All of patient's questions were answered from the best of my knowledge.  Patient is in agreement with the plan.     I spent a total of 25 minutes for telemedicine consult with the patient during today s virtual meeting. Over 50% of this time was  spent counseling the patient and/or coordinating care    SHANTELL Faulkner [facility]

## 2020-08-04 NOTE — Clinical Note
Can you please follow up with Mireya and see how she is doinf after course of amoxicillin   Thank you

## 2020-08-06 ENCOUNTER — OFFICE VISIT (OUTPATIENT)
Dept: FAMILY MEDICINE | Facility: CLINIC | Age: 20
End: 2020-08-06
Payer: COMMERCIAL

## 2020-08-06 VITALS
DIASTOLIC BLOOD PRESSURE: 80 MMHG | HEART RATE: 71 BPM | BODY MASS INDEX: 27.03 KG/M2 | OXYGEN SATURATION: 100 % | SYSTOLIC BLOOD PRESSURE: 122 MMHG | TEMPERATURE: 99.3 F | WEIGHT: 170 LBS

## 2020-08-06 DIAGNOSIS — J01.00 ACUTE NON-RECURRENT MAXILLARY SINUSITIS: ICD-10-CM

## 2020-08-06 DIAGNOSIS — N94.6 DYSMENORRHEA: ICD-10-CM

## 2020-08-06 DIAGNOSIS — N92.6 IRREGULAR MENSES: ICD-10-CM

## 2020-08-06 DIAGNOSIS — F41.9 ANXIETY: ICD-10-CM

## 2020-08-06 DIAGNOSIS — Z97.5 NEXPLANON IN PLACE: ICD-10-CM

## 2020-08-06 DIAGNOSIS — Z00.00 ROUTINE GENERAL MEDICAL EXAMINATION AT A HEALTH CARE FACILITY: Primary | ICD-10-CM

## 2020-08-06 PROBLEM — M54.50 ACUTE MIDLINE LOW BACK PAIN WITHOUT SCIATICA: Status: RESOLVED | Noted: 2020-05-26 | Resolved: 2020-08-06

## 2020-08-06 PROBLEM — M54.50 ACUTE LEFT-SIDED LOW BACK PAIN WITHOUT SCIATICA: Status: RESOLVED | Noted: 2020-05-26 | Resolved: 2020-08-06

## 2020-08-06 PROCEDURE — 99213 OFFICE O/P EST LOW 20 MIN: CPT | Mod: 25 | Performed by: FAMILY MEDICINE

## 2020-08-06 PROCEDURE — 99395 PREV VISIT EST AGE 18-39: CPT | Performed by: FAMILY MEDICINE

## 2020-08-06 NOTE — PROGRESS NOTES
SUBJECTIVE:   CC: Mireya Yoon is an 20 year old woman who presents for a preventive health visit and to follow-up on some baseline health conditions.     Healthy Habits:    Do you get at least three servings of calcium containing foods daily (dairy, green leafy vegetables, etc.)? yes    Amount of exercise or daily activities, outside of work: 4-5 day(s) per week    Problems taking medications regularly No    Medication side effects: No    Have you had an eye exam in the past two years? yes    Do you see a dentist twice per year? yes    Do you have sleep apnea, excessive snoring or daytime drowsiness?no    Other concerns:   Discuss birth control. Has the IMPLANON/NEXPLANON. Has cramps for 4 days and then her period will be lite and then normal for 5-7 days and then will be lite for 5-7 days. Last cycle started June 30th and ended July 14th.      She previously had Nexplanon and did well with that for a number of years.  She had a Nexplanon replaced in December 2018 and did well for a number of months, but in recent months she has had some irregular and prolonged and sometimes painful menses.  She had been on birth control pills prior to Nexplanon and is wondering if she should go back to those.  She has been dealing with some headaches recently and was found to have sinusitis.  She is now on medication for that.  She does see an outside mental health nurse practitioner at [facility] for anxiety.  She is on sertraline for that.    She graduated from [employer] this spring with a degree in psychology.  She is looking for work in that area.    Today's PHQ-2 Score:   PHQ-2 ( 1999 [employer]) 8/6/2020 7/31/2020   Q1: Little interest or pleasure in doing things 0 0   Q2: Feeling down, depressed or hopeless 0 0   PHQ-2 Score 0 0   Q1: Little interest or pleasure in doing things - -   Q2: Feeling down, depressed or hopeless - -   PHQ-2 Score - -       Abuse: Current or Past(Physical, Sexual or Emotional)- No  Do you  feel safe in your environment? Yes        Social History     Tobacco Use     Smoking status: Never Smoker     Smokeless tobacco: Never Used   Substance Use Topics     Alcohol use: No     Alcohol/week: 0.0 standard drinks     Comment: seldom/rare     If you drink alcohol do you typically have >3 drinks per day or >7 drinks per week? No                     Reviewed orders with patient.  Reviewed health maintenance and updated orders accordingly - Yes  Patient Active Problem List   Diagnosis     Iron deficiency anemia     Dysmenorrhea     Nexplanon in place     Anxiety     History reviewed. No pertinent surgical history.    Social History     Tobacco Use     Smoking status: Never Smoker     Smokeless tobacco: Never Used   Substance Use Topics     Alcohol use: No     Alcohol/week: 0.0 standard drinks     Comment: seldom/rare     Family History   Problem Relation Age of Onset     Hypertension Mother      Gallbladder Disease Mother      Unknown/Adopted Father      Diabetes Maternal Grandmother      Glaucoma No family hx of      Macular Degeneration No family hx of          Current Outpatient Medications   Medication Sig Dispense Refill     amoxicillin (AMOXIL) 500 MG tablet Take 1 tablet (500 mg) by mouth every 12 hours 14 tablet 0     fluticasone (FLONASE) 50 MCG/ACT nasal spray Spray 1 spray into both nostrils daily 11.1 mL 0     naproxen (NAPROSYN) 500 MG tablet Take 1 tablet (500 mg) by mouth every 12 hours as needed for headaches With food 30 tablet 3     sertraline (ZOLOFT) 50 MG tablet Take 1 tablet (50 mg) by mouth daily 60 tablet 1     Allergies   Allergen Reactions     Seasonal Allergies        Mammogram not appropriate for this patient based on age.    Pertinent mammograms are reviewed under the imaging tab.  History of abnormal Pap smear: NO - age 21-29 PAP every 3 years recommended  PAP / HPV 12/30/2019   PAP NIL     Reviewed and updated as needed this visit by clinical staff  Tobacco  Allergies  Meds  Med  Hx  Surg Hx  Fam Hx  Soc Hx        Reviewed and updated as needed this visit by Provider            ROS:  CONSTITUTIONAL: NEGATIVE for fever, chills, change in weight  INTEGUMENTARU/SKIN: NEGATIVE for worrisome rashes, moles or lesions  EYES: NEGATIVE for vision changes or irritation  ENT: NEGATIVE for ear, mouth and throat problems  RESP: NEGATIVE for significant cough or SOB  BREAST: NEGATIVE for masses, tenderness or discharge  CV: NEGATIVE for chest pain, palpitations or peripheral edema  GI: NEGATIVE for nausea, abdominal pain, heartburn, or change in bowel habits   female: See above.  She has no concerns for sexually transmitted infections and did have an HIV test at school last fall in October.  MUSCULOSKELETAL: History of back and leg pain  NEURO: See above as well as chart for details on recent headaches  PSYCHIATRIC: See above    OBJECTIVE:   /80 (BP Location: Right arm, Patient Position: Sitting, Cuff Size: Adult Regular)   Pulse 71   Temp 99.3  F (37.4  C) (Oral)   Wt 77.1 kg (170 lb)   SpO2 100%   BMI 27.03 kg/m    EXAM:  GENERAL: healthy, alert and no distress  EYES: Eyes grossly normal to inspection, PERRL and conjunctivae and sclerae normal  HENT: Grossly normal  NECK: no adenopathy, no asymmetry, masses, or scars and thyroid normal to palpation  RESP: lungs clear to auscultation - no rales, rhonchi or wheezes  CV: regular rate and rhythm, normal S1 S2, no S3 or S4, no murmur, click or rub, no peripheral edema and peripheral pulses strong  ABDOMEN: soft, nontender, no hepatosplenomegaly, no masses and bowel sounds normal  MS: no gross musculoskeletal defects noted, no edema  SKIN: no suspicious lesions or rashes  NEURO: Normal strength and tone, mentation intact and speech normal  PSYCH: mentation appears normal, affect normal/bright    Diagnostic Test Results:  Labs reviewed in Epic    ASSESSMENT/PLAN:       ICD-10-CM    1. Routine general medical examination at a [facility]  "facility  Z00.00    2. Acute non-recurrent maxillary sinusitis  J01.00    3. Nexplanon in place  Z97.5    4. Irregular menses  N92.6    5. Dysmenorrhea  N94.6    6. Anxiety  F41.9      Blood pressure and other vital signs look fine  She was advised to finish on her treatment of amoxicillin and fluticasone nasal spray for her sinusitis  Discussed pros and cons of leaving the Nexplanon in place versus having it removed and going back to OCPs  She will give this a while longer and then potentially follow-up with OB/Gyn if she wants to have it removed  She will continue sertraline and ongoing care for her mental health with her current provider  A normal Pap smear last year and normal recent labs, so we will hold off on those items today  Plan a recheck in 1 year, or sooner prn    COUNSELING:   Reviewed preventive health counseling, as reflected in patient instructions       Regular exercise       Healthy diet/nutrition    Estimated body mass index is 27.03 kg/m  as calculated from the following:    Height as of 5/21/20: 1.689 m (5' 6.5\").    Weight as of this encounter: 77.1 kg (170 lb).    Weight management plan: Discussed healthy diet and exercise guidelines     reports that she has never smoked. She has never used smokeless tobacco.      Counseling Resources:  ATP IV Guidelines  Pooled Cohorts Equation Calculator  Breast Cancer Risk Calculator  FRAX Risk Assessment  ICSI Preventive Guidelines  Dietary Guidelines for Americans, 2010  USDA's MyPlate  ASA Prophylaxis  Lung CA Screening    Oniel Silva MD  [facility]  "

## 2020-08-17 ENCOUNTER — TELEPHONE (OUTPATIENT)
Dept: NEUROLOGY | Facility: CLINIC | Age: 20
End: 2020-08-17

## 2020-08-17 NOTE — TELEPHONE ENCOUNTER
I called pt to discuss how she is doing after her amoxicillin course. Mireya says she no longer has pain in her head. She is still having some feeling of fullness at times in her head. Overall she is feeling better. I will update provider Naomi STINSON, CNP.     Maday PALOMO

## 2020-08-19 ENCOUNTER — TELEPHONE (OUTPATIENT)
Dept: NEUROLOGY | Facility: CLINIC | Age: 20
End: 2020-08-19

## 2020-08-19 DIAGNOSIS — J01.90 ACUTE SINUSITIS WITH SYMPTOMS > 10 DAYS: Primary | ICD-10-CM

## 2020-08-19 DIAGNOSIS — R51.9 ACUTE INTRACTABLE HEADACHE, UNSPECIFIED HEADACHE TYPE: ICD-10-CM

## 2020-08-19 NOTE — TELEPHONE ENCOUNTER
[facility] Call Center    Phone Message    May a detailed message be left on voicemail: yes     Reason for Call: Symptoms or Concerns     If patient has red-flag symptoms, warm transfer to triage line    Current symptom or concern: Pt is calling stating that her symptoms are back she is having fullness and pressure in her head. Would like further instruction from Naomi on next steps to possibly see an ENT     Please advise      Symptoms have been present for:  1 week(s)    Has patient previously been seen for this? Yes    By Naomi Matute:     Date: 08/06    Are there any new or worsening symptoms? Yes: Symptoms came back after finishing antibiotics       Action Taken: Other:  NEUROLOGY     Travel Screening: Not Applicable

## 2020-08-19 NOTE — TELEPHONE ENCOUNTER
I called Mireya and informed her that Naomi has ordered a CT to check her sinuses and if she gets worse over the weekend she should go to the ED.  She verbalized understanding and agreement.    Message sent to our schedulers asking to try and get a CT done before her appointment on Monday with Naomi

## 2020-08-21 ENCOUNTER — ANCILLARY PROCEDURE (OUTPATIENT)
Dept: CT IMAGING | Facility: CLINIC | Age: 20
End: 2020-08-21
Attending: NURSE PRACTITIONER
Payer: COMMERCIAL

## 2020-08-21 DIAGNOSIS — R51.9 ACUTE INTRACTABLE HEADACHE, UNSPECIFIED HEADACHE TYPE: ICD-10-CM

## 2020-08-21 DIAGNOSIS — J01.90 ACUTE SINUSITIS WITH SYMPTOMS > 10 DAYS: ICD-10-CM

## 2020-08-21 PROCEDURE — 70450 CT HEAD/BRAIN W/O DYE: CPT | Mod: TC

## 2020-08-24 ENCOUNTER — VIRTUAL VISIT (OUTPATIENT)
Dept: NEUROLOGY | Facility: CLINIC | Age: 20
End: 2020-08-24
Payer: COMMERCIAL

## 2020-08-24 ENCOUNTER — DOCUMENTATION ONLY (OUTPATIENT)
Dept: NEUROLOGY | Facility: CLINIC | Age: 20
End: 2020-08-24

## 2020-08-24 DIAGNOSIS — R51.9 ACUTE INTRACTABLE HEADACHE, UNSPECIFIED HEADACHE TYPE: Primary | ICD-10-CM

## 2020-08-24 RX ORDER — METHYLPREDNISOLONE 4 MG
TABLET, DOSE PACK ORAL
Qty: 21 TABLET | Refills: 0 | Status: SHIPPED | OUTPATIENT
Start: 2020-08-24 | End: 2020-10-19

## 2020-08-24 RX ORDER — BIOTIN 10 MG
2 TABLET ORAL DAILY
COMMUNITY
End: 2021-07-07

## 2020-08-24 RX ORDER — TOPIRAMATE 25 MG/1
TABLET, FILM COATED ORAL
Qty: 90 TABLET | Refills: 3 | Status: SHIPPED | OUTPATIENT
Start: 2020-08-24 | End: 2020-10-19 | Stop reason: SINTOL

## 2020-08-24 NOTE — LETTER
"8/24/2020       RE: Mireya Yoon  [address]  [place] [address]     Dear Colleague,    Thank you for referring your patient, Mireya Yoon, to the [facility] NEUROLOGY at [facility]. Please see a copy of my visit note below.    Mireya Yoon is a 20 year old female who is being evaluated via a billable video visit.      The patient has been notified of following:     \"This video visit will be conducted via a call between you and your physician/provider. We have found that certain health care needs can be provided without the need for an in-person physical exam.  This service lets us provide the care you need with a video conversation.  If a prescription is necessary we can send it directly to your pharmacy.  If lab work is needed we can place an order for that and you can then stop by our lab to have the test done at a later time.  s  Video visits are billed at different rates depending on your insurance coverage.  Please reach out to your insurance provider with any questions.    If during the course of the call the physician/provider feels a video visit is not appropriate, you will not be charged for this service.\"    Patient has given verbal consent for Video visit? Yes  How would you like to obtain your AVS? [employer]  Patient would like invite sent via: Text to cell phone: [phone]  Will anyone else be joining your video visit? No        Telephone Visit Details    Type of service:  Video Visit     Start Time: 11:51 AM  End Time: 12:10 PM    Originating Location (pt. Location): Home    Distant Location (provider location):  [facility] NEUROLOGY     Platform used for Video Visit:     Interval history:  This is a 20-year-old female who presents to [facility] virtual clinic to follow-up on her brain results and to discuss her symptoms of head fullness and brain fog feeling, and dizziness  Initial [facility] headache clinic visit on 7/30/2020, see note for details  As part of " "patient's headache evaluation she underwent brain MRI which was reviewed.  Brain MRI findings negative for any intracranial causes of patient's symptoms however it is really revealed an  acute sinusitis and patient was started amoxicillin and Flonase for sinusitis treatment.   Patient reports that the pressure in her sinus and ears reoccurred after amoxicillin stopped.  Patient has been seen by her primary care team for sinusitis management.   CT sinuses ordered and reviewd and sinusitis improved.   Patient reports that she completed amoxicillin. Reports that a few days after she stopped amoxicillin pressure in the sinuses came back. still has been feeling pressure in the frontal area and has been worse with head movements and reports that she had to call in to work.  Patient's symptoms possibly underlined migraine headache  Patient reports that she sleeps well and does not wake up with the pain but pressure in the ears.   Intractable frontal pressure and history of migraine headaches and seen at [facility] sometime ago.  Patient reports that movements makes the pressure feeling worse. No nausea or vomiting. Patient reports that pressure is all over head.   Patient reports that she has been drinking water and appetite normal.   Denies fever or chills, no night sweats.   Patient reports that she can smell things but sometimes nostrills \"back up.\"  No new symptoms and head pressure has been stable for over a month.  Discussed migraine symptoms treatment.   Discussed a trial of topiramate for migraine headache prevention and will start with Medrol to help with breaking headache/symptoms cycle.  Discussed topiramate and Medrol pack side effects and patient agreed to the treatment.    Treatment plan discussed with the patient:  A trial of Medrol pack -side effects-GI bleed and ulcer, anxiety, insomnia  A trial of topiramate 25 mg at bedtime for one week, then take 50 mg at bedtime for one week, then take " 75 mg at bedtime. Side effects-paresthesia, nausea, decreased appetite, aversion of taste to soda/carbonation, renal stones, glaucoma, hyperthermia, depression, mood changes. 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               Excuse from work for a day to start new treatment   Naproxen as needed for mild to moderate pain  Follow up in 4-6 weeks in person preferably or PDR or sooner if needed    PMH, allergies and current prescription medications reviewed    10 point ROS of systems including Constitutional, Eyes, Respiratory, Cardiovascular, Gastroenterology, Genitourinary, Integumentary, Muscularskeletal, Psychiatric were reviewed and no concerns reported today unless as mentioned in the HPI    Patient is alert and no in apparent acute distress, healthy-appearing, mentation appears normal, judgement and insight intact, normal speech.      A/P:  As per above discussion    I discussed all my recommendations with Mireya Yoon who verbalizes understanding and comfortable with the plan.  All of patient's questions were answered from the best of my knowledge.  Patient is in agreement with the plan.     I spent a total of 19 minutes for telemedicine consult with the patient during today s virtual meeting. Over 50% of this time was spent counseling the patient and/or coordinating care    SHANTELL Faulkner CNP  [facility]               Again, thank you for allowing me to participate in the care of your patient.      Sincerely,    SHANTELL Shukla CNP

## 2020-08-24 NOTE — LETTER
August 24, 2020      Mireya Yoon  [address]  [place] [address]        To Whom It May Concern:    Mireya Yoon  was seen on 8/24/2020.  Please excuse her  Until 8/26 due to illness and starting new medications.        Sincerely,        SHANTELL Fontenot CNP  [facility]

## 2020-08-24 NOTE — LETTER
"8/24/2020       RE: Mireya Yoon  [address]  [place] [address]     Dear Colleague,    Thank you for referring your patient, Mireya Yoon, to the [facility] NEUROLOG[facility] at [facility]. Please see a copy of my visit note below.    Interval history:  This is a 20-year-old female who presents to [facility] virtual clinic to follow-up on her brain results and to discuss her symptoms of head fullness and brain fog feeling, and dizziness  Initial [facility] headache clinic visit on 7/30/2020, see note for details  As part of patient's headache evaluation she underwent brain MRI which was reviewed.  Brain MRI findings negative for any intracranial causes of patient's symptoms however it is really revealed an  acute sinusitis and patient was started amoxicillin and Flonase for sinusitis treatment.   Patient reports that the pressure in her sinus and ears reoccurred after amoxicillin stopped.  Patient has been seen by her primary care team for sinusitis management.   CT sinuses ordered and reviewd and sinusitis improved.   Patient reports that she completed amoxicillin. Reports that a few days after she stopped amoxicillin pressure in the sinuses came back. still has been feeling pressure in the frontal area and has been worse with head movements and reports that she had to call in to work.  Patient's symptoms possibly underlined migraine headache  Patient reports that she sleeps well and does not wake up with the pain but pressure in the ears.   Intractable frontal pressure and history of migraine headaches and seen at [facility] sometime ago.  Patient reports that movements makes the pressure feeling worse. No nausea or vomiting. Patient reports that pressure is all over head.   Patient reports that she has been drinking water and appetite normal.   Denies fever or chills, no night sweats.   Patient reports that she can smell things but sometimes nostrills \"back up.\"  No new " symptoms and head pressure has been stable for over a month.  Discussed migraine symptoms treatment.   Discussed a trial of topiramate for migraine headache prevention and will start with Medrol to help with breaking headache/symptoms cycle.  Discussed topiramate and Medrol pack side effects and patient agreed to the treatment.    Treatment plan discussed with the patient:  A trial of Medrol pack -side effects-GI bleed and ulcer, anxiety, insomnia  A trial of topiramate 25 mg at bedtime for one week, then take 50 mg at bedtime for one week, then take 75 mg at bedtime. Side effects-paresthesia, nausea, decreased appetite, aversion of taste to soda/carbonation, renal stones, glaucoma, hyperthermia, depression, mood changes. 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               Excuse from work for a day to start new treatment   Naproxen as needed for mild to moderate pain  Follow up in 4-6 weeks in person preferably or PDR or sooner if needed    PMH, allergies and current prescription medications reviewed    10 point ROS of systems including Constitutional, Eyes, Respiratory, Cardiovascular, Gastroenterology, Genitourinary, Integumentary, Muscularskeletal, Psychiatric were reviewed and no concerns reported today unless as mentioned in the HPI    Patient is alert and no in apparent acute distress, healthy-appearing, mentation appears normal, judgement and insight intact, normal speech.    A/P:  As per above discussion    I discussed all my recommendations with Mireya Yoon who verbalizes understanding and comfortable with the plan.  All of patient's questions were answered from the best of my knowledge.  Patient is in agreement with the plan.     I spent a total of 19 minutes for telemedicine consult with the patient during today s virtual meeting. Over 50% of this time was spent counseling the patient and/or coordinating care      Again, thank you for allowing me to participate in the care of your  patient.  Sincerely,    SHANTELL Faulkner [facility]

## 2020-08-24 NOTE — PATIENT INSTRUCTIONS
Plan:  A trial of Medrol pack -side effects-GI bleed and ulcer, anxiety, insomnia  A trial of topiramate 25 mg at bedtime for one week, then take 50 mg at bedtime . 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        Excuse from work for a day to start new treatment   Naproxen as needed for mild to moderate pain  Follow up in 4-6 weeks or sooner if needed                  Patient Education     Topiramate tablets  Brand Names: Topamax, Topiragen  What is this medicine?  TOPIRAMATE (toe PYRE a mate) is used to treat seizures in adults or children with epilepsy. It is also used for the prevention of migraine headaches.  How should I use this medicine?  Take this medicine by mouth with a glass of water. Follow the directions on the prescription label. Do not crush or chew. You may take this medicine with meals. Take your medicine at regular intervals. Do not take it more often than directed.  Talk to your pediatrician regarding the use of this medicine in children. Special care may be needed. While this drug may be prescribed for children as young as 2 years of age for selected conditions, precautions do apply.  What side effects may I notice from receiving this medicine?  Side effects that you should report to your doctor or health care professional as soon as possible:    allergic reactions like skin rash, itching or hives, swelling of the face, lips, or tongue    decreased sweating and/or rise in body temperature    depression    difficulty breathing, fast or irregular breathing patterns    difficulty speaking    difficulty walking or controlling muscle movements    hearing impairment    redness, blistering, peeling or loosening of the skin, including inside the mouth    tingling, pain or numbness in the hands or feet    unusual bleeding or  bruising    unusually weak or tired    worsening of mood, thoughts or actions of suicide or dying  Side effects that usually do not require medical attention (report to your doctor or health care professional if they continue or are bothersome):    altered taste    back pain, joint or muscle aches and pains    diarrhea, or constipation    headache    loss of appetite    nausea    stomach upset, indigestion    tremors  What may interact with this medicine?  Do not take this medicine with any of the following medications:    probenecid  This medicine may also interact with the following medications:    acetazolamide    alcohol    amitriptyline    aspirin and aspirin-like medicines    birth control pills    certain medicines for depression    certain medicines for seizures    certain medicines that treat or prevent blood clots like warfarin, enoxaparin, dalteparin, apixaban, dabigatran, and rivaroxaban    digoxin    hydrochlorothiazide    lithium    medicines for pain, sleep, or muscle relaxation    metformin    methazolamide    NSAIDS, medicines for pain and inflammation, like ibuprofen or naproxen    pioglitazone    risperidone  What if I miss a dose?  If you miss a dose, take it as soon as you can. If your next dose is to be taken in less than 6 hours, then do not take the missed dose. Take the next dose at your regular time. Do not take double or extra doses.  Where should I keep my medicine?  Keep out of the reach of children.  Store at room temperature between 15 and 30 degrees C (59 and 86 degrees F) in a tightly closed container. Protect from moisture. Throw away any unused medicine after the expiration date.  What should I tell my health care provider before I take this medicine?  They need to know if you have any of these conditions:    bleeding disorders    cirrhosis of the liver or liver disease    diarrhea    glaucoma    kidney stones or kidney disease    low blood counts, like low white cell, platelet, or  red cell counts    lung disease like asthma, obstructive pulmonary disease, emphysema    metabolic acidosis    on a ketogenic diet    schedule for surgery or a procedure    suicidal thoughts, plans, or attempt; a previous suicide attempt by you or a family member    an unusual or allergic reaction to topiramate, other medicines, foods, dyes, or preservatives    pregnant or trying to get pregnant    breast-feeding  What should I watch for while using this medicine?  Visit your doctor or health care professional for regular checks on your progress. Do not stop taking this medicine suddenly. This increases the risk of seizures if you are using this medicine to control epilepsy. Wear a medical identification bracelet or chain to say you have epilepsy or seizures, and carry a card that lists all your medicines.  This medicine can decrease sweating and increase your body temperature. Watch for signs of  sweating or fever, especially in children. Avoid extreme heat, hot baths, and saunas. Be careful about exercising, especially in hot weather. Contact your health care provider right away if you notice a fever or decrease in sweating.  You should drink plenty of fluids while taking this medicine. If you have had kidney stones in the past, this will help to reduce your chances of forming kidney stones.  If you have stomach pain, with nausea or vomiting and yellowing of your eyes or skin, call your doctor immediately.  You may get drowsy, dizzy, or have blurred vision. Do not drive, use machinery, or do anything that needs mental alertness until you know how this medicine affects you. To reduce dizziness, do not sit or stand up quickly, especially if you are an older patient. Alcohol can increase drowsiness and dizziness. Avoid alcoholic drinks.  If you notice blurred vision, eye pain, or other eye problems, seek medical attention at once for an eye exam.  The use of this medicine may increase the chance of suicidal  thoughts or actions. Pay special attention to how you are responding while on this medicine. Any worsening of mood, or thoughts of suicide or dying should be reported to your health care professional right away.  This medicine may increase the chance of developing metabolic acidosis. If left untreated, this can cause kidney stones, bone disease, or slowed growth in children. Symptoms include breathing fast, fatigue, loss of appetite, irregular heartbeat, or loss of consciousness. Call your doctor immediately if you experience any of these side effects. Also, tell your doctor about any surgery you plan on having while taking this medicine since this may increase your risk for metabolic acidosis.  Birth control pills may not work properly while you are taking this medicine. Talk to your doctor about using an extra method of birth control.  Women who become pregnant while using this medicine may enroll in the [place] Antiepileptic Drug Pregnancy Registry by calling 1-281.676.1446. This registry collects information about the safety of antiepileptic drug use during pregnancy.  NOTE:This sheet is a summary. It may not cover all possible information. If you have questions about this medicine, talk to your doctor, pharmacist, or health care provider. Copyright  2019 [employer]           Patient Education     Methylprednisolone tablets  Brand Names: Medrol, Medrol Dosepak  What is this medicine?  METHYLPREDNISOLONE (meth ill pred NISS oh lone) is a corticosteroid. It is commonly used to treat inflammation of the skin, joints, lungs, and other organs. Common conditions treated include asthma, allergies, and arthritis. It is also used for other conditions, such as blood disorders and diseases of the adrenal glands.  How should I use this medicine?  Take this medicine by mouth with a glass of water. Follow the directions on the prescription label. Take this medicine with food. If you are taking this medicine once a day,  take it in the morning. Do not take it more often than directed. Do not suddenly stop taking your medicine because you may develop a severe reaction. Your doctor will tell you how much medicine to take. If your doctor wants you to stop the medicine, the dose may be slowly lowered over time to avoid any side effects.  Talk to your pediatrician regarding the use of this medicine in children. Special care may be needed.  What side effects may I notice from receiving this medicine?  Side effects that you should report to your doctor or health care professional as soon as possible:    allergic reactions like skin rash, itching or hives, swelling of the face, lips, or tongue    bloody or tarry stools    changes in vision    hallucination, loss of contact with reality    muscle cramps    muscle pain    palpitations    signs and symptoms of high blood sugar such as dizziness; dry mouth; dry skin; fruity breath; nausea; stomach pain; increased hunger or thirst; increased urination    signs and symptoms of infection like fever or chills; cough; sore throat; pain or trouble passing urine    trouble passing urine or change in the amount of urine  Side effects that usually do not require medical attention (report to your doctor or health care professional if they continue or are bothersome):    changes in emotions or mood    constipation    diarrhea    excessive hair growth on the face or body    headache    nausea, vomiting    trouble sleeping    weight gain  What may interact with this medicine?  Do not take this medicine with any of the following medications:    alefacept    echinacea    live virus vaccines    metyrapone    mifepristone  This medicine may also interact with the following medications:    amphotericin B    aspirin and aspirin-like medicines    certain antibiotics like erythromycin, clarithromycin, troleandomycin    certain medicines for diabetes    certain medicines for fungal infections like  ketoconazole    certain medicines for seizures like carbamazepine, phenobarbital, phenytoin    certain medicines that treat or prevent blood clots like warfarin    cholestyramine    cyclosporine    digoxin    diuretics    female hormones, like estrogens and birth control pills    isoniazid    NSAIDs, medicines for pain inflammation, like ibuprofen or naproxen    other medicines for myasthenia gravis    rifampin    vaccines  What if I miss a dose?  If you miss a dose, take it as soon as you can. If it is almost time for your next dose, talk to your doctor or health care professional. You may need to miss a dose or take an extra dose. Do not take double or extra doses without advice.  Where should I keep my medicine?  Keep out of the reach of children.  Store at room temperature between 20 and 25 degrees C (68 and 77 degrees F). Throw away any unused medicine after the expiration date.  What should I tell my health care provider before I take this medicine?  They need to know if you have any of these conditions:    Cushing's syndrome    eye disease, vision problems    diabetes    glaucoma    heart disease    high blood pressure    infection (especially a virus infection such as chickenpox, cold sores, or herpes)    liver disease    mental illness    myasthenia gravis    osteoporosis    recently received or scheduled to receive a vaccine    seizures    stomach or intestine problems    thyroid disease    an unusual or allergic reaction to lactose, methylprednisolone, other medicines, foods, dyes, or preservatives    pregnant or trying to get pregnant    breast-feeding  What should I watch for while using this medicine?  Tell your doctor or healthcare professional if your symptoms do not start to get better or if they get worse. Do not stop taking except on your doctor's advice. You may develop a severe reaction. Your doctor will tell you how much medicine to take.  This medicine may increase your risk of getting an  infection. Tell your doctor or health care professional if you are around anyone with measles or chickenpox, or if you develop sores or blisters that do not heal properly.  This medicine may affect blood sugar levels. If you have diabetes, check with your doctor or health care professional before you change your diet or the dose of your diabetic medicine.  Tell your doctor or health care professional right away if you have any change in your eyesight.  Using this medicine for a long time may increase your risk of low bone mass. Talk to your doctor about bone health.  NOTE:This sheet is a summary. It may not cover all possible information. If you have questions about this medicine, talk to your doctor, pharmacist, or health care provider. Copyright  2019 [employer]

## 2020-08-24 NOTE — PROGRESS NOTES
"Mireya Yoon is a 20 year old female who is being evaluated via a billable video visit.      The patient has been notified of following:     \"This video visit will be conducted via a call between you and your physician/provider. We have found that certain health care needs can be provided without the need for an in-person physical exam.  This service lets us provide the care you need with a video conversation.  If a prescription is necessary we can send it directly to your pharmacy.  If lab work is needed we can place an order for that and you can then stop by our lab to have the test done at a later time.  s  Video visits are billed at different rates depending on your insurance coverage.  Please reach out to your insurance provider with any questions.    If during the course of the call the physician/provider feels a video visit is not appropriate, you will not be charged for this service.\"    Patient has given verbal consent for Video visit? Yes  How would you like to obtain your AVS? Nahed  Patient would like invite sent via: Text to cell phone: [phone]  Will anyone else be joining your video visit? No        Telephone Visit Details    Type of service:  Video Visit     Start Time: 11:51 AM  End Time: 12:10 PM    Originating Location (pt. Location): Home    Distant Location (provider location):  [facility] NEUROLOGY     Platform used for Video Visit:     Interval history:  This is a 20-year-old female who presents to [facility] virtual clinic to follow-up on her brain results and to discuss her symptoms of head fullness and brain fog feeling, and dizziness  Initial [facility] headache clinic visit on 7/30/2020, see note for details  As part of patient's headache evaluation she underwent brain MRI which was reviewed.  Brain MRI findings negative for any intracranial causes of patient's symptoms however it is really revealed an  acute sinusitis and patient was started amoxicillin and Flonase for sinusitis " "treatment.   Patient reports that the pressure in her sinus and ears reoccurred after amoxicillin stopped.  Patient has been seen by her primary care team for sinusitis management.   CT sinuses ordered and reviewd and sinusitis improved.   Patient reports that she completed amoxicillin. Reports that a few days after she stopped amoxicillin pressure in the sinuses came back. still has been feeling pressure in the frontal area and has been worse with head movements and reports that she had to call in to work.  Patient's symptoms possibly underlined migraine headache  Patient reports that she sleeps well and does not wake up with the pain but pressure in the ears.   Intractable frontal pressure and history of migraine headaches and seen at [facility] sometime ago.  Patient reports that movements makes the pressure feeling worse. No nausea or vomiting. Patient reports that pressure is all over head.   Patient reports that she has been drinking water and appetite normal.   Denies fever or chills, no night sweats.   Patient reports that she can smell things but sometimes nostrills \"back up.\"  No new symptoms and head pressure has been stable for over a month.  Discussed migraine symptoms treatment.   Discussed a trial of topiramate for migraine headache prevention and will start with Medrol to help with breaking headache/symptoms cycle.  Discussed topiramate and Medrol pack side effects and patient agreed to the treatment.    Treatment plan discussed with the patient:  A trial of Medrol pack -side effects-GI bleed and ulcer, anxiety, insomnia  A trial of topiramate 25 mg at bedtime for one week, then take 50 mg at bedtime for one week, then take 75 mg at bedtime. Side effects-paresthesia, nausea, decreased appetite, aversion of taste to soda/carbonation, renal stones, glaucoma, hyperthermia, depression, mood changes. 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               Excuse from work " for a day to start new treatment   Naproxen as needed for mild to moderate pain  Follow up in 4-6 weeks in person preferably or PDR or sooner if needed    PMH, allergies and current prescription medications reviewed    10 point ROS of systems including Constitutional, Eyes, Respiratory, Cardiovascular, Gastroenterology, Genitourinary, Integumentary, Muscularskeletal, Psychiatric were reviewed and no concerns reported today unless as mentioned in the HPI    Patient is alert and no in apparent acute distress, healthy-appearing, mentation appears normal, judgement and insight intact, normal speech.      A/P:  As per above discussion    I discussed all my recommendations with Mireya Yoon who verbalizes understanding and comfortable with the plan.  All of patient's questions were answered from the best of my knowledge.  Patient is in agreement with the plan.     I spent a total of 19 minutes for telemedicine consult with the patient during today s virtual meeting. Over 50% of this time was spent counseling the patient and/or coordinating care    SHANTELL Faulkner [facility]

## 2020-08-26 ENCOUNTER — OFFICE VISIT (OUTPATIENT)
Dept: FAMILY MEDICINE | Facility: CLINIC | Age: 20
End: 2020-08-26
Payer: COMMERCIAL

## 2020-08-26 VITALS
DIASTOLIC BLOOD PRESSURE: 73 MMHG | WEIGHT: 165 LBS | TEMPERATURE: 98.4 F | HEART RATE: 73 BPM | BODY MASS INDEX: 26.23 KG/M2 | OXYGEN SATURATION: 100 % | SYSTOLIC BLOOD PRESSURE: 116 MMHG

## 2020-08-26 DIAGNOSIS — J01.10 ACUTE NON-RECURRENT FRONTAL SINUSITIS: Primary | ICD-10-CM

## 2020-08-26 PROCEDURE — 99213 OFFICE O/P EST LOW 20 MIN: CPT | Performed by: FAMILY MEDICINE

## 2020-08-26 RX ORDER — AMOXICILLIN 875 MG
875 TABLET ORAL 2 TIMES DAILY
Qty: 28 TABLET | Refills: 0 | Status: SHIPPED | OUTPATIENT
Start: 2020-08-26 | End: 2020-09-01

## 2020-08-26 RX ORDER — ECHINACEA PURPUREA EXTRACT 125 MG
TABLET ORAL
Qty: 480 ML | Refills: 0 | Status: SHIPPED | OUTPATIENT
Start: 2020-08-26 | End: 2021-05-05

## 2020-08-26 NOTE — PROGRESS NOTES
Subjective     Mireya Yoon is a 20 year old female who presents to clinic today for the following health issues:    HPI   Left ear pressure  Patient reports she has been having fullness pressure in her ears, left more than the right.  She reports, almost a month ago she had episode of headache, pressure fullness.  She had an MRI, CT scan.  Initial MRI from July 30, 2020 showed mucosal thickening in the frontal left maxillary sinus area.  She was seen by neurology who treated her with 10 days course of amoxicillin she reported did get better.  The repeat CT scan showed improvement of her sinus infection.    Subsequent visit with neurology been started on Medrol Dosepak, Topamax for possible underlying migraine headache.  Patient reports she has been taking these medication for the past several days she has not seen any different.    She denies history of migraine headache she thinks her symptoms are sinus infection.  She reports when she was taking amoxicillin she was feeling better and her symptoms completely improved.    No recent history of travel, no swimming.  Denies drainage in her ears.  Denies postnasal drainage.  She has no fever, no headache.    Review of Systems   Constitutional, HEENT, cardiovascular, pulmonary, gi and gu systems are negative, except as otherwise noted.      Objective    /73 (BP Location: Right arm, Patient Position: Sitting, Cuff Size: Adult Regular)   Pulse 73   Temp 98.4  F (36.9  C) (Oral)   Wt 74.8 kg (165 lb)   SpO2 100%   BMI 26.23 kg/m    Body mass index is 26.23 kg/m .  Physical Exam   GENERAL: healthy, alert and no distress  HENT: ear canals and TM's normal, nose and mouth without ulcers or lesions  RESP: lungs clear to auscultation - no rales, rhonchi or wheezes    Recent Results (from the past 744 hour(s))   MRI Brain w & w/o contrast    Narrative    MR BRAIN W/O & W CONTRAST 7/30/2020 3:05 PM    Orbit MRI without and with contrast  Brain MRI without and with  contrast    History:  NEW HEADACHE ONSET WITH VISUAL DISTURBANCES AND DIZZINESS;  Acute intractable headache, unspecified headache type; Dizziness;  Visual disturbance.   ICD-10: Acute intractable headache, unspecified headache type;  Dizziness; Visual disturbance    Comparison: No prior similar studies     Technique:   Orbits: Axial and coronal T1-weighted, and coronal T2-weighted images  obtained without intravenous contrast. Post-intravenous contrast  (using gadolinium) sagittal FLAIR, and axial and coronal T1-weighted  images were obtained with fat-saturation, focused on the orbits.  Brain: Axial susceptibility-weighted and FLAIR sequences were obtained  of the whole brain without intravenous contrast, and postcontrast  axial T1-weighted images were obtained through the whole brain.   Contrast: 7.5mL Gadavist    Findings:  Regarding the orbits, no definite mass is noted of the globe, conal  structures, or within the orbital fat on either side. The optic nerves  appear normal.    Regarding the remainder of the brain, the ventricles are proportionate  to the cerebral sulci. There is no mass effect or midline shift  intracranially. The major intracranial vascular flow-voids are patent.  Post-contrast images of the whole brain demonstrate no abnormal intra  or extra-axial contrast enhancement. Persistent cavum septum  pellucidum.    Mucosal thickening and frothy debris in the left maxillary sinus.  Mucosal thickening in the right maxillary sinus.      Impression    Impression:    1. Regarding the orbits and globes, there is no definite abnormal  contrast enhancement or mass.  2. Mucosal thickening and frothy debris in the left maxillary sinus.  This may represent acute sinusitis in the appropriate clinical  setting.    CHARLES CHEN MD   CT Head w/o contrast    Narrative    CT SCAN OF THE HEAD WITHOUT CONTRAST   8/21/2020 2:22 PM     HISTORY: headache worsening after sinusitis treatment; Acute sinusitis  with  symptoms > 10 days; Acute intractable headache, unspecified  headache type    TECHNIQUE:  Axial images of the head and coronal reformations without  IV contrast material. Radiation dose for this scan was reduced using  automated exposure control, adjustment of the mA and/or kV according  to patient size, or iterative reconstruction technique.    COMPARISON: MRI head dated 7/30/2020.    FINDINGS: Cavum septum pellucidum et verge, as before. The ventricles  are not enlarged, and are similar in configuration to the prior study.  The cerebral hemispheres, brainstem and cerebellum have a normal  morphology and CT attenuation. No acute intracranial hemorrhage or  herniation. No extra-axial fluid collection.    The visualized aspects of the paranasal sinuses demonstrate no  significant mucosal thickening, and the frothy secretions seen left  maxillary sinus on the previous exam have resolved. The mastoid and  middle ear cavities are clear.      Impression    IMPRESSION:  1. No CT findings of acute intracranial abnormality.  2. Paranasal sinus mucosal thickening has decreased, and the  previously seen frothy secretions in the left maxillary sinus have  resolved.    NABILA LYNN MD             Assessment & Plan      Diagnosis Comments   1. Acute non-recurrent frontal sinusitis  amoxicillin (AMOXIL) 875 MG tablet, sodium chloride (OCEAN) 0.65 % nasal spray      Symptom could be related to chronic sinus infection.  Since she felt better with amoxicillin.  I did give her 2 more weeks of amoxicillin.  For possibly chronic sinus infection.  In addition, she was advised to do nasal irrigations. Continue with Medrol dose pack.    There are no Patient Instructions on file for this visit.    No follow-ups on file.    Juanito Parry MD  [facility]

## 2020-09-01 ENCOUNTER — TELEPHONE (OUTPATIENT)
Dept: FAMILY MEDICINE | Facility: CLINIC | Age: 20
End: 2020-09-01

## 2020-09-01 DIAGNOSIS — J01.10 ACUTE NON-RECURRENT FRONTAL SINUSITIS: Primary | ICD-10-CM

## 2020-09-01 RX ORDER — DOXYCYCLINE 100 MG/1
100 CAPSULE ORAL 2 TIMES DAILY
Qty: 20 CAPSULE | Refills: 0 | Status: SHIPPED | OUTPATIENT
Start: 2020-09-01 | End: 2020-10-19

## 2020-09-01 NOTE — TELEPHONE ENCOUNTER
Notified patient and advised her of the below orders.  Patient stated understanding and agreeable with the plan of care. Verena Weaver,RN,BSN, CPC Triage.

## 2020-09-01 NOTE — TELEPHONE ENCOUNTER
RN spoke with patient. She reports ongoing sinus pressure. She has been taking the Amoxicillin 875 BID as prescribed, and has had no relief of symptoms since 8/26/20 visit.     She is wondering if she should trial a different antibiotic?     No follow up instructions given in OV notes.     Routing to Dr. Parry to review and advise.     Sharlene Aguirre RN, BSN, PHN  [facility]

## 2020-09-01 NOTE — TELEPHONE ENCOUNTER
Reason for call:  Patient reporting a symptom    Symptom or request: Sinus infection    Duration (how long have symptoms been present): Couple of weeks    Have you been treated for this before? Yes    Additional comments: Patient called and stated she was seen recently for sinus infection and prescribed antibiotics, however, the pressure is still the same and she wonders if a different medication could be prescribed.  Patient also stated if a new script is put in, please send to [facility] [id number]    Phone Number patient can be reached at:  Home number on file [phone] (home)    Best Time:  ASAP    Can we leave a detailed message on this number:  YES    Call taken on 9/1/2020 at 3:16 PM by Patrizia Young

## 2020-09-01 NOTE — TELEPHONE ENCOUNTER
I have sent a new medications, Doxy bid for 10 days  Stop Amoxil  Continue with Flonase and Medrol  thanks

## 2020-10-07 ENCOUNTER — OFFICE VISIT (OUTPATIENT)
Dept: OTOLARYNGOLOGY | Facility: CLINIC | Age: 20
End: 2020-10-07
Payer: COMMERCIAL

## 2020-10-07 VITALS — BODY MASS INDEX: 26.36 KG/M2 | HEIGHT: 66 IN | RESPIRATION RATE: 16 BRPM | WEIGHT: 164 LBS

## 2020-10-07 DIAGNOSIS — G43.009 MIGRAINE WITHOUT AURA AND WITHOUT STATUS MIGRAINOSUS, NOT INTRACTABLE: Primary | ICD-10-CM

## 2020-10-07 PROCEDURE — 99203 OFFICE O/P NEW LOW 30 MIN: CPT | Performed by: OTOLARYNGOLOGY

## 2020-10-07 ASSESSMENT — MIFFLIN-ST. JEOR: SCORE: 1520.65

## 2020-10-07 NOTE — PROGRESS NOTES
Chief Complaint - sinusitis    History of Present Illness - Mireya Yoon is a 20 year old female who presents for evaluation of possible sinusitis. The patient describes symptoms of face pain for the past few months. Prior to this she didn't have much for sinusitis. However, she always feels nose is stuffed. She doesn't have discolored drainage. She describes a lot of pressure in cheeks and forehead. She tried Excedrin migraine. She also has some eye and ear pressure or plugging. Eye exam was normal. No prior history of sinus surgery. MRI brain 7/30/2020 showed left maxillary sinus fluid. CT head 8/21/2020 showed normal sinuses. She was given doxycycline and amoxicillin 9/1/2020. Neurology is treating her with topamax for migraine, but she only took it for 5 days. She also tried flonase, which worsened things. She still feels pressure in face now. Ear symptoms come and go. Nonsmoker.     Past Medical History -   Patient Active Problem List   Diagnosis     Iron deficiency anemia     Dysmenorrhea     Nexplanon in place     Anxiety       Current Medications -   Current Outpatient Medications:      doxycycline hyclate (VIBRAMYCIN) 100 MG capsule, Take 1 capsule (100 mg) by mouth 2 times daily, Disp: 20 capsule, Rfl: 0     fluticasone (FLONASE) 50 MCG/ACT nasal spray, Spray 1 spray into both nostrils daily (Patient not taking: Reported on 8/26/2020), Disp: 11.1 mL, Rfl: 0     methylPREDNISolone (MEDROL) 4 MG tablet therapy pack, Follow Package Directions, Disp: 21 tablet, Rfl: 0     Multiple Vitamins-Minerals (MULTIVITAMIN ADULT) CHEW, Take 2 chew tab by mouth daily, Disp: , Rfl:      naproxen (NAPROSYN) 500 MG tablet, Take 1 tablet (500 mg) by mouth every 12 hours as needed for headaches With food, Disp: 30 tablet, Rfl: 3     sertraline (ZOLOFT) 50 MG tablet, Take 1 tablet (50 mg) by mouth daily, Disp: 60 tablet, Rfl: 1     sodium chloride (OCEAN) 0.65 % nasal spray, Use bid for 10- 14 days, Disp: 480 mL, Rfl: 0      topiramate (TOPAMAX) 25 MG tablet, Take 25 mg at bedtime for one week then 50 mg at bedtime for one week, then 75 mg at bedtime, Disp: 90 tablet, Rfl: 3    Current Facility-Administered Medications:      etonogestrel (IMPLANON/NEXPLANON) subdermal implant 68 mg, 1 each, Subdermal, Continuous, Harriett Wiggins MD    Allergies -   Allergies   Allergen Reactions     Seasonal Allergies        Social History -   Social History     Socioeconomic History     Marital status: Single     Spouse name: Not on file     Number of children: Not on file     Years of education: Not on file     Highest education level: Not on file   Occupational History     Not on file   Social Needs     Financial resource strain: Not on file     Food insecurity     Worry: Not on file     Inability: Not on file     Transportation needs     Medical: Not on file     Non-medical: Not on file   Tobacco Use     Smoking status: Never Smoker     Smokeless tobacco: Never Used   Substance and Sexual Activity     Alcohol use: No     Alcohol/week: 0.0 standard drinks     Comment: seldom/rare     Drug use: No     Sexual activity: Yes     Partners: Male     Birth control/protection: Implant, Condom     Comment: Vag self-swab for GC, chlamydia sent 12/13/16   Lifestyle     Physical activity     Days per week: Not on file     Minutes per session: Not on file     Stress: Not on file   Relationships     Social connections     Talks on phone: Not on file     Gets together: Not on file     Attends Orthodoxy service: Not on file     Active member of club or organization: Not on file     Attends meetings of clubs or organizations: Not on file     Relationship status: Not on file     Intimate partner violence     Fear of current or ex partner: Not on file     Emotionally abused: Not on file     Physically abused: Not on file     Forced sexual activity: Not on file   Other Topics Concern     Parent/sibling w/ CABG, MI or angioplasty before 65F 55M? Not Asked    Social History Narrative     Not on file       Family History -   Family History   Problem Relation Age of Onset     Hypertension Mother      Gallbladder Disease Mother      Unknown/Adopted Father      Diabetes Maternal Grandmother      Glaucoma No family hx of      Macular Degeneration No family hx of        Review of Systems - As per HPI and PMHx, otherwise 10+ comprehensive system review is negative.    Physical Exam  General - The patient is nontoxic, in no distress. Alert and oriented to person and place, answers questions and cooperates with examination appropriately.   Neurologic - CN II-XII are intact. No focal neurologic deficits.   Voice and Breathing - The patient was breathing comfortably without the use of accessory muscles. There was no wheezing, stridor, or stertor.  The patients voice was clear and strong.  Eyes - Extraocular movements intact.  Sclera were not icteric or injected, conjunctiva were pink and moist.  Mouth - Examination of the oral cavity showed pink, healthy oral mucosa. No lesions or ulcerations noted.  The tongue was mobile and midline.  Throat - The walls of the oropharynx were smooth, symmetric, and had no lesions or ulcerations.  No postnasal drainage.  The uvula was midline on elevation. Tonsils 1+, quiet.  Ears - Bilateral pinna and EACs with normal appearing overlying skin. Tympanic membrane intact bilaterally. Bony landmarks of the ossicular chain are normal. No retraction, perforation, or masses.  No fluid or purulence was seen in the external canal or the middle ear.   Nose - External contour is symmetric, no gross deflection or scars. Nasal mucosa is pink and moist with no abnormal mucus.  The septum was midline and non-obstructive, turbinates of normal size and position left, mild hypertrophy right.  No polyps, masses, or purulence noted on examination.  Neck - Soft, non-tender. Palpation of the occipital, submental, submandibular, internal jugular chain, and  supraclavicular nodes did not demonstrate any abnormal lymph nodes or masses. No parotid masses. Palpation of the thyroid was soft and smooth, with no nodules or goiter appreciated.  The trachea was mobile and midline.  Cardiovascular - carotid pulses are 2+ bilaterally, regular rhythm        A/P - Mireya Yoon is a 20 year old female with facial pressure and pain in both cheeks and forehead areas.  I showed her the imaging including MRI brain and CT brain from over the summer.  She did have left maxillary sinus fluid and may be infection in July.  However her CT scan was normal by August.  She has not had localized symptoms but rather diffuse facial pain and pressure.  She never has discolored drainage.  Therefore I do not think this is sinusitis but rather a form of headache or migraine.  I encouraged her to take the Topamax for which she only did 5 days worth.  She should restart this and titrate up slowly.  If this fails or she develops discolored drainage we can consider CT sinus 1 more time while symptoms are bad to see if there is any evidence of sinusitis.  She does have some nasal obstruction which may or may not be from the headache phenomenon.  However Flonase made things much worse.  She does not seem to have allergy symptoms.  We could always try decongestant or antihistamine if this persists.      Hemal Zazueta MD  Otolaryngology  [facility]

## 2020-10-07 NOTE — LETTER
10/7/2020         RE: Mireya Yoon  [address]  [place] [address]        Dear Colleague,    Thank you for referring your patient, Mireya Yoon, to the [facility]. Please see a copy of my visit note below.    Chief Complaint - sinusitis    History of Present Illness - Mireya Yoon is a 20 year old female who presents for evaluation of possible sinusitis. The patient describes symptoms of face pain for the past few months. Prior to this she didn't have much for sinusitis. However, she always feels nose is stuffed. She doesn't have discolored drainage. She describes a lot of pressure in cheeks and forehead. She tried Excedrin migraine. She also has some eye and ear pressure or plugging. Eye exam was normal. No prior history of sinus surgery. MRI brain 7/30/2020 showed left maxillary sinus fluid. CT head 8/21/2020 showed normal sinuses. She was given doxycycline and amoxicillin 9/1/2020. Neurology is treating her with topamax for migraine, but she only took it for 5 days. She also tried flonase, which worsened things. She still feels pressure in face now. Ear symptoms come and go. Nonsmoker.     Past Medical History -   Patient Active Problem List   Diagnosis     Iron deficiency anemia     Dysmenorrhea     Nexplanon in place     Anxiety       Current Medications -   Current Outpatient Medications:      doxycycline hyclate (VIBRAMYCIN) 100 MG capsule, Take 1 capsule (100 mg) by mouth 2 times daily, Disp: 20 capsule, Rfl: 0     fluticasone (FLONASE) 50 MCG/ACT nasal spray, Spray 1 spray into both nostrils daily (Patient not taking: Reported on 8/26/2020), Disp: 11.1 mL, Rfl: 0     methylPREDNISolone (MEDROL) 4 MG tablet therapy pack, Follow Package Directions, Disp: 21 tablet, Rfl: 0     Multiple Vitamins-Minerals (MULTIVITAMIN ADULT) CHEW, Take 2 chew tab by mouth daily, Disp: , Rfl:      naproxen (NAPROSYN) 500 MG tablet, Take 1 tablet (500 mg) by mouth every 12 hours as needed for  headaches With food, Disp: 30 tablet, Rfl: 3     sertraline (ZOLOFT) 50 MG tablet, Take 1 tablet (50 mg) by mouth daily, Disp: 60 tablet, Rfl: 1     sodium chloride (OCEAN) 0.65 % nasal spray, Use bid for 10- 14 days, Disp: 480 mL, Rfl: 0     topiramate (TOPAMAX) 25 MG tablet, Take 25 mg at bedtime for one week then 50 mg at bedtime for one week, then 75 mg at bedtime, Disp: 90 tablet, Rfl: 3    Current Facility-Administered Medications:      etonogestrel (IMPLANON/NEXPLANON) subdermal implant 68 mg, 1 each, Subdermal, Continuous, Harriett Wiggins MD    Allergies -   Allergies   Allergen Reactions     Seasonal Allergies        Social History -   Social History     Socioeconomic History     Marital status: Single     Spouse name: Not on file     Number of children: Not on file     Years of education: Not on file     Highest education level: Not on file   Occupational History     Not on file   Social Needs     Financial resource strain: Not on file     Food insecurity     Worry: Not on file     Inability: Not on file     Transportation needs     Medical: Not on file     Non-medical: Not on file   Tobacco Use     Smoking status: Never Smoker     Smokeless tobacco: Never Used   Substance and Sexual Activity     Alcohol use: No     Alcohol/week: 0.0 standard drinks     Comment: seldom/rare     Drug use: No     Sexual activity: Yes     Partners: Male     Birth control/protection: Implant, Condom     Comment: Vag self-swab for GC, chlamydia sent 12/13/16   Lifestyle     Physical activity     Days per week: Not on file     Minutes per session: Not on file     Stress: Not on file   Relationships     Social connections     Talks on phone: Not on file     Gets together: Not on file     Attends Faith service: Not on file     Active member of club or organization: Not on file     Attends meetings of clubs or organizations: Not on file     Relationship status: Not on file     Intimate partner violence     Fear of  current or ex partner: Not on file     Emotionally abused: Not on file     Physically abused: Not on file     Forced sexual activity: Not on file   Other Topics Concern     Parent/sibling w/ CABG, MI or angioplasty before 65F 55M? Not Asked   Social History Narrative     Not on file       Family History -   Family History   Problem Relation Age of Onset     Hypertension Mother      Gallbladder Disease Mother      Unknown/Adopted Father      Diabetes Maternal Grandmother      Glaucoma No family hx of      Macular Degeneration No family hx of        Review of Systems - As per HPI and PMHx, otherwise 10+ comprehensive system review is negative.    Physical Exam  General - The patient is nontoxic, in no distress. Alert and oriented to person and place, answers questions and cooperates with examination appropriately.   Neurologic - CN II-XII are intact. No focal neurologic deficits.   Voice and Breathing - The patient was breathing comfortably without the use of accessory muscles. There was no wheezing, stridor, or stertor.  The patients voice was clear and strong.  Eyes - Extraocular movements intact.  Sclera were not icteric or injected, conjunctiva were pink and moist.  Mouth - Examination of the oral cavity showed pink, healthy oral mucosa. No lesions or ulcerations noted.  The tongue was mobile and midline.  Throat - The walls of the oropharynx were smooth, symmetric, and had no lesions or ulcerations.  No postnasal drainage.  The uvula was midline on elevation. Tonsils 1+, quiet.  Ears - Bilateral pinna and EACs with normal appearing overlying skin. Tympanic membrane intact bilaterally. Bony landmarks of the ossicular chain are normal. No retraction, perforation, or masses.  No fluid or purulence was seen in the external canal or the middle ear.   Nose - External contour is symmetric, no gross deflection or scars. Nasal mucosa is pink and moist with no abnormal mucus.  The septum was midline and non-obstructive,  turbinates of normal size and position left, mild hypertrophy right.  No polyps, masses, or purulence noted on examination.  Neck - Soft, non-tender. Palpation of the occipital, submental, submandibular, internal jugular chain, and supraclavicular nodes did not demonstrate any abnormal lymph nodes or masses. No parotid masses. Palpation of the thyroid was soft and smooth, with no nodules or goiter appreciated.  The trachea was mobile and midline.  Cardiovascular - carotid pulses are 2+ bilaterally, regular rhythm        A/P - Mireya ELOINA Yoon is a 20 year old female with facial pressure and pain in both cheeks and forehead areas.  I showed her the imaging including MRI brain and CT brain from over the summer.  She did have left maxillary sinus fluid and may be infection in July.  However her CT scan was normal by August.  She has not had localized symptoms but rather diffuse facial pain and pressure.  She never has discolored drainage.  Therefore I do not think this is sinusitis but rather a form of headache or migraine.  I encouraged her to take the Topamax for which she only did 5 days worth.  She should restart this and titrate up slowly.  If this fails or she develops discolored drainage we can consider CT sinus 1 more time while symptoms are bad to see if there is any evidence of sinusitis.  She does have some nasal obstruction which may or may not be from the headache phenomenon.  However Flonase made things much worse.  She does not seem to have allergy symptoms.  We could always try decongestant or antihistamine if this persists.      Hemal Zazueta MD  Otolaryngology  [facility]        Again, thank you for allowing me to participate in the care of your patient.        Sincerely,        Hemal Zazueta MD

## 2020-10-19 ENCOUNTER — NURSE TRIAGE (OUTPATIENT)
Dept: NURSING | Facility: CLINIC | Age: 20
End: 2020-10-19

## 2020-10-19 DIAGNOSIS — G43.009 MIGRAINE WITHOUT AURA AND WITHOUT STATUS MIGRAINOSUS, NOT INTRACTABLE: Primary | ICD-10-CM

## 2020-10-19 RX ORDER — SUMATRIPTAN 50 MG/1
50-100 TABLET, FILM COATED ORAL
Qty: 12 TABLET | Refills: 9 | Status: SHIPPED | OUTPATIENT
Start: 2020-10-19 | End: 2021-05-05

## 2020-10-19 NOTE — TELEPHONE ENCOUNTER
20 year old female was seen in neurology clinic on 8/24/20 for  symptoms of head fullness and brain fog feeling, and dizziness.  She was given a trial of Topamax.  She did not start this until last week because she was recovering from a sinus infection.  The 2nd day of taking the Topamax 25 mg she experienced extreme fatigue and nausea.  She states she actually vomited that day.  She states the nausea has improved slightly  She is nauseous when she wakes in the morning.  She states she is a very active person so the fatigue is very frustrating   She also states she feels like she is slow both with physical movement and mentally. She is to increase her Topamax to 50 mg tonight       Instructed her to hold at 25 mg until she hears back from the neurology clinic    Will forward to the neurology pool for further recommendations..

## 2020-10-19 NOTE — TELEPHONE ENCOUNTER
Provider called and spoke to the patient. Topiramate 25 mg at bedtime for a week but side effects-fatigue, drowsy and nausea, depression worsening. Patient was last seen on 8/24 for headaches and cancelled follow up on 10/12. Reports that she has been tested for allergies because of persistent pressure in the sinuses and seasonal allergies. Patient reports that sinus infection has cleared.   Patient reports that headaches are not nearly as bed as used to be. Patient has not been taking anything for headaches or any OTC.   Severe migraine headaches are phased out and uses naproxen  Patient reports that she is suffering from anxiety and on certainly     Plan:  Stop topiramate   Vitamin B2 (riboflavin) 200-400 mg daily OTC  Acupuncture referral alternatively   Headache log for frequency and severity  Acute migraine headache treatment -a trial of sumatriptan  mg at severe headache onset and may repeat in 2 hours as needed if headache recurs and max 4 doses in 24 hours. Side effects-drowsiness, nausea, jaw pain. Stop using with any chest pain.    May take sumatriptan with naproxen.   Follow up in 1-2 months or sooner if needed

## 2020-11-02 NOTE — TELEPHONE ENCOUNTER
Head, normocephalic, atraumatic, Face, Face within normal limits, Ears, External ears within normal limits, Nose/Nasopharynx, External nose  normal appearance, nares patent, no nasal discharge, Mouth and Throat, Oral cavity appearance normal, Breath odor normal, Lips, Appearance normal Reason for Call:  Medication or medication refill:    Do you use a [facility]?  Name of the pharmacy and phone number for the current request:  [facility] [address] [place] [address] AVE [address] AT [facility]    Name of the medication requested: PRN of aderax     Other request: Mom called stating Mireya is in axiety  Over drive from school ending..Mom states that the Pt has an appointment with her phycologist in a week, but needs something to help her while on vacation     Can we leave a detailed message on this number? YES    Phone number patient can be reached at: Other phone number:  [phone]    Best Time: anytime    Call taken on 7/5/2018 at 12:07 PM by Becky Guallpa

## 2020-11-16 ENCOUNTER — HEALTH MAINTENANCE LETTER (OUTPATIENT)
Age: 20
End: 2020-11-16

## 2021-02-09 ENCOUNTER — TELEPHONE (OUTPATIENT)
Dept: OPHTHALMOLOGY | Facility: CLINIC | Age: 21
End: 2021-02-09

## 2021-02-09 ASSESSMENT — REFRACTION_MANIFEST
OS_CYLINDER: +0.50
OS_SPHERE: -3.25
OD_SPHERE: -3.00
OD_SPHERE: -2.75
OD_AXIS: 010
OS_AXIS: 134
OD_CYLINDER: +0.75
OS_SPHERE: -3.00
OD_AXIS: 013
OS_CYLINDER: SPHERE
OD_CYLINDER: +0.75

## 2021-02-09 NOTE — TELEPHONE ENCOUNTER
Patients bought glasses through us and she isnt seeing as well out of the new pair as her current pair.  She asked if we can go back to her old RX or if you would want to see her again?      Old RX  -2.75+0.75x13  -3.00+0.50x134

## 2021-04-02 ENCOUNTER — TELEPHONE (OUTPATIENT)
Dept: FAMILY MEDICINE | Facility: CLINIC | Age: 21
End: 2021-04-02

## 2021-04-02 NOTE — TELEPHONE ENCOUNTER
Patient having trouble with new insurance. She has to find a new psyciatry Dr. She has been on sertraline 100 mg. 1x daily for 10 yrs. Can   Dr Ruano refill med for her until she finds a new Dr? Ok to leave a detailed message.

## 2021-04-05 NOTE — TELEPHONE ENCOUNTER
Patient has not seen Dr. Ruano since 12/2019.    Left message for patient as requested below. Patient will need to make an appointment if she wants Dr. Ruano to refill OR she should reach out to the provider who refilled the sertraline last to see if that provider will refill.    Ralph Banks RN

## 2021-05-04 NOTE — PROGRESS NOTES
Mireya is a 20 year old who is being evaluated via a billable video visit.      How would you like to obtain your AVS? MyChart  If the video visit is dropped, the invitation should be resent by: Text to cell phone: [phone]  Will anyone else be joining your video visit? No      Video Start Time: 11:51 AM      ICD-10-CM    1. MARY (generalized anxiety disorder)  F41.1 sertraline (ZOLOFT) 100 MG tablet     She had been seeing psych, but has been stable on med for a year. Her psych retired and has not found a new one  Gave options of management here as she is stable  Follow up for annual visit in June  If declined needs to be seen in 6 months      Subjective   Mireya is a 20 year old who presents for the following health issues  accompanied by her self:    HPI     Looking for new psychiatrist currently    Anxiety Follow-Up    How are you doing with your anxiety since your last visit? No change, stable     Are you having other symptoms that might be associated with anxiety? No    Have you had a significant life event? No     Are you feeling depressed? No    Do you have any concerns with your use of alcohol or other drugs? No    Zoloft in the past and has done different doses and she has been talking to NP   She is trying to find a new psych    zoloft was 100mg and is doing bettter, -it was changed in 9/2020  She has been on zoloft since age 10  No side effects    No drug use,  No other meds          MARY-7 SCORE 12/18/2018 5/4/2021   Total Score - 4 (minimal anxiety)   Total Score 16 4       PHQ 5/4/2021   PHQ-9 Total Score 4   Q9: Thoughts of better off dead/self-harm past 2 weeks Not at all       Social History     Tobacco Use     Smoking status: Never Smoker     Smokeless tobacco: Never Used   Substance Use Topics     Alcohol use: No     Alcohol/week: 0.0 standard drinks     Comment: seldom/rare     Drug use: No     MARY-7 SCORE 12/18/2018   Total Score 16     No flowsheet data found.  Last PHQ-9 5/4/2021   1.  Little  interest or pleasure in doing things 0   2.  Feeling down, depressed, or hopeless 0   3.  Trouble falling or staying asleep, or sleeping too much 2   4.  Feeling tired or having little energy 2   5.  Poor appetite or overeating 0   6.  Feeling bad about yourself 0   7.  Trouble concentrating 0   8.  Moving slowly or restless 0   Q9: Thoughts of better off dead/self-harm past 2 weeks 0   PHQ-9 Total Score 4     MARY-7  5/4/2021   1. Feeling nervous, anxious, or on edge 1   2. Not being able to stop or control worrying 1   3. Worrying too much about different things 1   4. Trouble relaxing 0   5. Being so restless that it is hard to sit still 0   6. Becoming easily annoyed or irritable 1   7. Feeling afraid, as if something awful might happen 0   MARY-7 Total Score 4   If you checked any problems, how difficult have they made it for you to do your work, take care of things at home, or get along with other people? -             Review of Systems   Constitutional, HEENT, cardiovascular, pulmonary, GI, , musculoskeletal, neuro, skin, endocrine and psych systems are negative, except as otherwise noted.      Objective           Vitals:  No vitals were obtained today due to virtual visit.    Physical Exam   GENERAL: Healthy, alert and no distress  EYES: Eyes grossly normal to inspection.  No discharge or erythema, or obvious scleral/conjunctival abnormalities.  RESP: No audible wheeze, cough, or visible cyanosis.  No visible retractions or increased work of breathing.    SKIN: Visible skin clear. No significant rash, abnormal pigmentation or lesions.  NEURO: Cranial nerves grossly intact.  Mentation and speech appropriate for age.  PSYCH: Mentation appears normal, affect normal/bright, judgement and insight intact, normal speech and appearance well-groomed.            Video-Visit Details    Type of service:  Video Visit    Video End Time:12:03 PM    Originating Location (pt. Location): Home    Distant Location (provider  location):  [facility]     Platform used for Video Visit: Delvin

## 2021-05-05 ENCOUNTER — VIRTUAL VISIT (OUTPATIENT)
Dept: FAMILY MEDICINE | Facility: CLINIC | Age: 21
End: 2021-05-05
Payer: COMMERCIAL

## 2021-05-05 DIAGNOSIS — F41.1 GAD (GENERALIZED ANXIETY DISORDER): Primary | ICD-10-CM

## 2021-05-05 PROCEDURE — 99213 OFFICE O/P EST LOW 20 MIN: CPT | Mod: 95 | Performed by: FAMILY MEDICINE

## 2021-05-05 RX ORDER — SERTRALINE HYDROCHLORIDE 100 MG/1
100 TABLET, FILM COATED ORAL DAILY
Qty: 90 TABLET | Refills: 1 | Status: SHIPPED | OUTPATIENT
Start: 2021-05-05 | End: 2021-10-21

## 2021-06-01 NOTE — PROGRESS NOTES
ICD-10-CM    1. Iron deficiency anemia, unspecified iron deficiency anemia type  D50.9 CBC with platelets and differential     Ferritin     Iron and iron binding capacity     ferrous sulfate (SLO-FE) 142 (45 Fe) MG CR tablet   2. Seasonal allergic rhinitis due to pollen  J30.1 ALLERGY/ASTHMA ADULT REFERRAL     loratadine (CLARITIN) 10 MG tablet   3. Dysmenorrhea  N94.6 CBC with platelets and differential     Ferritin     Iron and iron binding capacity   4. Nexplanon in place  Z97.5      Seasonal allergy, she has tried many meds but not claritin, allergy meds as planned, if not better, consider allergy referral    Dysmenorrhea-heavy bleeding, she is on nexplanon-advised iud, iud consult done, mirena hand out to be given today  Consider switching to IUD, consult done today, call to get medication before iud placement at the end of period    Anemia-hemoglobin is lower, start iron with vit C(or OJ) recheck in august at her physical     Subjective   Mireya is a 20 year old who presents for the following health issues  accompanied by her self:    HPI     Patient would like her hemoglobin checked because symptoms have returned. She has dealt with this in the past. Not sure how long ago her last low hemoglobin was.     Patient looking for a good multivitamin, maybe one with iron in it.     Concern - low hemoglobin  Onset: a few weeks   Description: craves ice chewing, Fatigue, low energy    Irregular periods with nexplanon  7-10 days of bleeding  lightl the 4-5 days heavy and light for 5  No dizziness, no real headaches or chest pain  Cramps now came back , needs to replace it this December      Seasonal allergies-nothing works, zyrtex or allegra and flonase and nothing works  Sinus pressures, sneezes, no congestion    Anxiety and depression-mood stable    IUD consult        Review of Systems   Constitutional, HEENT, cardiovascular, pulmonary, GI, , musculoskeletal, neuro, skin, endocrine and psych systems are negative,  "except as otherwise noted.      Objective    /70   Pulse 86   Temp 98  F (36.7  C) (Oral)   Resp 16   Ht 1.676 m (5' 6\")   Wt 77.6 kg (171 lb)   SpO2 99%   BMI 27.60 kg/m    Body mass index is 27.6 kg/m .  Physical Exam   GENERAL: healthy, alert and no distress  NECK: no adenopathy, no asymmetry, masses, or scars and thyroid normal to palpation  RESP: lungs clear to auscultation - no rales, rhonchi or wheezes  CV: regular rate and rhythm, normal S1 S2, no S3 or S4, no murmur, click or rub, no peripheral edema and peripheral pulses strong  ABDOMEN: soft, nontender, no hepatosplenomegaly, no masses and bowel sounds normal  MS: no gross musculoskeletal defects noted, no edema                "

## 2021-06-02 ENCOUNTER — OFFICE VISIT (OUTPATIENT)
Dept: FAMILY MEDICINE | Facility: CLINIC | Age: 21
End: 2021-06-02
Payer: COMMERCIAL

## 2021-06-02 VITALS
RESPIRATION RATE: 16 BRPM | WEIGHT: 171 LBS | OXYGEN SATURATION: 99 % | HEIGHT: 66 IN | DIASTOLIC BLOOD PRESSURE: 70 MMHG | BODY MASS INDEX: 27.48 KG/M2 | TEMPERATURE: 98 F | HEART RATE: 86 BPM | SYSTOLIC BLOOD PRESSURE: 120 MMHG

## 2021-06-02 DIAGNOSIS — N94.6 DYSMENORRHEA: ICD-10-CM

## 2021-06-02 DIAGNOSIS — J30.1 SEASONAL ALLERGIC RHINITIS DUE TO POLLEN: ICD-10-CM

## 2021-06-02 DIAGNOSIS — Z97.5 NEXPLANON IN PLACE: ICD-10-CM

## 2021-06-02 DIAGNOSIS — D50.9 IRON DEFICIENCY ANEMIA, UNSPECIFIED IRON DEFICIENCY ANEMIA TYPE: Primary | ICD-10-CM

## 2021-06-02 LAB
BASOPHILS # BLD AUTO: 0 10E9/L (ref 0–0.2)
BASOPHILS NFR BLD AUTO: 0.6 %
DIFFERENTIAL METHOD BLD: ABNORMAL
EOSINOPHIL # BLD AUTO: 0.3 10E9/L (ref 0–0.7)
EOSINOPHIL NFR BLD AUTO: 5.5 %
ERYTHROCYTE [DISTWIDTH] IN BLOOD BY AUTOMATED COUNT: 13.6 % (ref 10–15)
FERRITIN SERPL-MCNC: 16 NG/ML (ref 12–150)
HCT VFR BLD AUTO: 35 % (ref 35–47)
HGB BLD-MCNC: 10.9 G/DL (ref 11.7–15.7)
IRON SATN MFR SERPL: 53 % (ref 15–46)
IRON SERPL-MCNC: 154 UG/DL (ref 35–180)
LYMPHOCYTES # BLD AUTO: 1.9 10E9/L (ref 0.8–5.3)
LYMPHOCYTES NFR BLD AUTO: 30.4 %
MCH RBC QN AUTO: 25.3 PG (ref 26.5–33)
MCHC RBC AUTO-ENTMCNC: 31.1 G/DL (ref 31.5–36.5)
MCV RBC AUTO: 81 FL (ref 78–100)
MONOCYTES # BLD AUTO: 0.7 10E9/L (ref 0–1.3)
MONOCYTES NFR BLD AUTO: 10.8 %
NEUTROPHILS # BLD AUTO: 3.3 10E9/L (ref 1.6–8.3)
NEUTROPHILS NFR BLD AUTO: 52.7 %
PLATELET # BLD AUTO: 331 10E9/L (ref 150–450)
RBC # BLD AUTO: 4.31 10E12/L (ref 3.8–5.2)
TIBC SERPL-MCNC: 288 UG/DL (ref 240–430)
WBC # BLD AUTO: 6.2 10E9/L (ref 4–11)

## 2021-06-02 PROCEDURE — 36415 COLL VENOUS BLD VENIPUNCTURE: CPT | Performed by: FAMILY MEDICINE

## 2021-06-02 PROCEDURE — 83540 ASSAY OF IRON: CPT | Performed by: FAMILY MEDICINE

## 2021-06-02 PROCEDURE — 99214 OFFICE O/P EST MOD 30 MIN: CPT | Performed by: FAMILY MEDICINE

## 2021-06-02 PROCEDURE — 83550 IRON BINDING TEST: CPT | Performed by: FAMILY MEDICINE

## 2021-06-02 PROCEDURE — 82728 ASSAY OF FERRITIN: CPT | Performed by: FAMILY MEDICINE

## 2021-06-02 PROCEDURE — 85025 COMPLETE CBC W/AUTO DIFF WBC: CPT | Performed by: FAMILY MEDICINE

## 2021-06-02 RX ORDER — LORATADINE 10 MG/1
10 TABLET ORAL DAILY
Qty: 90 TABLET | Refills: 1 | Status: SHIPPED | OUTPATIENT
Start: 2021-06-02 | End: 2022-01-30

## 2021-06-02 ASSESSMENT — MIFFLIN-ST. JEOR: SCORE: 1552.4

## 2021-06-02 NOTE — PATIENT INSTRUCTIONS
Please use allergy meds as planned, if not better, consider allergy referral    mirena hand out to be given today  Consider switching to IUD, consult done today, call to get medication before iud placement at the end of period    Your hemoglobin is lower, start iron with vit C(or OJ)       Patient Education

## 2021-06-21 ENCOUNTER — TELEPHONE (OUTPATIENT)
Dept: FAMILY MEDICINE | Facility: CLINIC | Age: 21
End: 2021-06-21

## 2021-06-21 DIAGNOSIS — Z30.8 ENCOUNTER FOR OTHER CONTRACEPTIVE MANAGEMENT: Primary | ICD-10-CM

## 2021-06-21 RX ORDER — MISOPROSTOL 200 UG/1
TABLET ORAL
Qty: 2 TABLET | Refills: 0 | Status: SHIPPED | OUTPATIENT
Start: 2021-06-21 | End: 2021-07-07

## 2021-06-21 NOTE — TELEPHONE ENCOUNTER
Reason for Call:  Medication or medication refill:    Do you use a [facility]?  Name of the pharmacy and phone number for the current request:  [address] [place], [place] [phone]    Name of the medication requested:  Medication to take right before getting her IUD placement. Dr Ruano said she would prescribe something.    Other request:     Can we leave a detailed message on this number? YES    Phone number patient can be reached at: Home number on file [phone] (home)    Best Time: any    Call taken on 6/21/2021 at 11:48 AM by Viktoriya Salgado

## 2021-06-21 NOTE — TELEPHONE ENCOUNTER
Routing to Dr Ruano      Do you prescribe medication prior to IUD placement?    IUD apt scheduled for 7/7/21.    Pt uses Sara Llanes RN

## 2021-06-23 ENCOUNTER — OFFICE VISIT (OUTPATIENT)
Dept: MIDWIFE SERVICES | Facility: CLINIC | Age: 21
End: 2021-06-23
Payer: COMMERCIAL

## 2021-06-23 VITALS
SYSTOLIC BLOOD PRESSURE: 132 MMHG | TEMPERATURE: 98.4 F | WEIGHT: 178 LBS | BODY MASS INDEX: 28.73 KG/M2 | HEART RATE: 76 BPM | DIASTOLIC BLOOD PRESSURE: 77 MMHG

## 2021-06-23 DIAGNOSIS — Z30.46 ENCOUNTER FOR NEXPLANON REMOVAL: Primary | ICD-10-CM

## 2021-06-23 PROCEDURE — 11982 REMOVE DRUG IMPLANT DEVICE: CPT | Performed by: ADVANCED PRACTICE MIDWIFE

## 2021-06-23 NOTE — PROGRESS NOTES
Nexplanon Removal:     Is a pregnancy test required: No.  Was a consent obtained?  Yes    Mireya Yoon is here for removal of etonogestrel implant Nexplanon/Implanon. She had the nexplanon for 2 years. Having issues with bleeding. This is her second Nexplanon, she did not have bleeding issues the first one. Desires IUD placement. Mirena placement scheduled for 7/7/21 with her primary provider. She wanted the nexplanon out earlier than IUD placement date because she is aiming to get the IUD placed with her first menses. Condoms until placement. Boyfriend lives out of town anyway. STI testing in December and negative. Same partner.       Preoperative Diagnosis: etonogestrel implant  Postoperative Diagnosis: etonogestrel implant removed    Technique: On the left arm  Skin prep Betadine  Anesthesia 1% lidocaine (of note, needed an extra 2 ml of lidocaine and a longer wait time before she was numb)  Procedure: Small incision (<5mm) was made at distal end of palpable implant, curved hemostat or mosquito forceps was used to isolate the implant and bring it to the incision, the fibrous capsule containing the implant  was incised and the Implant was removed intact.    EBL: minimal  Complications:  No  Tolerance:  Pt tolerated procedure well and was in stable condition.   Dressing:    A pressure bandage was placed for the next 12-24 hours.    Contraception was discussed and patient chose the following method Mirena IUD      Follow up: Pt was instructed to call if bleeding, severe pain or foul smell.     SHANTELL Lebron CNM

## 2021-06-28 ENCOUNTER — MYC MEDICAL ADVICE (OUTPATIENT)
Dept: FAMILY MEDICINE | Facility: CLINIC | Age: 21
End: 2021-06-28

## 2021-07-06 NOTE — PROGRESS NOTES
IUD  INSERTION PROCEDURE   Mireya Yoon who presents for Mirena IUD insertion. Indication for IUD insertion is contraception. Patient's last menstrual period was No LMP recorded. Patient has had an implant.. . The patient is currently using OCPs for contraception. She is in a monogamous sexual relationship.   Lab Results   Component Value Date    PAP NIL 12/30/2019      pregnancy test :neg  A complete discussion of the risks and benefits of IUD use and the details of the insertion procedure was held with the patient.   All questions were answered. A consent form was signed.   Prior to the beginning of the procedure the team paused to verify the patient's identity, as well as the procedure to be performed and the site. All equipment required was ready and available. The patient was positioned appropriately.   IUD Lot # [id number]  NDC # 28255-492-11 USE ONLY FOR MIRENA  The patient was placed in low lithotomy. A bimanual exam was performed and the uterus noted to be retro. A speculum was placed and the cervix swabbed with Betadine. A tenaculum was placed on the anterior cervical lip. The uterus sounded to 7cm cm. The Mirena IUD was placed to the uterine fundus without difficulty. The strings were cut to 3 cms. The tenaculum was removed and hemostasis was ensured. The speculum was removed. The patient tolerated the procedure well.   PLAN:   The patient was asked to contact the clinic for any fever/chills/severe pelvic or abdominal pain or heavy bleeding. She was instructed in how to palpate her IUD strings.   FOLLOW-UP:   She was asked to follow up in one month for IUD check, and for her routine annual screening.     Cheli Ruano DO

## 2021-07-07 ENCOUNTER — OFFICE VISIT (OUTPATIENT)
Dept: FAMILY MEDICINE | Facility: CLINIC | Age: 21
End: 2021-07-07
Payer: COMMERCIAL

## 2021-07-07 VITALS
HEART RATE: 68 BPM | DIASTOLIC BLOOD PRESSURE: 64 MMHG | SYSTOLIC BLOOD PRESSURE: 110 MMHG | HEIGHT: 66 IN | WEIGHT: 174 LBS | TEMPERATURE: 98 F | OXYGEN SATURATION: 100 % | RESPIRATION RATE: 16 BRPM | BODY MASS INDEX: 27.97 KG/M2

## 2021-07-07 DIAGNOSIS — Z30.430 ENCOUNTER FOR INSERTION OF INTRAUTERINE CONTRACEPTIVE DEVICE: Primary | ICD-10-CM

## 2021-07-07 LAB — HCG UR QL: NEGATIVE

## 2021-07-07 PROCEDURE — 81025 URINE PREGNANCY TEST: CPT | Performed by: FAMILY MEDICINE

## 2021-07-07 PROCEDURE — 58300 INSERT INTRAUTERINE DEVICE: CPT | Performed by: FAMILY MEDICINE

## 2021-07-07 ASSESSMENT — PAIN SCALES - GENERAL: PAINLEVEL: NO PAIN (0)

## 2021-07-07 ASSESSMENT — MIFFLIN-ST. JEOR: SCORE: 1561.01

## 2021-07-23 ENCOUNTER — NURSE TRIAGE (OUTPATIENT)
Dept: NURSING | Facility: CLINIC | Age: 21
End: 2021-07-23

## 2021-07-24 ENCOUNTER — HOSPITAL ENCOUNTER (EMERGENCY)
Facility: CLINIC | Age: 21
Discharge: HOME | End: 2021-07-24
Attending: EMERGENCY MEDICINE | Admitting: EMERGENCY MEDICINE
Payer: COMMERCIAL

## 2021-07-24 ENCOUNTER — APPOINTMENT (OUTPATIENT)
Dept: ULTRASOUND IMAGING | Facility: CLINIC | Age: 21
End: 2021-07-24
Attending: EMERGENCY MEDICINE
Payer: COMMERCIAL

## 2021-07-24 VITALS
HEART RATE: 86 BPM | DIASTOLIC BLOOD PRESSURE: 93 MMHG | OXYGEN SATURATION: 100 % | SYSTOLIC BLOOD PRESSURE: 128 MMHG | TEMPERATURE: 99.2 F | RESPIRATION RATE: 14 BRPM

## 2021-07-24 DIAGNOSIS — T83.84XA PAIN DUE TO INTRAUTERINE CONTRACEPTIVE DEVICE (IUD) (H): ICD-10-CM

## 2021-07-24 DIAGNOSIS — Z30.432 ENCOUNTER FOR IUD REMOVAL: ICD-10-CM

## 2021-07-24 LAB
ABO/RH(D): NORMAL
ANION GAP SERPL CALCULATED.3IONS-SCNC: 6 MMOL/L (ref 3–14)
ANTIBODY SCREEN: NEGATIVE
BASOPHILS # BLD AUTO: 0 10E3/UL (ref 0–0.2)
BASOPHILS NFR BLD AUTO: 1 %
BUN SERPL-MCNC: 9 MG/DL (ref 7–30)
CALCIUM SERPL-MCNC: 8.9 MG/DL (ref 8.5–10.1)
CHLORIDE BLD-SCNC: 106 MMOL/L (ref 94–109)
CO2 SERPL-SCNC: 25 MMOL/L (ref 20–32)
CREAT SERPL-MCNC: 0.75 MG/DL (ref 0.52–1.04)
EOSINOPHIL # BLD AUTO: 0.2 10E3/UL (ref 0–0.7)
EOSINOPHIL NFR BLD AUTO: 3 %
ERYTHROCYTE [DISTWIDTH] IN BLOOD BY AUTOMATED COUNT: 14.6 % (ref 10–15)
GFR SERPL CREATININE-BSD FRML MDRD: >90 ML/MIN/1.73M2
GLUCOSE BLD-MCNC: 73 MG/DL (ref 70–99)
HCT VFR BLD AUTO: 38.3 % (ref 35–47)
HGB BLD-MCNC: 11.7 G/DL (ref 11.7–15.7)
IMM GRANULOCYTES # BLD: 0 10E3/UL
IMM GRANULOCYTES NFR BLD: 0 %
LACTATE SERPL-SCNC: 0.7 MMOL/L (ref 0.7–2)
LYMPHOCYTES # BLD AUTO: 2 10E3/UL (ref 0.8–5.3)
LYMPHOCYTES NFR BLD AUTO: 31 %
MCH RBC QN AUTO: 25.2 PG (ref 26.5–33)
MCHC RBC AUTO-ENTMCNC: 30.5 G/DL (ref 31.5–36.5)
MCV RBC AUTO: 82 FL (ref 78–100)
MONOCYTES # BLD AUTO: 0.5 10E3/UL (ref 0–1.3)
MONOCYTES NFR BLD AUTO: 8 %
NEUTROPHILS # BLD AUTO: 3.7 10E3/UL (ref 1.6–8.3)
NEUTROPHILS NFR BLD AUTO: 57 %
NRBC # BLD AUTO: 0 10E3/UL
NRBC BLD AUTO-RTO: 0 /100
PLATELET # BLD AUTO: 300 10E3/UL (ref 150–450)
POTASSIUM BLD-SCNC: 3.8 MMOL/L (ref 3.4–5.3)
RBC # BLD AUTO: 4.65 10E6/UL (ref 3.8–5.2)
SODIUM SERPL-SCNC: 137 MMOL/L (ref 133–144)
SPECIMEN EXPIRATION DATE: NORMAL
WBC # BLD AUTO: 6.5 10E3/UL (ref 4–11)

## 2021-07-24 PROCEDURE — 80048 BASIC METABOLIC PNL TOTAL CA: CPT | Performed by: EMERGENCY MEDICINE

## 2021-07-24 PROCEDURE — 85025 COMPLETE CBC W/AUTO DIFF WBC: CPT | Performed by: EMERGENCY MEDICINE

## 2021-07-24 PROCEDURE — 58301 REMOVE INTRAUTERINE DEVICE: CPT | Performed by: EMERGENCY MEDICINE

## 2021-07-24 PROCEDURE — 58301 REMOVE INTRAUTERINE DEVICE: CPT

## 2021-07-24 PROCEDURE — 250N000011 HC RX IP 250 OP 636: Performed by: EMERGENCY MEDICINE

## 2021-07-24 PROCEDURE — 96361 HYDRATE IV INFUSION ADD-ON: CPT | Mod: 59

## 2021-07-24 PROCEDURE — 99284 EMERGENCY DEPT VISIT MOD MDM: CPT | Performed by: EMERGENCY MEDICINE

## 2021-07-24 PROCEDURE — 83605 ASSAY OF LACTIC ACID: CPT | Performed by: EMERGENCY MEDICINE

## 2021-07-24 PROCEDURE — 99285 EMERGENCY DEPT VISIT HI MDM: CPT | Mod: 25

## 2021-07-24 PROCEDURE — 76856 US EXAM PELVIC COMPLETE: CPT | Mod: 26 | Performed by: RADIOLOGY

## 2021-07-24 PROCEDURE — 258N000003 HC RX IP 258 OP 636: Performed by: EMERGENCY MEDICINE

## 2021-07-24 PROCEDURE — 96374 THER/PROPH/DIAG INJ IV PUSH: CPT

## 2021-07-24 PROCEDURE — 76830 TRANSVAGINAL US NON-OB: CPT

## 2021-07-24 PROCEDURE — 76830 TRANSVAGINAL US NON-OB: CPT | Mod: 26 | Performed by: RADIOLOGY

## 2021-07-24 PROCEDURE — 36415 COLL VENOUS BLD VENIPUNCTURE: CPT | Performed by: EMERGENCY MEDICINE

## 2021-07-24 PROCEDURE — 86900 BLOOD TYPING SEROLOGIC ABO: CPT | Performed by: EMERGENCY MEDICINE

## 2021-07-24 RX ORDER — KETOROLAC TROMETHAMINE 15 MG/ML
15 INJECTION, SOLUTION INTRAMUSCULAR; INTRAVENOUS ONCE
Status: COMPLETED | OUTPATIENT
Start: 2021-07-24 | End: 2021-07-24

## 2021-07-24 RX ORDER — OMEGA-3 FATTY ACIDS/FISH OIL 300-1000MG
200 CAPSULE ORAL EVERY 4 HOURS PRN
COMMUNITY
End: 2022-01-30

## 2021-07-24 RX ORDER — SODIUM CHLORIDE 9 MG/ML
INJECTION, SOLUTION INTRAVENOUS CONTINUOUS
Status: DISCONTINUED | OUTPATIENT
Start: 2021-07-24 | End: 2021-07-24 | Stop reason: HOSPADM

## 2021-07-24 RX ORDER — ASCORBIC ACID 250 MG
250 TABLET,CHEWABLE ORAL DAILY
COMMUNITY
End: 2022-01-30

## 2021-07-24 RX ADMIN — KETOROLAC TROMETHAMINE 15 MG: 15 INJECTION, SOLUTION INTRAMUSCULAR; INTRAVENOUS at 19:54

## 2021-07-24 RX ADMIN — SODIUM CHLORIDE 1000 ML: 9 INJECTION, SOLUTION INTRAVENOUS at 16:22

## 2021-07-24 ASSESSMENT — ENCOUNTER SYMPTOMS: CRAMPS: 1

## 2021-07-24 NOTE — ED PROVIDER NOTES
[facility] EMERGENCY DEPARTMENT ([facility])   July 24, 2021 ED 6   History     Chief Complaint   Patient presents with     Abdominal Cramping     Patient had an IUD placed on Monday or Tuesday last week and since then has been having severe abdominal cramping.     The history is provided by the patient and medical records.     Mireya Yoon is a 21 year old female with recent IUD placement who presents with severe pelvic cramping requesting IUD removal.  Patient had just had her IUD placed on 7/7/2021 in primary care clinic with Dr. Cheli Ruano. She took Cytotec at 8 hours and 2 hours prior to IUD insertion. Patient states that she developed pelvic cramping with the Cytotec, and the pelvic cramping persisted through procedure.  On physical exam she was noted to have an anteverted uterus.  She tolerated Mirena IUD placement well.  However she has had persistent pelvic cramping, waxing and waning, similar to prior menstrual cramps. She has tried ibuprofen and Tylenol at 9am today no relief of symptoms. She called nursing line to have IUD removed and referred to the Emergency Department for that purpose.  Last menstrual period 7/3/21. She has some subjective fevers, some spotting but no discharge. No other symptoms noted.       PAST MEDICAL HISTORY:   Past Medical History:   Diagnosis Date     Anxiety        PAST SURGICAL HISTORY: History reviewed. No pertinent surgical history.    Past medical history, past surgical history, medications, and allergies were reviewed with the patient. Additional pertinent items: None    FAMILY HISTORY:   Family History   Problem Relation Age of Onset     Hypertension Mother      Gallbladder Disease Mother      Unknown/Adopted Father      Diabetes Maternal Grandmother      Glaucoma No family hx of      Macular Degeneration No family hx of        SOCIAL HISTORY:   Social History     Tobacco Use     Smoking status: Never Smoker     Smokeless tobacco: Never Used   Substance Use  Topics     Alcohol use: Yes     Alcohol/week: 0.0 standard drinks     Comment: seldom/rare     Social history was reviewed with the patient. Additional pertinent items: None      Patient's Medications   New Prescriptions    No medications on file   Previous Medications    ASCORBIC ACID (VITAMIN C) 250 MG CHEW CHEWABLE TABLET    Take 250 mg by mouth daily    FERROUS SULFATE (SLO-FE) 142 (45 FE) MG CR TABLET    Take 142 mg by mouth daily    IBUPROFEN (ADVIL/MOTRIN) 200 MG CAPSULE    Take 200 mg by mouth every 4 hours as needed for fever    LEVONORGESTREL (MIRENA) 20 MCG/24HR IUD    1 each (20 mcg) by Intrauterine route once    LORATADINE (CLARITIN) 10 MG TABLET    Take 1 tablet (10 mg) by mouth daily    SERTRALINE (ZOLOFT) 100 MG TABLET    Take 1 tablet (100 mg) by mouth daily   Modified Medications    No medications on file   Discontinued Medications    No medications on file          Allergies   Allergen Reactions     Seasonal Allergies         Review of Systems   Genitourinary: Positive for pelvic pain and vaginal bleeding. Negative for vaginal discharge.     A complete review of systems was performed with pertinent positives and negatives noted in the HPI, and all other systems negative.    Physical Exam   BP: 134/82  Pulse: 82  Temp: 99.2  F (37.3  C)  Resp: 14  SpO2: 100 %      Physical Exam  Vitals and nursing note reviewed. Exam conducted with a chaperone present.   Constitutional:       General: She is not in acute distress.     Appearance: Normal appearance. She is not diaphoretic.   HENT:      Head: Atraumatic.      Mouth/Throat:      Pharynx: No oropharyngeal exudate.   Eyes:      General: No scleral icterus.     Pupils: Pupils are equal, round, and reactive to light.   Cardiovascular:      Rate and Rhythm: Normal rate and regular rhythm.      Heart sounds: Normal heart sounds.   Pulmonary:      Effort: No respiratory distress.      Breath sounds: Normal breath sounds.   Abdominal:      General: Bowel  sounds are normal.      Palpations: Abdomen is soft.      Tenderness: There is no abdominal tenderness.   Genitourinary:     General: Normal vulva.      Vagina: No vaginal discharge.   Musculoskeletal:         General: No tenderness.   Skin:     General: Skin is warm.      Findings: No rash.   Neurological:      General: No focal deficit present.      Mental Status: She is alert and oriented to person, place, and time.         ED Course        Procedures                No results found for this or any previous visit (from the past 24 hour(s)).  Medications - No data to display          Assessments & Plan (with Medical Decision Making)     21 year old female with recent IUD placement who presents with severe pelvic cramping requesting IUD removal.  IV established, labs drawn sent reviewed document epic with normal CBC electrolytes, lactic acid normal 0.7.  Patient sent to pelvic ultrasound which revealed IUD in normal position without evidence of perforation or infectious process.  Case discussed with GYN resident who deferred IUD removal to either ED provider during this encounter or close follow-up in GYN clinic for same.  I presented his options to the patient and she opted for IUD removal by ED provider.  Patient premedicated with Toradol 15 mg IV.  Pelvic exam performed with chaperone present, IUD string identified and retrieved with gentle traction using ring forceps.  Patient tolerated the procedure without any difficulties.    I have reviewed the nursing notes.    I have reviewed the findings, diagnosis, plan and need for follow up with the patient.    New Prescriptions    No medications on file       Final diagnoses:   Encounter for IUD removal       Antionette ORTIZ, am serving as a trained medical scribe to document services personally performed by Marcia Banks MD based on the provider's statements to me on July 24, 2021.  This document has been checked and approved by the attending provider.    DIANA  Marcia Banks MD, was physically present and have reviewed and verified the accuracy of this note documented by Antionette Hernandez, medical scribe.       Marcia Banks       7/24/2021   [facility] EMERGENCY DEPARTMENT     Marcia Banks MD  07/25/21 [id number]

## 2021-07-24 NOTE — TELEPHONE ENCOUNTER
Patient has been having horrible cramping for the past week since it was placed. Pain rating 10/10. The cramping comes and goes. When it is bad patient states she gets really hot, legs tingle and it will last for about 10-20 minutes. Patient treating with ibuprofen, tylenol and hot packs which don't seem to help. Patient has been spotting since the placement and once pasted a clot the size of a pea.   Denies severe pain now, constant pain over 2 hours.   Protocol recommends to see PCP within 24 hours. Patient agrees to go to  tomorrow.   Yesi Maguire RN   07/23/21 10:10 PM  [facility] Nurse Advisor    COVID 19 Nurse Triage Plan/Patient Instructions    Please be aware that novel coronavirus (COVID-19) may be circulating in the community. If you develop symptoms such as fever, cough, or SOB or if you have concerns about the presence of another infection including coronavirus (COVID-19), please contact your health care provider or visit [email or web address].     Disposition/Instructions    In-Person Visit with provider recommended. Reference Visit Selection Guide.    Thank you for taking steps to prevent the spread of this virus.  o Limit your contact with others.  o Wear a simple mask to cover your cough.  o Wash your hands well and often.    Resources    [facility]: About COVID-19: [email or web address]    CDC: What to Do If You're Sick: www.cdc.gov/coronavirus/2019-ncov/about/steps-when-sick.html    CDC: Ending Home Isolation: www.cdc.gov/coronavirus/2019-ncov/hcp/disposition-in-home-patients.html     CDC: Caring for Someone: www.cdc.gov/coronavirus/2019-ncov/if-you-are-sick/care-for-someone.html     [facility]: Interim Guidance for Hospital Discharge to Home: www.health.[facility].mn.us/diseases/coronavirus/hcp/hospdischarge.pdf    [facility] clinical trials (COVID-19 research studies): [email or web address].[email or web address]/n-clinical-trials     Below are the COVID-19 hotlines at the  [place] Department of Health ([facility]). Interpreters are available.   o For health questions: Call 857-729-1564 or 1-249.415.3936 (7 a.m. to 7 p.m.)  o For questions about schools and childcare: Call 360-649-6460 or 1-676.831.4349 (7 a.m. to 7 p.m.)   Reason for Disposition    [1] MILD abdominal pain (e.g., does not interfere with normal activities) AND [2] pain comes and goes (cramps) AND [3] present > 48 hours    Additional Information    Negative: Shock suspected (e.g., cold/pale/clammy skin, too weak to stand, low BP, rapid pulse)    Negative: Sounds like a life-threatening emergency to the triager    Negative: [1] Abdominal pain AND [2] pregnant < 20 weeks    Negative: [1] Vaginal bleeding AND [2] pregnant < 20 weeks    Negative: Vaginal discharge is main symptom    Negative: [1] SEVERE abdominal pain (e.g., excruciating) AND [2] present > 1 hour    Negative: [1] SEVERE vaginal bleeding (e.g., soaking 2 pads or tampons per hour) AND [2] present 2 or more hours    Negative: Patient sounds very sick or weak to the triager    Negative: MODERATE vaginal bleeding (i.e., soaking 1 pad or tampon per hour and present > 6 hours)    Negative: [1] Constant abdominal pain AND [2] present > 2 hours    Negative: [1] Caller has URGENT question AND [2] triager unable to answer question    Protocols used: CONTRACEPTION - IUD SYMPTOMS AND [id number]

## 2021-07-24 NOTE — ED TRIAGE NOTES
Patient had an IUD placed recently and has had severe abdominal cramping and passing of 2 clots since placement on Monday last week.

## 2021-08-03 ENCOUNTER — OFFICE VISIT (OUTPATIENT)
Dept: FAMILY MEDICINE | Facility: CLINIC | Age: 21
End: 2021-08-03
Payer: COMMERCIAL

## 2021-08-03 VITALS
HEIGHT: 66 IN | DIASTOLIC BLOOD PRESSURE: 70 MMHG | BODY MASS INDEX: 27.48 KG/M2 | RESPIRATION RATE: 16 BRPM | WEIGHT: 171 LBS | TEMPERATURE: 98 F | OXYGEN SATURATION: 99 % | HEART RATE: 75 BPM | SYSTOLIC BLOOD PRESSURE: 126 MMHG

## 2021-08-03 DIAGNOSIS — N83.201 CYST OF RIGHT OVARY: ICD-10-CM

## 2021-08-03 DIAGNOSIS — N94.6 DYSMENORRHEA: ICD-10-CM

## 2021-08-03 DIAGNOSIS — Z30.011 ENCOUNTER FOR INITIAL PRESCRIPTION OF CONTRACEPTIVE PILLS: Primary | ICD-10-CM

## 2021-08-03 PROCEDURE — 99214 OFFICE O/P EST MOD 30 MIN: CPT | Performed by: FAMILY MEDICINE

## 2021-08-03 RX ORDER — NORGESTIMATE AND ETHINYL ESTRADIOL 0.25-0.035
1 KIT ORAL DAILY
Qty: 90 TABLET | Refills: 3 | Status: SHIPPED | OUTPATIENT
Start: 2021-08-03 | End: 2022-03-07

## 2021-08-03 ASSESSMENT — MIFFLIN-ST. JEOR: SCORE: 1547.4

## 2021-08-03 ASSESSMENT — PAIN SCALES - GENERAL: PAINLEVEL: NO PAIN (0)

## 2021-08-03 NOTE — PROGRESS NOTES
"      ICD-10-CM    1. Encounter for initial prescription of contraceptive pills  Z30.011 norgestimate-ethinyl estradiol (ORTHO-CYCLEN) 0.25-35 MG-MCG tablet   2. Cyst of right ovary  N83.201 norgestimate-ethinyl estradiol (ORTHO-CYCLEN) 0.25-35 MG-MCG tablet     US Pelvic Complete with Transvaginal   3. Dysmenorrhea  N94.6      Ovarian cyst-complex appearance, repeat US in 4-6 weeks  Contraception-iud removed, she would like to use her old OCP, she tolerated it well, risks and benifits reviewed   No pelvic pain now      Subjective   Mireya is a 21 year old who presents for the following health issues  accompanied by her self:    HPI     ED/UC Followup:    Facility: [facility] Emergency Department   Date of visit: 7/24/21  Reason for visit: abdominal pain  Current Status: IUD removed   Ovarian Cyst found on imaging           Review of Systems   Constitutional, HEENT, cardiovascular, pulmonary, GI, , musculoskeletal, neuro, skin, endocrine and psych systems are negative, except as otherwise noted.      Objective    /70   Pulse 75   Temp 98  F (36.7  C) (Oral)   Resp 16   Ht 1.676 m (5' 6\")   Wt 77.6 kg (171 lb)   SpO2 99%   BMI 27.60 kg/m    Body mass index is 27.6 kg/m .  Physical Exam   GENERAL: healthy, alert and no distress  NECK: no adenopathy, no asymmetry, masses, or scars and thyroid normal to palpation  RESP: lungs clear to auscultation - no rales, rhonchi or wheezes  CV: regular rate and rhythm, normal S1 S2, no S3 or S4, no murmur, click or rub, no peripheral edema and peripheral pulses strong  ABDOMEN: soft, nontender, no hepatosplenomegaly, no masses and bowel sounds normal  MS: no gross musculoskeletal defects noted, no edema                "

## 2021-08-03 NOTE — PATIENT INSTRUCTIONS
Start ocp  Get Ultrasound  in 4-6 weeks to recheck ovarian cyst  Cheli Ruano D.O.      Patient Education

## 2021-09-18 ENCOUNTER — HEALTH MAINTENANCE LETTER (OUTPATIENT)
Age: 21
End: 2021-09-18

## 2021-11-22 ENCOUNTER — TELEPHONE (OUTPATIENT)
Dept: FAMILY MEDICINE | Facility: CLINIC | Age: 21
End: 2021-11-22
Payer: COMMERCIAL

## 2021-11-22 NOTE — TELEPHONE ENCOUNTER
Patient Quality Outreach    Patient is due for the following:   Physical  - Due after 8/6/21  Chlamydia    NEXT STEPS:   Schedule a yearly physical    Type of outreach:    Sent [employer] message.      Questions for provider review:    None     Lynn Gardner CMA

## 2021-12-23 ENCOUNTER — VIRTUAL VISIT (OUTPATIENT)
Dept: FAMILY MEDICINE | Facility: CLINIC | Age: 21
End: 2021-12-23
Payer: COMMERCIAL

## 2021-12-23 DIAGNOSIS — F41.1 GAD (GENERALIZED ANXIETY DISORDER): ICD-10-CM

## 2021-12-23 DIAGNOSIS — N94.6 DYSMENORRHEA: Primary | ICD-10-CM

## 2021-12-23 DIAGNOSIS — D50.9 IRON DEFICIENCY ANEMIA, UNSPECIFIED IRON DEFICIENCY ANEMIA TYPE: ICD-10-CM

## 2021-12-23 PROCEDURE — 99213 OFFICE O/P EST LOW 20 MIN: CPT | Mod: 95 | Performed by: FAMILY MEDICINE

## 2021-12-23 RX ORDER — SERTRALINE HYDROCHLORIDE 100 MG/1
100 TABLET, FILM COATED ORAL DAILY
Qty: 90 TABLET | Refills: 2 | Status: SHIPPED | OUTPATIENT
Start: 2021-12-23 | End: 2022-03-07

## 2021-12-23 NOTE — PROGRESS NOTES
Mireya is a 21 year old who is being evaluated via a billable video visit.      How would you like to obtain your AVS? MyChart  If the video visit is dropped, the invitation should be resent by: Text to cell phone: [phone]  Will anyone else be joining your video visit? No      Video Start Time: 2:15 PM      ICD-10-CM    1. Dysmenorrhea  N94.6    2. FIGUEROA (generalized anxiety disorder)  F41.1 sertraline (ZOLOFT) 100 MG tablet   3. Iron deficiency anemia, unspecified iron deficiency anemia type  D50.9        Figueroa-better with 100mg of zoloft  She is looking into therapy, she would like to wean down to 75mg if doing better with therapy, she will make an appoin for physical in June, wants to make sure she can send me a message if needing 25mg dose of zoloft prior to seeing me if needed .  Advised sending me a message    History of heavy periods, had not tolerated iud or nexpalanon , now on iron and ocp, no sx of anmeia, recheck at the next visit      Subjective   Mireya is a 21 year old who presents for the following health issues  accompanied by her self.    HPI     Anxiety Follow-Up    How are you doing with your anxiety since your last visit? No change    Are you having other symptoms that might be associated with anxiety? No    Have you had a significant life event? No     Are you feeling depressed? No    Do you have any concerns with your use of alcohol or other drugs? No  zoloft taking daily , no issues  Sleeping well, no side effects    Finding a therapist    Job is extremely stressful, has a new place, first time she has been on her own    ocp doing ok, period not as heavy    Anemia-iron tabs       Social History     Tobacco Use     Smoking status: Never Smoker     Smokeless tobacco: Never Used   Vaping Use     Vaping Use: Never used   Substance Use Topics     Alcohol use: Yes     Alcohol/week: 0.0 standard drinks     Comment: seldom/rare     Drug use: No     FIGUEROA-7 SCORE 12/18/2018 5/4/2021 12/23/2021   Total Score  - 4 (minimal anxiety) 4 (minimal anxiety)   Total Score 16 4 4     PHQ 5/4/2021   PHQ-9 Total Score 4   Q9: Thoughts of better off dead/self-harm past 2 weeks Not at all     MARY-7  12/23/2021   1. Feeling nervous, anxious, or on edge 1   2. Not being able to stop or control worrying 0   3. Worrying too much about different things 1   4. Trouble relaxing 0   5. Being so restless that it is hard to sit still 0   6. Becoming easily annoyed or irritable 1   7. Feeling afraid, as if something awful might happen 1   MARY-7 Total Score 4   If you checked any problems, how difficult have they made it for you to do your work, take care of things at home, or get along with other people? -             Review of Systems   Constitutional, HEENT, cardiovascular, pulmonary, GI, , musculoskeletal, neuro, skin, endocrine and psych systems are negative, except as otherwise noted.      Objective           Vitals:  No vitals were obtained today due to virtual visit.    Physical Exam   GENERAL: Healthy, alert and no distress  EYES: Eyes grossly normal to inspection.  No discharge or erythema, or obvious scleral/conjunctival abnormalities.  RESP: No audible wheeze, cough, or visible cyanosis.  No visible retractions or increased work of breathing.    SKIN: Visible skin clear. No significant rash, abnormal pigmentation or lesions.  NEURO: Cranial nerves grossly intact.  Mentation and speech appropriate for age.  PSYCH: Mentation appears normal, affect normal/bright, judgement and insight intact, normal speech and appearance well-groomed.                Video-Visit Details    Type of service:  Video Visit    Video End Time:2:32 PM    Originating Location (pt. Location): Home    Distant Location (provider location):  [facility]     Platform used for Video Visit: [employer]

## 2022-01-30 ENCOUNTER — OFFICE VISIT (OUTPATIENT)
Dept: URGENT CARE | Facility: URGENT CARE | Age: 22
End: 2022-01-30
Payer: COMMERCIAL

## 2022-01-30 ENCOUNTER — ANCILLARY PROCEDURE (OUTPATIENT)
Dept: GENERAL RADIOLOGY | Facility: CLINIC | Age: 22
End: 2022-01-30
Attending: INTERNAL MEDICINE
Payer: COMMERCIAL

## 2022-01-30 VITALS
TEMPERATURE: 99.2 F | OXYGEN SATURATION: 99 % | SYSTOLIC BLOOD PRESSURE: 155 MMHG | DIASTOLIC BLOOD PRESSURE: 95 MMHG | HEART RATE: 82 BPM | RESPIRATION RATE: 16 BRPM

## 2022-01-30 DIAGNOSIS — R07.89 OTHER CHEST PAIN: Primary | ICD-10-CM

## 2022-01-30 DIAGNOSIS — R07.89 OTHER CHEST PAIN: ICD-10-CM

## 2022-01-30 PROCEDURE — 99213 OFFICE O/P EST LOW 20 MIN: CPT | Performed by: INTERNAL MEDICINE

## 2022-01-30 PROCEDURE — 71046 X-RAY EXAM CHEST 2 VIEWS: CPT | Performed by: RADIOLOGY

## 2022-01-30 RX ORDER — NAPROXEN 500 MG/1
500 TABLET ORAL 2 TIMES DAILY WITH MEALS
Qty: 60 TABLET | Refills: 0 | Status: SHIPPED | OUTPATIENT
Start: 2022-01-30 | End: 2022-02-08

## 2022-01-30 NOTE — PROGRESS NOTES
"  Assessment & Plan     Other chest pain  Suspect that she has two current sources of pain including a slipped rib anteriorly and typical myofascial pain in the thoracic paraspinals posteriorly.  Reassurance and symptomatic relief.  - XR Chest 2 Views; Future  - naproxen (NAPROSYN) 500 MG tablet; Take 1 tablet (500 mg) by mouth 2 times daily (with meals)    Angel Garcia MD  [facility]    Subjective     HPI   Complaining of back pain.  This started two weeks ago and was primarily in the left scapular region.  This has been persistent and feels like it's \"getting deeper.\"  Now she is noting that there is a new pain in the left lateral chest.  The pain is worse with laughing, movement.  Feels that there is some visible swelling in the rib cage.  The pain is a steady ache but will twinge into high levels of pain with specific movements.  Denies fevers, chills, sweats.  No unexpected weight loss.  Appetite has been normal.  Denies eye irritation, oral ulcerations, skin rashes, other joint swelling of the extremities, changes in bowel habits, hematuria, frothy urine.  Denies family history of inflammatory arthriitis.      Review of Systems   Constitutional, HEENT, cardiovascular, pulmonary, gi and gu systems are negative, except as otherwise noted.      Objective    BP (!) 155/95   Pulse 82   Temp 99.2  F (37.3  C) (Tympanic)   Resp 16   SpO2 99%   There is no height or weight on file to calculate BMI.  Physical Exam   GENERAL APPEARANCE: healthy, alert and no distress  HENT: ear canals and TM's normal and nose and mouth without ulcers or lesions  NECK: no adenopathy, no asymmetry, masses, or scars and thyroid normal to palpation  RESP: lungs clear to auscultation - no rales, rhonchi or wheezes  CV: regular rates and rhythm, normal S1 S2, no S3 or S4 and no murmur, click or rub  ABDOMEN: soft, nontender, without hepatosplenomegaly or masses and bowel sounds normal  MS: highly focal " tenderness involving the lower thoracic paraspinal muscles with increase in pain on thoracic rotation; highly focal tenderness over the left costal cartilage with swelling in the left costal cartilage    CXR - Reviewed and interpreted by me Normal- no infiltrates, effusions, pneumothoraces, cardiomegaly or masses

## 2022-01-30 NOTE — PATIENT INSTRUCTIONS
X-ray is perfectly clear which is good news.    This could be a slipped rib in the chest with coincidental muscle strain in the back.  Right now, I don't see a clear linkage between the chest and the back.

## 2022-02-08 ENCOUNTER — E-VISIT (OUTPATIENT)
Dept: FAMILY MEDICINE | Facility: CLINIC | Age: 22
End: 2022-02-08
Payer: COMMERCIAL

## 2022-02-08 DIAGNOSIS — M54.6 ACUTE BILATERAL THORACIC BACK PAIN: Primary | ICD-10-CM

## 2022-02-08 PROCEDURE — 99421 OL DIG E/M SVC 5-10 MIN: CPT | Performed by: FAMILY MEDICINE

## 2022-02-08 RX ORDER — CYCLOBENZAPRINE HCL 10 MG
10 TABLET ORAL 3 TIMES DAILY PRN
Qty: 30 TABLET | Refills: 0 | Status: SHIPPED | OUTPATIENT
Start: 2022-02-08 | End: 2022-02-10

## 2022-02-08 NOTE — PATIENT INSTRUCTIONS
Caring for Your Back    You are not alone.    Low back pain is very common. Nearly half of all adults will have low back pain in any given year. The good news is that back pain is rarely a danger to your health. Most people can manage their back pain on their own. About half of people start feeling better within two weeks. In 9 out of 10 cases, low back pain goes away or no longer limits daily activity within 6 weeks.     Your outlook is good!     Your symptoms tell us that your low back pain is most likely not a danger to you. Most of the time we will not know the exact cause of low back pain, even if you see a doctor or have an MRI. However, treatment can still work without knowing the cause of the pain. Less than 1 in 100 people need surgery for their back pain.     What can I do about my low back pain?     There are three basic things you can do to ease low back pain and help it go away.     Use heat or cold packs.    Take medicine as directed.    Use positions, movements and exercises.    Using heat or cold packs:    Try cold packs or gentle heat to ease your pain.  Use whichever gives you the most relief. Apply the cold pack or heat for 15 minutes at a time, as often as needed.    Taking medicine:    If taking over-the-counter medicine:    Take ibuprofen (Advil, Motrin) 600 mg three times a day as needed for pain.  OR    Take Aleve (naproxen) 220 to 440 mg two times a day as needed for pain. If your doctor prescribed a muscle relaxant (cyclobenzaprine 10 mg.):    Take   to 1 tablet at bedtime.    Do not drive when taking this medicine. This drug may make you sleepy.     Using positions, movements and exercises:    Research tells us that moving your joints and muscles can help you recover from back pain. Such activity should be simple and gentle. Use the positions below as well as walking to help relieve your pain. Try taking a short walk every 3 to 4 hours during the day. Walk for a few minutes inside your  home or take longer walks outside, on a treadmill or at a mall. Slowly increase the amount of time you walk. Expect discomfort when you begin, but it should lessen as your back starts to heal. When your back feels better, walk daily to keep your back and body healthy.    Finding a position that is comfortable:    When your back pain is new, certain positions will ease your pain. Gently try each of the positions below until you find one that is helpful. Once you find a position of comfort, use it as often as you like when you are resting. You will recover faster if you combine rest with activity.    * Lie on your back with your legs bent. You can do this by placing a pillow under your knees or lie on the floor and rest your lower legs on the seat of a chair.  * Lie on your side with your knees bent and place a pillow between your knees.    Lie on your stomach over pillows.       When should I call my doctor?    Your back pain should improve over the first couple of weeks. As it improves, you should be able to return to your normal activities.  But call your doctor if:      You have a sudden change in your ability to control your bladder or bowels.    You begin to feel tingling in your groin or legs.    The pain spreads down your leg and into your foot.    Your toes, feet or leg muscles begin to feel weak.    You feel generally unwell or sick.    Your pain gets worse.    If you are deaf or hard of hearing, please let us know. We provide many free services including sign language interpreters, oral interpreters, TTYs, telephone amplifiers, note takers and written materials.    For informational purposes only. Not to replace the advice of your health care provider. Copyright   2013 [facility]. All rights reserved. YEOXIN VMall [id number] - 04/13.

## 2022-02-10 RX ORDER — CYCLOBENZAPRINE HCL 10 MG
10 TABLET ORAL 3 TIMES DAILY PRN
Qty: 30 TABLET | Refills: 0 | Status: SHIPPED | OUTPATIENT
Start: 2022-02-10 | End: 2022-05-04

## 2022-02-16 ENCOUNTER — THERAPY VISIT (OUTPATIENT)
Dept: PHYSICAL THERAPY | Facility: CLINIC | Age: 22
End: 2022-02-16
Attending: FAMILY MEDICINE
Payer: COMMERCIAL

## 2022-02-16 DIAGNOSIS — M54.6 ACUTE BILATERAL THORACIC BACK PAIN: ICD-10-CM

## 2022-02-16 DIAGNOSIS — M54.6 PAIN IN THORACIC SPINE: ICD-10-CM

## 2022-02-16 DIAGNOSIS — M99.18 SUBLUXATION COMPLEX (VERTEBRAL) OF RIB CAGE: ICD-10-CM

## 2022-02-16 PROCEDURE — 97161 PT EVAL LOW COMPLEX 20 MIN: CPT | Mod: GP | Performed by: PHYSICAL THERAPIST

## 2022-02-16 PROCEDURE — 97140 MANUAL THERAPY 1/> REGIONS: CPT | Mod: GP | Performed by: PHYSICAL THERAPIST

## 2022-02-16 PROCEDURE — 97110 THERAPEUTIC EXERCISES: CPT | Mod: GP | Performed by: PHYSICAL THERAPIST

## 2022-02-16 NOTE — PROGRESS NOTES
Physical Therapy Initial Evaluation  Subjective:  The history is provided by the patient. No  was used.   Patient Health History  Mireya Yoon being seen for Back/rib pain.     Problem began: 1/27/2022.   Problem occurred: Unknown   Pain is reported as 7/10 on pain scale.  General health as reported by patient is good.  Pertinent medical history includes: anemia and mental illness.     Medical allergies: none.   Surgeries include:  None.    Current medications:  Anti-depressants, anti-inflammatory and muscle relaxants.    Current occupation is Psychiatric Technician.   Primary job tasks include:  Computer work, lifting/carrying, prolonged sitting and repetitive tasks.                  Therapist Generated HPI Evaluation  Problem details: Gradual onset of L low thoracic pain with radiation to L lower ribs anterior. MD 2/8/22. Did have pain with deep breaths but this has improved. Pain worse with any movement to this area. Is presently seeing chiropractor .         Type of problem:  Thoracic spine.    This is a new condition.  Condition occurred with:  Insidious onset.  Where condition occurred: for unknown reasons.  Patient reports pain:  Lower thoracic.  Pain is described as aching and sharp   Radiates to: L anterior ribs. Pain is the same all the time.  Since onset symptoms are unchanged.  Associated symptoms:  Loss of motion/stiffness. Symptoms are exacerbated by certain positions, sitting and lifting  and relieved by rest and ice.    Previous treatment includes chiropractic. There was none improvement following previous treatment.  Restrictions due to condition include:  Working in normal job without restrictions.  Barriers include:  None as reported by patient.                        Objective:  Standing Alignment:        Lumbar:  Anterior pelvic tilt and lordosis incr  Pelvic:  PSIS high R          Gait:    Gait Type:  Normal   Assistive Devices:  None                 Lumbar/SI  Evaluation      Lumbar DTR's:  normal          Neural Tension/Mobility:  Lumbar:  Normal                SI joint/Sacrum:          Left negative at:    Thigh thrust    Right negative at:  Thigh thrust         Cervical/Thoracic Evaluation    AROM:    AROM Thoracic:    Flexion:               100% pain back side  Extension:          100% pain front and back  Rotation:            Left: 100% pain L pinch     Right: 100% pain worse than L      Headaches: none  Cervical Myotomes:  normal                        Cervical Palpation:    Tenderness present at Left:    Erector Spinae                                                    General     ROS    Assessment/Plan:    Patient is a 21 year old female with thoracic complaints.    Patient has the following significant findings with corresponding treatment plan.                Diagnosis 1:  L low thoracic and rib pain  Pain -  hot/cold therapy, manual therapy, self management, education and home program  Impaired muscle performance - neuro re-education  Decreased function - therapeutic activities    Therapy Evaluation Codes:   1) History comprised of:   Personal factors that impact the plan of care:      None.    Comorbidity factors that impact the plan of care are:      None.     Medications impacting care: Muscle relaxant.  2) Examination of Body Systems comprised of:   Body structures and functions that impact the plan of care:      Thoracic Spine.   Activity limitations that impact the plan of care are:      Driving, Dressing and Sports.  3) Clinical presentation characteristics are:   Stable/Uncomplicated.  4) Decision-Making    Low complexity using standardized patient assessment instrument and/or measureable assessment of functional outcome.  Cumulative Therapy Evaluation is: Low complexity.    Previous and current functional limitations:  (See Goal Flow Sheet for this information)    Short term and Long term goals: (See Goal Flow Sheet for this information)      Communication ability:  Patient appears to be able to clearly communicate and understand verbal and written communication and follow directions correctly.  Treatment Explanation - The following has been discussed with the patient:   RX ordered/plan of care  Anticipated outcomes  Possible risks and side effects  This patient would benefit from PT intervention to resume normal activities.   Rehab potential is excellent.    Frequency:  3 X a month, once daily  Duration:  for 3 months  Discharge Plan:  Achieve all LTG.  Independent in home treatment program.  Reach maximal therapeutic benefit.    Please refer to the daily flowsheet for treatment today, total treatment time and time spent performing 1:1 timed codes.

## 2022-02-21 ENCOUNTER — TELEPHONE (OUTPATIENT)
Dept: FAMILY MEDICINE | Facility: CLINIC | Age: 22
End: 2022-02-21
Payer: COMMERCIAL

## 2022-02-21 DIAGNOSIS — F41.1 GAD (GENERALIZED ANXIETY DISORDER): ICD-10-CM

## 2022-02-21 NOTE — TELEPHONE ENCOUNTER
[facility] message below.    [facility] both [facility]ills to [facility].    When patient is needing refills, she should call [phone].    Called patient with above information, and she verbalized understanding.    Updated preferred pharmacy to show [facility].    Ralph Banks RN

## 2022-02-21 NOTE — TELEPHONE ENCOUNTER
Pt is calling, wanting her rx switched from [facility] to [facility].     She has tried a number of times to have [place] pull the rx from [facility], but the wait times have been too long at [facility] and patient preferred pharmacy is unable to grab the refills.     Pt is wondering if we could send a new rx to [facility] pharmacy for her  sertraline (ZOLOFT) 100 MG tablet  norgestimate-ethinyl estradiol (ORTHO-CYCLEN) 0.25-35 MG-MCG tablet    Pt does NOT need these refilled now, she is just working ahead.     Call patient when rx is transferred if able     Kasia Spicer/  Colby Reyna

## 2022-02-23 ENCOUNTER — THERAPY VISIT (OUTPATIENT)
Dept: PHYSICAL THERAPY | Facility: CLINIC | Age: 22
End: 2022-02-23
Payer: COMMERCIAL

## 2022-02-23 DIAGNOSIS — M99.18 SUBLUXATION COMPLEX (VERTEBRAL) OF RIB CAGE: ICD-10-CM

## 2022-02-23 DIAGNOSIS — M54.6 PAIN IN THORACIC SPINE: ICD-10-CM

## 2022-02-23 PROCEDURE — 97110 THERAPEUTIC EXERCISES: CPT | Mod: GP | Performed by: PHYSICAL THERAPIST

## 2022-03-07 ENCOUNTER — VIRTUAL VISIT (OUTPATIENT)
Dept: FAMILY MEDICINE | Facility: CLINIC | Age: 22
End: 2022-03-07
Payer: COMMERCIAL

## 2022-03-07 ENCOUNTER — MYC MEDICAL ADVICE (OUTPATIENT)
Dept: FAMILY MEDICINE | Facility: CLINIC | Age: 22
End: 2022-03-07

## 2022-03-07 DIAGNOSIS — N83.201 CYST OF RIGHT OVARY: ICD-10-CM

## 2022-03-07 DIAGNOSIS — F41.1 GAD (GENERALIZED ANXIETY DISORDER): ICD-10-CM

## 2022-03-07 DIAGNOSIS — Z30.011 ENCOUNTER FOR INITIAL PRESCRIPTION OF CONTRACEPTIVE PILLS: ICD-10-CM

## 2022-03-07 PROCEDURE — 99213 OFFICE O/P EST LOW 20 MIN: CPT | Mod: 95 | Performed by: FAMILY MEDICINE

## 2022-03-07 RX ORDER — SERTRALINE HYDROCHLORIDE 100 MG/1
100 TABLET, FILM COATED ORAL DAILY
Qty: 90 TABLET | Refills: 2 | Status: SHIPPED | OUTPATIENT
Start: 2022-03-07 | End: 2023-03-14

## 2022-03-07 RX ORDER — NORGESTIMATE AND ETHINYL ESTRADIOL 0.25-0.035
1 KIT ORAL DAILY
Qty: 90 TABLET | Refills: 3 | Status: SHIPPED | OUTPATIENT
Start: 2022-03-07 | End: 2022-08-16

## 2022-03-07 NOTE — LETTER
3/7/2022        RE: Mireya Yoon  [address] [place] [address]    To whom it may concern,    Mireya Yoon  is under my professional care for treatment of depression and anxiety.  She reports that when she was living with her cat her symptoms were much better than now that she is not living with her cat.   Due to these mental illnesses, I have recommended that she have an emotional support animal to assist with any functional, social, or emotional limitations. Please consider her having a cat to be an emotional support animal.    Sincerely,       Cheli Ruano D.O.

## 2022-03-07 NOTE — PROGRESS NOTES
"Mireya is a 21 year old who is being evaluated via a billable video visit.      How would you like to obtain your AVS? MyChart  If the video visit is dropped, the invitation should be resent by: Text to cell phone: [phone]  Will anyone else be joining your video visit? No    Video Start Time: 10:22 AM    Assessment & Plan       ICD-10-CM    1. FIGUEROA (generalized anxiety disorder)  F41.1 sertraline (ZOLOFT) 100 MG tablet   2. Cyst of right ovary  N83.201 norgestimate-ethinyl estradiol (ORTHO-CYCLEN) 0.25-35 MG-MCG tablet   3. Encounter for initial prescription of contraceptive pills  Z30.011 norgestimate-ethinyl estradiol (ORTHO-CYCLEN) 0.25-35 MG-MCG tablet     Figueroa-she is doing ok, per patient she was living with a cat and a year ago moved to apt that does not take a cat, she feels worse due to not having any interaction with her cat, her mom has the cat and now mom may give her up.  She is asking for a letter for the apt to have her be allowed to keep her cat.  Letter done, continue med, follow up with therapy as discussed  ocp-doing well, cont med    Follow up in June for physical,           BMI:   Estimated body mass index is 27.6 kg/m  as calculated from the following:    Height as of 8/3/21: 1.676 m (5' 6\").    Weight as of 8/3/21: 77.6 kg (171 lb).       See Patient Instructions    No follow-ups on file.    DO KEL Correa [facility]    Subjective   Mireya is a 21 year old who presents for the following health issues  accompanied by her self.    HPI     Wants letter for emotional support animal.   She does not currently see therapist    Depression and Anxiety Follow-Up    How are you doing with your depression since your last visit? No change    How are you doing with your anxiety since your last visit?  No change    Are you having other symptoms that might be associated with depression or anxiety? No    Have you had a significant life event? No     Do you have any " concerns with your use of alcohol or other drugs? No  She had a therapist int he past and now does not have a therapist    Living alone, she has a fear of interacting with others    Back pain resolved     Social History     Tobacco Use     Smoking status: Never Smoker     Smokeless tobacco: Never Used   Vaping Use     Vaping Use: Never used   Substance Use Topics     Alcohol use: Yes     Alcohol/week: 0.0 standard drinks     Comment: seldom/rare     Drug use: No     PHQ 5/4/2021   PHQ-9 Total Score 4   Q9: Thoughts of better off dead/self-harm past 2 weeks Not at all     MARY-7 SCORE 12/18/2018 5/4/2021 12/23/2021   Total Score - 4 (minimal anxiety) 4 (minimal anxiety)   Total Score 16 4 4     Last PHQ-9 5/4/2021   1.  Little interest or pleasure in doing things 0   2.  Feeling down, depressed, or hopeless 0   3.  Trouble falling or staying asleep, or sleeping too much 2   4.  Feeling tired or having little energy 2   5.  Poor appetite or overeating 0   6.  Feeling bad about yourself 0   7.  Trouble concentrating 0   8.  Moving slowly or restless 0   Q9: Thoughts of better off dead/self-harm past 2 weeks 0   PHQ-9 Total Score 4     MARY-7  12/23/2021   1. Feeling nervous, anxious, or on edge 1   2. Not being able to stop or control worrying 0   3. Worrying too much about different things 1   4. Trouble relaxing 0   5. Being so restless that it is hard to sit still 0   6. Becoming easily annoyed or irritable 1   7. Feeling afraid, as if something awful might happen 1   MARY-7 Total Score 4   If you checked any problems, how difficult have they made it for you to do your work, take care of things at home, or get along with other people? -       Suicide Assessment Five-step Evaluation and Treatment (SAFE-T)        Review of Systems   Constitutional, HEENT, cardiovascular, pulmonary, GI, , musculoskeletal, neuro, skin, endocrine and psych systems are negative, except as otherwise noted.      Objective            Vitals:  No vitals were obtained today due to virtual visit.    Physical Exam   GENERAL: Healthy, alert and no distress  EYES: Eyes grossly normal to inspection.  No discharge or erythema, or obvious scleral/conjunctival abnormalities.  RESP: No audible wheeze, cough, or visible cyanosis.  No visible retractions or increased work of breathing.    SKIN: Visible skin clear. No significant rash, abnormal pigmentation or lesions.  NEURO: Cranial nerves grossly intact.  Mentation and speech appropriate for age.  PSYCH: Mentation appears normal, affect normal/bright, judgement and insight intact, normal speech and appearance well-groomed.                Video-Visit Details    Type of service:  Video Visit    Video End Time:10:39 AM    Originating Location (pt. Location): Home    Distant Location (provider location):  [facility]     Platform used for Video Visit: [employer]

## 2022-03-07 NOTE — TELEPHONE ENCOUNTER
I would need to discuss with patient, is he in theray as well?  Please set he up for virtual visit  Thanks  Cheli Ruano D.O.

## 2022-03-07 NOTE — TELEPHONE ENCOUNTER
Please assist pt with virtual visit with PCP for:    Discuss letter for emotion support animal.      Carmen Llanes RN

## 2022-03-07 NOTE — TELEPHONE ENCOUNTER
Routing to PCP      Do you write letters for emotional support animals- do you need apt for this??      Carmen Llanes RN

## 2022-04-07 NOTE — PROGRESS NOTES
DISCHARGE SUMMARY    Mireya Yoon was seen 2 times for evaluation and treatment.  Patient did not return for further treatment and current status is unknown.  Due to short treatment duration, no objective or functional changes were made.  Please see goal flow sheet from episode noted date below and initial evaluation for further information.  Patient is discharged from therapy and therapy episode is resolved as of 04/07/22.      Linked Episodes   Type: Episode: Status: Noted: Resolved: Last update: Updated by:   PHYSICAL THERAPY Back pain L ribs [id number] Active 2/16/2022 2/23/2022 12:12 PM Poornima Pt, Geo PT      Comments:

## 2022-04-24 ENCOUNTER — E-VISIT (OUTPATIENT)
Dept: URGENT CARE | Facility: CLINIC | Age: 22
End: 2022-04-24
Payer: COMMERCIAL

## 2022-04-24 DIAGNOSIS — J01.90 ACUTE SINUSITIS WITH SYMPTOMS > 10 DAYS: Primary | ICD-10-CM

## 2022-04-24 PROCEDURE — 99421 OL DIG E/M SVC 5-10 MIN: CPT | Performed by: PHYSICIAN ASSISTANT

## 2022-04-24 NOTE — PATIENT INSTRUCTIONS
Dear Mireya Yoon    After reviewing your responses, I've been able to diagnose you with?a sinus infection caused by bacteria.?     Based on your responses and diagnosis, I have prescribed Augmentin to treat your symptoms. I have sent this to your pharmacy.?     It is also important to stay well hydrated, get lots of rest and take over-the-counter decongestants,?tylenol?or ibuprofen if you?are able to?take those medications per your primary care provider to help relieve discomfort.?     It is important that you take?all of?your prescribed medication even if your symptoms are improving after a few doses.? Taking?all of?your medicine helps prevent the symptoms from returning.?     If your symptoms worsen, you develop severe headache, vomiting, high fever (>102), or are not improving in 7 days, please contact your primary care provider for an appointment or visit any of our convenient Walk-in Care or Urgent Care Centers to be seen which can be found on our website?here.?     Thanks again for choosing?us?as your health care partner,?   ?  Rowan Hammond PA-C?

## 2022-05-04 ENCOUNTER — OFFICE VISIT (OUTPATIENT)
Dept: FAMILY MEDICINE | Facility: CLINIC | Age: 22
End: 2022-05-04
Payer: COMMERCIAL

## 2022-05-04 ENCOUNTER — TELEPHONE (OUTPATIENT)
Dept: FAMILY MEDICINE | Facility: CLINIC | Age: 22
End: 2022-05-04
Payer: COMMERCIAL

## 2022-05-04 VITALS
WEIGHT: 184 LBS | BODY MASS INDEX: 29.57 KG/M2 | HEIGHT: 66 IN | TEMPERATURE: 98.1 F | OXYGEN SATURATION: 100 % | RESPIRATION RATE: 16 BRPM | DIASTOLIC BLOOD PRESSURE: 76 MMHG | HEART RATE: 70 BPM | SYSTOLIC BLOOD PRESSURE: 130 MMHG

## 2022-05-04 DIAGNOSIS — R51.9 FACIAL PAIN: ICD-10-CM

## 2022-05-04 DIAGNOSIS — J32.0 MAXILLARY SINUSITIS, UNSPECIFIED CHRONICITY: Primary | ICD-10-CM

## 2022-05-04 DIAGNOSIS — R09.81 NASAL CONGESTION: ICD-10-CM

## 2022-05-04 PROCEDURE — 99214 OFFICE O/P EST MOD 30 MIN: CPT | Performed by: FAMILY MEDICINE

## 2022-05-04 RX ORDER — FLUTICASONE PROPIONATE 50 MCG
1 SPRAY, SUSPENSION (ML) NASAL DAILY
Qty: 16 G | Refills: 1 | Status: SHIPPED | OUTPATIENT
Start: 2022-05-04 | End: 2022-08-16

## 2022-05-04 RX ORDER — METHYLPREDNISOLONE 4 MG
TABLET, DOSE PACK ORAL
Qty: 21 TABLET | Refills: 0 | Status: SHIPPED | OUTPATIENT
Start: 2022-05-04 | End: 2022-08-16

## 2022-05-04 RX ORDER — IPRATROPIUM BROMIDE 21 UG/1
2 SPRAY, METERED NASAL EVERY 12 HOURS
Qty: 30 ML | Refills: 0 | Status: SHIPPED | OUTPATIENT
Start: 2022-05-04 | End: 2022-08-16

## 2022-05-04 RX ORDER — AZITHROMYCIN 250 MG/1
TABLET, FILM COATED ORAL
Qty: 6 TABLET | Refills: 0 | Status: SHIPPED | OUTPATIENT
Start: 2022-05-04 | End: 2022-05-09

## 2022-05-04 RX ORDER — FEXOFENADINE HCL AND PSEUDOEPHEDRINE HCI 60; 120 MG/1; MG/1
1 TABLET, EXTENDED RELEASE ORAL 2 TIMES DAILY
Qty: 14 TABLET | Refills: 0 | Status: SHIPPED | OUTPATIENT
Start: 2022-05-04 | End: 2022-05-11

## 2022-05-04 ASSESSMENT — PAIN SCALES - GENERAL: PAINLEVEL: NO PAIN (0)

## 2022-05-04 NOTE — PROGRESS NOTES
ICD-10-CM    1. Maxillary sinusitis, unspecified chronicity  J32.0 methylPREDNISolone (MEDROL DOSEPAK) 4 MG tablet therapy pack     ipratropium (ATROVENT) 0.03 % nasal spray     fluticasone (FLONASE) 50 MCG/ACT nasal spray     fexofenadine-pseudoePHEDrine (ALLEGRA-D)  MG 12 hr tablet     azithromycin (ZITHROMAX) 250 MG tablet   2. Nasal congestion  R09.81 azithromycin (ZITHROMAX) 250 MG tablet   3. Facial pain  R51.9      Patient with history of allergies and nasal drainage, here with weeks of sinus pressure, drainage, no fever,no real headache-recent Augmentin use which helped her symptoms.   -lots of drainage on exam and blockage of left nasal turbinate-advised Atrovent /flonase , allegra  If not better consider prednisone and zpak  -if not better consider ENT referral and CT scan    Subjective   Mireya is a 21 year old who presents for the following health issues  accompanied by her self.    History of Present Illness       Reason for visit:  Sinus problems  Symptom onset:  3-4 weeks ago  Symptoms include:  Clogged painful nose, face and head pressure, post nasal drip, green mucus  Symptom intensity:  Severe  Symptom progression:  Staying the same  Had these symptoms before:  Yes  Has tried/received treatment for these symptoms:  Yes  Previous treatment was successful:  No  What makes it worse:  No  What makes it better:  No    She eats 2-3 servings of fruits and vegetables daily.She consumes 0 sweetened beverage(s) daily.She exercises with enough effort to increase her heart rate 30 to 60 minutes per day.  She exercises with enough effort to increase her heart rate 3 or less days per week.   She is taking medications regularly.       Seen 4/24 at urgent care, via E-visit.   Last visit with Dr. Ruano was VIRTUAL 3/7/22.     Concern - sinus issues  Onset: ongoing issues, seen multiple times and treated multiple times with antibiotics. Last round was finished three day ago and patient is not better.  "  Description:   post nasal drip  Green mucous   Cough that is productive.   Got better while taking antibitioc and came back when completed.   Intensity: mild, moderate  Progression of Symptoms:  No better    Pain in the nasal area, puffiness of eyes, headaches    History of sessions bill  She has tried loratadine, nutty pot, flonase, zycam    Augmentin for 7 days  Normally hard to breath from nose  Some snoring at night           Review of Systems   Constitutional, HEENT, cardiovascular, pulmonary, GI, , musculoskeletal, neuro, skin, endocrine and psych systems are negative, except as otherwise noted.      Objective    /76   Pulse 70   Temp 98.1  F (36.7  C) (Oral)   Resp 16   Ht 1.676 m (5' 6\")   Wt 83.5 kg (184 lb)   SpO2 100%   BMI 29.70 kg/m    Body mass index is 29.7 kg/m .  Physical Exam   GENERAL: healthy, alert and no distress  EYES: Eyes grossly normal to inspection, PERRL and conjunctivae and sclerae normal  HENT: ear canals and TM's normal, left side blocked, inferior turbinate inflamed,  and mouth without ulcers or lesions  NECK: no adenopathy, no asymmetry, masses, or scars and thyroid normal to palpation  RESP: lungs clear to auscultation - no rales, rhonchi or wheezes  CV: regular rate and rhythm, normal S1 S2, no S3 or S4, no murmur, click or rub, no peripheral edema and peripheral pulses strong  ABDOMEN: soft, nontender, no hepatosplenomegaly, no masses and bowel sounds normal  MS: no gross musculoskeletal defects noted, no edema          "

## 2022-05-04 NOTE — TELEPHONE ENCOUNTER
Reason for Call:  Medication or medication refill:    Do you use a [facility]?  Name of the pharmacy and phone number for the current request:    [facility], [address] [place] [address].    Name of the medication requested: a different antibiotic    Other request: patient had an evisit on 4-24, for a sinus infection and was prescribed Amoxicillin.  This was finished 3 days ago and patient is not any better.  Was told to contact her PCP if no better.  Is requesting a different antibiotic.    Can we leave a detailed message on this number? YES    Phone number patient can be reached at: Cell number on file:    Telephone Information:   Mobile [phone]     Best Time: anytime    Call taken on 5/4/2022 at 12:09 PM by Michela Galvan

## 2022-05-04 NOTE — PATIENT INSTRUCTIONS
Trial of allegra D with 2 nasal sprays for few days if not better but fever , lethargic then use steroid(medrol dose pack)    If not better with antibiotics  If persisting then follow up with ENT as discussed    Follow up with us in June for physical   CHARO Mahoney.O.        Patient Education

## 2022-05-04 NOTE — TELEPHONE ENCOUNTER
Routing to PCP- see phone call    Pt had E-visit for Sinus infection and was prescribed Amox. This was finished 3 days ago and patient is not any better.  Was told to contact her PCP if no better.  Is requesting a different antibiotic.    Do you recommend a different antibiotic or visit to reassess?    Pt uses [facility] [place] if needed    Rhonda Min RN

## 2022-05-04 NOTE — TELEPHONE ENCOUNTER
Patient was seen by  provider 4/24 after my visit for the same thing  Needs in person visit with any provider  Cheli Ruano D.O.

## 2022-07-05 ENCOUNTER — E-VISIT (OUTPATIENT)
Dept: FAMILY MEDICINE | Facility: CLINIC | Age: 22
End: 2022-07-05
Payer: COMMERCIAL

## 2022-07-05 DIAGNOSIS — J32.0 CHRONIC MAXILLARY SINUSITIS: Primary | ICD-10-CM

## 2022-07-05 PROCEDURE — 99421 OL DIG E/M SVC 5-10 MIN: CPT | Performed by: FAMILY MEDICINE

## 2022-07-06 RX ORDER — METHYLPREDNISOLONE 4 MG
TABLET, DOSE PACK ORAL
Qty: 21 TABLET | Refills: 0 | Status: SHIPPED | OUTPATIENT
Start: 2022-07-06 | End: 2022-08-16

## 2022-07-06 NOTE — PATIENT INSTRUCTIONS
Dear Mireya Yoon    After reviewing your responses, I've been able to diagnose you with? unsure .?     Based on your responses and diagnosis, I have prescribed steriods to treat your symptoms. I have sent this to your pharmacy.?     It is also important to stay well hydrated, get lots of rest and take over-the-counter decongestants,?tylenol?or ibuprofen if you?are able to?take those medications per your primary care provider to help relieve discomfort.?     It is important that you take?all of?your prescribed medication even if your symptoms are improving after a few doses.? Taking?all of?your medicine helps prevent the symptoms from returning.?     If your symptoms worsen, you develop severe headache, vomiting, high fever (>102), or are not improving in 7 days, please contact your primary care provider for an appointment or visit any of our convenient Walk-in Care or Urgent Care Centers to be seen which can be found on our website?here.?     Thanks again for choosing?us?as your health care partner,?   ?  Cheli Ruano, DO?

## 2022-08-02 ENCOUNTER — MYC MEDICAL ADVICE (OUTPATIENT)
Dept: FAMILY MEDICINE | Facility: CLINIC | Age: 22
End: 2022-08-02

## 2022-08-09 ASSESSMENT — ENCOUNTER SYMPTOMS
PALPITATIONS: 0
HEADACHES: 1
HEMATURIA: 0
HEARTBURN: 0
NERVOUS/ANXIOUS: 0
PARESTHESIAS: 0
CHILLS: 0
EYE PAIN: 0
SHORTNESS OF BREATH: 0
DIARRHEA: 0
COUGH: 0
MYALGIAS: 0
CONSTIPATION: 0
WEAKNESS: 0
DIZZINESS: 0
HEMATOCHEZIA: 0
JOINT SWELLING: 0
BREAST MASS: 0
FEVER: 0
FREQUENCY: 0
SORE THROAT: 0
NAUSEA: 0
DYSURIA: 0
ARTHRALGIAS: 0
ABDOMINAL PAIN: 0

## 2022-08-16 ENCOUNTER — OFFICE VISIT (OUTPATIENT)
Dept: FAMILY MEDICINE | Facility: CLINIC | Age: 22
End: 2022-08-16
Payer: COMMERCIAL

## 2022-08-16 VITALS
OXYGEN SATURATION: 98 % | SYSTOLIC BLOOD PRESSURE: 122 MMHG | DIASTOLIC BLOOD PRESSURE: 70 MMHG | HEIGHT: 67 IN | BODY MASS INDEX: 29.66 KG/M2 | TEMPERATURE: 98.9 F | WEIGHT: 189 LBS | HEART RATE: 90 BPM

## 2022-08-16 DIAGNOSIS — Z11.59 NEED FOR HEPATITIS C SCREENING TEST: ICD-10-CM

## 2022-08-16 DIAGNOSIS — Z11.3 SCREENING FOR STDS (SEXUALLY TRANSMITTED DISEASES): ICD-10-CM

## 2022-08-16 DIAGNOSIS — Z30.09 BIRTH CONTROL COUNSELING: ICD-10-CM

## 2022-08-16 DIAGNOSIS — Z00.00 ENCOUNTER FOR ANNUAL PHYSICAL EXAM: Primary | ICD-10-CM

## 2022-08-16 DIAGNOSIS — Z12.4 CERVICAL CANCER SCREENING: ICD-10-CM

## 2022-08-16 LAB — HGB BLD-MCNC: 11.8 G/DL (ref 11.7–15.7)

## 2022-08-16 PROCEDURE — 87491 CHLMYD TRACH DNA AMP PROBE: CPT | Performed by: FAMILY MEDICINE

## 2022-08-16 PROCEDURE — 87591 N.GONORRHOEAE DNA AMP PROB: CPT | Performed by: FAMILY MEDICINE

## 2022-08-16 PROCEDURE — 99395 PREV VISIT EST AGE 18-39: CPT | Performed by: FAMILY MEDICINE

## 2022-08-16 PROCEDURE — 80053 COMPREHEN METABOLIC PANEL: CPT | Performed by: FAMILY MEDICINE

## 2022-08-16 PROCEDURE — 86803 HEPATITIS C AB TEST: CPT | Performed by: FAMILY MEDICINE

## 2022-08-16 PROCEDURE — G0123 SCREEN CERV/VAG THIN LAYER: HCPCS | Performed by: FAMILY MEDICINE

## 2022-08-16 PROCEDURE — 85018 HEMOGLOBIN: CPT | Performed by: FAMILY MEDICINE

## 2022-08-16 PROCEDURE — 84443 ASSAY THYROID STIM HORMONE: CPT | Performed by: FAMILY MEDICINE

## 2022-08-16 PROCEDURE — 36415 COLL VENOUS BLD VENIPUNCTURE: CPT | Performed by: FAMILY MEDICINE

## 2022-08-16 ASSESSMENT — ENCOUNTER SYMPTOMS
JOINT SWELLING: 0
ABDOMINAL PAIN: 0
HEMATOCHEZIA: 0
PALPITATIONS: 0
HEARTBURN: 0
COUGH: 0
WEAKNESS: 0
EYE PAIN: 0
NAUSEA: 0
SHORTNESS OF BREATH: 0
MYALGIAS: 0
DIARRHEA: 0
HEADACHES: 1
BREAST MASS: 0
CONSTIPATION: 0
DYSURIA: 0
NERVOUS/ANXIOUS: 0
PARESTHESIAS: 0
FREQUENCY: 0
HEMATURIA: 0
CHILLS: 0
ARTHRALGIAS: 0
FEVER: 0
SORE THROAT: 0
DIZZINESS: 0

## 2022-08-16 ASSESSMENT — PAIN SCALES - GENERAL: PAINLEVEL: NO PAIN (0)

## 2022-08-16 NOTE — PROGRESS NOTES
SUBJECTIVE:   CC: Mireya Yoon is an 22 year old woman who presents for preventive health visit.       Patient has been advised of split billing requirements and indicates understanding: Yes  Healthy Habits:     Getting at least 3 servings of Calcium per day:  Yes    Bi-annual eye exam:  Yes    Dental care twice a year:  Yes    Sleep apnea or symptoms of sleep apnea:  Daytime drowsiness    Diet:  Regular (no restrictions)    Frequency of exercise:  2-3 days/week    Duration of exercise:  30-45 minutes    Taking medications regularly:  Yes    Medication side effects:  Not applicable    PHQ-2 Total Score: 2    Additional concerns today:  Yes          Concern - Discuss birth control options      Description:   Has used the nexplanon, iud and ocp in the past - ocp stopped due to weight gain and mood changes   Patient would like to go back to Nexplanon or some other kind of IUD.  She is requesting a referral for evaluation.  In the past OCP has caused some weight gain.    Today's PHQ-2 Score:   PHQ-2 ( 1999 Pfizer) 8/9/2022   Q1: Little interest or pleasure in doing things 1   Q2: Feeling down, depressed or hopeless 1   PHQ-2 Score 2   PHQ-2 Total Score (12-17 Years)- Positive if 3 or more points; Administer PHQ-A if positive -   Q1: Little interest or pleasure in doing things Several days   Q2: Feeling down, depressed or hopeless Several days   PHQ-2 Score 2       Abuse: Current or Past (Physical, Sexual or Emotional) - No  Do you feel safe in your environment? Yes    Have you ever done Advance Care Planning? (For example, a Health Directive, POLST, or a discussion with a medical provider or your loved ones about your wishes): NO    Social History     Tobacco Use     Smoking status: Never Smoker     Smokeless tobacco: Never Used   Substance Use Topics     Alcohol use: Yes     Comment: seldom/rare         Alcohol Use 8/9/2022   Prescreen: >3 drinks/day or >7 drinks/week? No   Prescreen: >3 drinks/day or >7 drinks/week?  "-       Reviewed orders with patient.  Reviewed health maintenance and updated orders accordingly - Yes  Lab work is in process    Breast Cancer Screening:        History of abnormal Pap smear: NO - age 21-29 PAP every 3 years recommended  PAP / HPV 12/30/2019   PAP (Historical) NIL     Reviewed and updated as needed this visit by clinical staff   Tobacco  Allergies  Meds      Soc Hx          Reviewed and updated as needed this visit by Provider                       Review of Systems   Constitutional: Negative for chills and fever.   HENT: Negative for congestion, ear pain, hearing loss and sore throat.    Eyes: Negative for pain and visual disturbance.   Respiratory: Negative for cough and shortness of breath.    Cardiovascular: Negative for chest pain, palpitations and peripheral edema.   Gastrointestinal: Negative for abdominal pain, constipation, diarrhea, heartburn, hematochezia and nausea.   Breasts:  Negative for tenderness, breast mass and discharge.   Genitourinary: Negative for dysuria, frequency, genital sores, hematuria, pelvic pain, vaginal bleeding and vaginal discharge.   Musculoskeletal: Negative for arthralgias, joint swelling and myalgias.   Skin: Negative for rash.   Neurological: Positive for headaches. Negative for dizziness, weakness and paresthesias.   Psychiatric/Behavioral: Positive for mood changes. The patient is not nervous/anxious.           OBJECTIVE:   /70   Pulse 90   Temp 98.9  F (37.2  C) (Tympanic)   Ht 1.702 m (5' 7\")   Wt 85.7 kg (189 lb)   LMP 07/29/2022 (Approximate)   SpO2 98%   BMI 29.60 kg/m    Physical Exam  GENERAL: healthy, alert and no distress  EYES: Eyes grossly normal to inspection, PERRL and conjunctivae and sclerae normal  HENT: ear canals and TM's normal, nose and mouth without ulcers or lesions  NECK: no adenopathy, no asymmetry, masses, or scars and thyroid normal to palpation  RESP: lungs clear to auscultation - no rales, rhonchi or wheezes  CV: " regular rate and rhythm, normal S1 S2, no S3 or S4, no murmur, click or rub, no peripheral edema and peripheral pulses strong  ABDOMEN: soft, nontender, no hepatosplenomegaly, no masses and bowel sounds normal   (female): normal female external genitalia, normal urethral meatus, vaginal mucosa pink, moist, well rugated, and normal cervix/adnexa/uterus without masses or discharge, no CMT  MS: no gross musculoskeletal defects noted, no edema  SKIN: no suspicious lesions or rashes  NEURO: Normal strength and tone, mentation intact and speech normal  PSYCH: mentation appears normal, affect normal/bright      ASSESSMENT/PLAN:   Mireya was seen today for physical.    Diagnoses and all orders for this visit:    Encounter for annual physical exam  -     Comprehensive metabolic panel (BMP + Alb, Alk Phos, ALT, AST, Total. Bili, TP); Future  -     Hemoglobin; Future  -     TSH; Future  -     Comprehensive metabolic panel (BMP + Alb, Alk Phos, ALT, AST, Total. Bili, TP)  -     Hemoglobin  -     TSH    Need for hepatitis C screening test  -     Hepatitis C Screen Reflex to HCV RNA Quant and Genotype; Future  -     Hepatitis C Screen Reflex to HCV RNA Quant and Genotype    Screening for STDs (sexually transmitted diseases)  -     NEISSERIA GONORRHOEA PCR; Future  -     CHLAMYDIA TRACHOMATIS PCR; Future  -     NEISSERIA GONORRHOEA PCR  -     CHLAMYDIA TRACHOMATIS PCR    Cervical cancer screening  -     Pap Screen only - recommended age 21 - 24 years    Birth control counseling  -     Ob/Gyn Referral; Future  Patient interested in getting an Implanon or Nexplanon.  Suggested OB/GYN referral.  Discussed that some issues with mood and weight gain in the past.  Other orders  -     REVIEW OF HEALTH MAINTENANCE PROTOCOL ORDERS        Patient has been advised of split billing requirements and indicates understanding: Yes    COUNSELING:  Reviewed preventive health counseling, as reflected in patient instructions       Healthy  "diet/nutrition       Vision screening    Estimated body mass index is 29.6 kg/m  as calculated from the following:    Height as of this encounter: 1.702 m (5' 7\").    Weight as of this encounter: 85.7 kg (189 lb).        She reports that she has never smoked. She has never used smokeless tobacco.      Counseling Resources:  ATP IV Guidelines  Pooled Cohorts Equation Calculator  Breast Cancer Risk Calculator  BRCA-Related Cancer Risk Assessment: FHS-7 Tool  FRAX Risk Assessment  ICSI Preventive Guidelines  Dietary Guidelines for Americans, 2010  USDA's MyPlate  ASA Prophylaxis  Lung CA Screening    Reynaldo Lozada MD  [facility]  "

## 2022-08-17 LAB
ALBUMIN SERPL-MCNC: 3.6 G/DL (ref 3.4–5)
ALP SERPL-CCNC: 51 U/L (ref 40–150)
ALT SERPL W P-5'-P-CCNC: 17 U/L (ref 0–50)
ANION GAP SERPL CALCULATED.3IONS-SCNC: 6 MMOL/L (ref 3–14)
AST SERPL W P-5'-P-CCNC: 17 U/L (ref 0–45)
BILIRUB SERPL-MCNC: 0.2 MG/DL (ref 0.2–1.3)
BUN SERPL-MCNC: 13 MG/DL (ref 7–30)
C TRACH DNA SPEC QL NAA+PROBE: NEGATIVE
CALCIUM SERPL-MCNC: 8.8 MG/DL (ref 8.5–10.1)
CHLORIDE BLD-SCNC: 111 MMOL/L (ref 94–109)
CO2 SERPL-SCNC: 23 MMOL/L (ref 20–32)
CREAT SERPL-MCNC: 0.85 MG/DL (ref 0.52–1.04)
GFR SERPL CREATININE-BSD FRML MDRD: >90 ML/MIN/1.73M2
GLUCOSE BLD-MCNC: 92 MG/DL (ref 70–99)
N GONORRHOEA DNA SPEC QL NAA+PROBE: NEGATIVE
POTASSIUM BLD-SCNC: 4.2 MMOL/L (ref 3.4–5.3)
PROT SERPL-MCNC: 7.4 G/DL (ref 6.8–8.8)
SODIUM SERPL-SCNC: 140 MMOL/L (ref 133–144)
TSH SERPL DL<=0.005 MIU/L-ACNC: 2.4 MU/L (ref 0.4–4)

## 2022-08-18 LAB
BKR LAB AP GYN ADEQUACY: NORMAL
BKR LAB AP GYN INTERPRETATION: NORMAL
BKR LAB AP HPV REFLEX: NO
BKR LAB AP LMP: NORMAL
BKR LAB AP PREVIOUS ABNORMAL: NORMAL
HCV AB SERPL QL IA: NONREACTIVE
PATH REPORT.COMMENTS IMP SPEC: NORMAL
PATH REPORT.COMMENTS IMP SPEC: NORMAL
PATH REPORT.RELEVANT HX SPEC: NORMAL

## 2022-08-22 ENCOUNTER — OFFICE VISIT (OUTPATIENT)
Dept: MIDWIFE SERVICES | Facility: CLINIC | Age: 22
End: 2022-08-22
Payer: COMMERCIAL

## 2022-08-22 VITALS
BODY MASS INDEX: 29.98 KG/M2 | HEIGHT: 67 IN | DIASTOLIC BLOOD PRESSURE: 70 MMHG | WEIGHT: 191 LBS | SYSTOLIC BLOOD PRESSURE: 110 MMHG

## 2022-08-22 DIAGNOSIS — Z30.430 ENCOUNTER FOR INSERTION OF INTRAUTERINE CONTRACEPTIVE DEVICE: Primary | ICD-10-CM

## 2022-08-22 DIAGNOSIS — Z30.430 ENCOUNTER FOR IUD INSERTION: ICD-10-CM

## 2022-08-22 LAB — HCG IFA URINE: NEGATIVE

## 2022-08-22 PROCEDURE — 58300 INSERT INTRAUTERINE DEVICE: CPT | Performed by: ADVANCED PRACTICE MIDWIFE

## 2022-08-22 PROCEDURE — 99212 OFFICE O/P EST SF 10 MIN: CPT | Mod: 25 | Performed by: ADVANCED PRACTICE MIDWIFE

## 2022-08-22 PROCEDURE — 84703 CHORIONIC GONADOTROPIN ASSAY: CPT | Performed by: ADVANCED PRACTICE MIDWIFE

## 2022-08-22 NOTE — NURSING NOTE
"Chief Complaint   Patient presents with     Contraception   Discuss contraceptive options    initial /70   Ht 1.702 m (5' 7\")   Wt 86.6 kg (191 lb)   LMP 08/18/2022   BMI 29.91 kg/m   Estimated body mass index is 29.91 kg/m  as calculated from the following:    Height as of this encounter: 1.702 m (5' 7\").    Weight as of this encounter: 86.6 kg (191 lb).  BP completed using cuff size regular.  Tierney Babin CMA    "

## 2022-08-22 NOTE — PROGRESS NOTES
SUBJECTIVE:   Mireya Yoon is a 22 year old who presents to the clinic for discussion of birth control methods.   She has used the following methods in the past: OCPs, Nexplanon, IUD    Today she is interested in discussing Mirena IUD and Kyleena IUD  Histories reviewed and updated  Past Medical History:   Diagnosis Date     Anxiety      Past Surgical History:   Procedure Laterality Date     NO HISTORY OF SURGERY  08/22/2022     Social History     Socioeconomic History     Marital status: Single     Spouse name: Not on file     Number of children: Not on file     Years of education: Not on file     Highest education level: Not on file   Occupational History     Not on file   Tobacco Use     Smoking status: Never Smoker     Smokeless tobacco: Never Used   Vaping Use     Vaping Use: Never used   Substance and Sexual Activity     Alcohol use: Yes     Comment: seldom/rare     Drug use: No     Sexual activity: Yes     Partners: Male     Birth control/protection: Condom   Other Topics Concern     Parent/sibling w/ CABG, MI or angioplasty before 65F 55M? No   Social History Narrative     Not on file     Social Determinants of Health     Financial Resource Strain: Not on file   Food Insecurity: Not on file   Transportation Needs: Not on file   Physical Activity: Not on file   Stress: Not on file   Social Connections: Not on file   Intimate Partner Violence: Not on file   Housing Stability: Not on file     Family History   Problem Relation Age of Onset     Hypertension Mother      Gallbladder Disease Mother      Unknown/Adopted Father      Diabetes Maternal Grandmother      Glaucoma No family hx of      Macular Degeneration No family hx of        Menstrual History:  Menses every 28 days.  Length of menses: 5 days  Menstrual description: normal, heavy and painful    Denies the following contraindications to estrogen/progesterone combined contraception:  Migraine with aura  Smoking over age 35  Liver disease  Personal history  "of blood clot or stroke   History of heart disease  History of breast cancer  Undiagnosed vaginal bleeding  Hypertension  Pregnancy        EXAM:  /70   Ht 1.702 m (5' 7\")   Wt 86.6 kg (191 lb)   LMP 2022   BMI 29.91 kg/m    Body mass index is 29.91 kg/m .  Exam:  Constitutional: healthy, alert and no distress  Respiratory: negative  Psychiatric: mentation appears normal and affect normal/bright  No further exam needed as this is a counseling appointment.    ASSESSMENT/PLAN:    ICD-10-CM    1. Encounter for insertion of intrauterine contraceptive device  Z30.430 levonorgestrel (KYLEENA) 19.5 MG IUD     levonorgestrel (KYLEENA) 19.5 MG IUD 19.5 mg     INSERTION INTRAUTERINE DEVICE     HCG IFA Urine POCT, Enter/Edit Result     There are no contraindications to the use of Kyleena IUD    COUNSELING:  Reviewed risks and benefits of contraceptive use  Discussed proper use of chosen method  Handouts/Instructions provided      IUD Insertion:  CONSULT:    Is a pregnancy test required: Yes.  Was it positive or negative?  Negative  Was a consent obtained?  Yes    Subjective: Mireya Yoon is a 22 year old  presents for IUD and desires Kyleena type IUD.    Patient has been given the opportunity to ask questions about all forms of birth control, including all options appropriate for Mireya Yoon. Discussed that no method of birth control, except abstinence is 100% effective against pregnancy or sexually transmitted infection.     Mireya Yoon understands she may have the IUD removed at any time. IUD should be removed by a health care provider.    The entire insertion procedure was reviewed with the patient, including care after placement.    Results for orders placed or performed in visit on 22   HCG IFA Urine POCT, Enter/Edit Result     Status: None   Result Value Ref Range    HCG IFA Urine Negative neg         Patient's last menstrual period was 2022. Last sexual activity: always uses condoms. No " "allergy to betadine or shellfish. Recent STD screening  HCG Qual Urine   Date Value Ref Range Status   07/07/2021 Negative NEG^Negative Final     Comment:     This test is for screening purposes.  Results should be interpreted along with   the clinical picture.  Confirmation testing is available if warranted by   ordering [id number], HCG Quantitative Pregnancy.           /70   Ht 1.702 m (5' 7\")   Wt 86.6 kg (191 lb)   LMP 08/18/2022   BMI 29.91 kg/m      Pelvic Exam:   EG/BUS: normal genital architecture without lesions, erythema or abnormal secretions.   Vagina: moist, pink, rugae with physiologic discharge and secretions  Cervix: nuliparous no lesions and pink, moist, closed, without lesion or CMT    PROCEDURE NOTE: -- IUD Insertion    Reason for Insertion: contraception    Premedicated with ibuprofen.  Under sterile technique, cervix was visualized with speculum and prepped with Betadine solution swab x 3. The uterus was gently  sounded to 8.0 cm. IUD prepared for placement, and IUD inserted according to 's instructions without difficulty or significant resitance, and deployed at the fundus. The strings were visualized and trimmed to 2.5 cm from the external os. Hemostasis noted. Speculum removed.  Patient tolerated procedure well.    Lot # Zeke  Exp: Zeke    EBL: minimal    Complications: none    ASSESSMENT:     ICD-10-CM    1. Encounter for insertion of intrauterine contraceptive device  Z30.430 levonorgestrel (KYLEENA) 19.5 MG IUD     levonorgestrel (KYLEENA) 19.5 MG IUD 19.5 mg     INSERTION INTRAUTERINE DEVICE     HCG IFA Urine POCT, Enter/Edit Result        PLAN:    Given 's handouts, including when to have IUD removed, list of danger s/sx, side effects and follow up recommended. Encouraged condom use for prevention of STD. Back up contraception advised for 7 days if progestin method. Advised to call for any fever, for prolonged or severe pain or bleeding, abnormal vaginal " discharge, or unable to palpate strings. She was advised to use pain medications (ibuprofen) as needed for mild to moderate pain. Advised to follow-up in clinic in 4-6 weeks for IUD string check if unable to find strings or as directed by provider.     Jackelyn Heaton CNM

## 2022-10-15 ENCOUNTER — HOSPITAL ENCOUNTER (EMERGENCY)
Facility: CLINIC | Age: 22
Discharge: HOME | End: 2022-10-15
Attending: EMERGENCY MEDICINE | Admitting: EMERGENCY MEDICINE
Payer: COMMERCIAL

## 2022-10-15 VITALS
SYSTOLIC BLOOD PRESSURE: 148 MMHG | DIASTOLIC BLOOD PRESSURE: 93 MMHG | TEMPERATURE: 98.1 F | RESPIRATION RATE: 20 BRPM | OXYGEN SATURATION: 100 % | HEART RATE: 70 BPM

## 2022-10-15 DIAGNOSIS — G24.3 SPASMODIC TORTICOLLIS: ICD-10-CM

## 2022-10-15 PROCEDURE — 250N000013 HC RX MED GY IP 250 OP 250 PS 637: Performed by: EMERGENCY MEDICINE

## 2022-10-15 PROCEDURE — 96372 THER/PROPH/DIAG INJ SC/IM: CPT | Performed by: EMERGENCY MEDICINE

## 2022-10-15 PROCEDURE — 99284 EMERGENCY DEPT VISIT MOD MDM: CPT | Mod: 25

## 2022-10-15 PROCEDURE — 250N000011 HC RX IP 250 OP 636: Performed by: EMERGENCY MEDICINE

## 2022-10-15 RX ORDER — CYCLOBENZAPRINE HCL 5 MG
5-10 TABLET ORAL EVERY 8 HOURS PRN
Qty: 30 TABLET | Refills: 0 | Status: SHIPPED | OUTPATIENT
Start: 2022-10-15 | End: 2022-10-16

## 2022-10-15 RX ORDER — CYCLOBENZAPRINE HCL 10 MG
10 TABLET ORAL ONCE
Status: COMPLETED | OUTPATIENT
Start: 2022-10-15 | End: 2022-10-15

## 2022-10-15 RX ORDER — MORPHINE SULFATE 4 MG/ML
6 INJECTION, SOLUTION INTRAMUSCULAR; INTRAVENOUS ONCE
Status: COMPLETED | OUTPATIENT
Start: 2022-10-15 | End: 2022-10-15

## 2022-10-15 RX ADMIN — CYCLOBENZAPRINE 10 MG: 10 TABLET, FILM COATED ORAL at 14:13

## 2022-10-15 RX ADMIN — MORPHINE SULFATE 6 MG: 4 INJECTION, SOLUTION INTRAMUSCULAR; INTRAVENOUS at 14:13

## 2022-10-15 ASSESSMENT — ENCOUNTER SYMPTOMS
NECK STIFFNESS: 1
NECK PAIN: 1
MYALGIAS: 1

## 2022-10-15 NOTE — ED PROVIDER NOTES
History   Chief Complaint:  Torticollis       The history is provided by the patient.      Mireya Yoon is a 22 year old female with history of pain in her thoracic spine who presents with neck pain and spasms that started this morning when she woke up around 0800. She denies ever experiencing this before. She has been experiencing constant pain that is exacerbated by movement. She tried using heat, ice, and naproxen but it did not help to alleviate her symptoms. The patient states she normally gets headaches and is experiencing one now along with her neck pain. Acute application of pressure exacerbates her pain. She is unable to move her head to the left due to the pain. She denies any recent trauma.    Review of Systems   Musculoskeletal: Positive for myalgias, neck pain and neck stiffness.   All other systems reviewed and are negative.    Allergies:  Seasonal Allergies    Medications:  Levonorgestrel  Sertraline    Past Medical History:     Anxiety  Iron deficiency anemia  Dysmenorrhea  Pain in thoracic spine    Past Surgical History:    No past surgical history on file    Family History:    Mother: hypertension, gallbladder disease    Social History:  The patient presents to the ED with her sister.  PCP: Cheli Ruano     Physical Exam     Patient Vitals for the past 24 hrs:   BP Temp Temp src Pulse Resp SpO2   10/15/22 1352 (!) 148/93 98.1  F (36.7  C) Oral 70 20 100 %       Physical Exam  Physical Exam   Nursing note and vitals reviewed.  General: Oriented to person, place, and time. Appears well-developed and well-nourished.   Head: No signs of trauma.   Mouth/Throat: Oropharynx is clear and moist.   Eyes: Conjunctivae are normal. Pupils are equal, round, and reactive to light.   Neck: Normal range of motion. No nuchal rigidity.   Cardiovascular: Normal rate and regular rhythm.    Respiratory: Effort normal and breath sounds normal. No respiratory distress.   Abdominal: Soft. There is no  tenderness. There is no guarding.   Musculoskeletal: Normal range of motion. no edema.   Neurological: The patient is alert and oriented to person, place, and time.  PERRLA, EOMI, visual fields intact, strength in upper/lower extremities normal and symmetrical.   Sensation normal. Speech normal  GCS eye subscore is 4. GCS verbal subscore is 5. GCS motor subscore is 6.   Skin: Skin is warm and dry. No rash noted.   Psychiatric: normal mood and affect. behavior is normal.       Emergency Department Course     Emergency Department Course:     Reviewed:  I reviewed nursing notes, vitals, past medical history and Care Everywhere    Assessments:  1406 I obtained history and examined the patient as noted above.   1444 I rechecked the patient and explained findings.     Interventions:  1413 Morphine 6 mg IM  1413 Flexeril 10 mg PO    Disposition:  The patient was discharged to home.     Impression & Plan     Medical Decision Making:  Mireya Yoon is a 22 year old female who presents for evaluation of neck pain and difficulty turning.  Clinical examination is consistent with torticollis. There is no history of trauma and no evidence of cervical radiculopathy, ligamentous instability, myelopathy, dissection, spinal tumor or abscess at this time. No fevers or sore throat or clinical evidence of deep space infection such as retropharyngeal abscess, epiglottitis or meningitis. She has no risk factors for spinal epidural abscess or hematoma is without other neurologic symptoms. I do not feel imaging or laboratory studies are indicated at this point.  I discussed worrisome symptoms/signs, if they were to evolve, that should prompt the patient to follow up more quickly or return to the ED.  Supportive outpatient management is indicated and antispasmodics and pain medication prescriptions were provided.      Diagnosis:    ICD-10-CM    1. Spasmodic torticollis  G24.3           Discharge Medications:  New Prescriptions     CYCLOBENZAPRINE (FLEXERIL) 5 MG TABLET    Take 1-2 tablets (5-10 mg) by mouth every 8 hours as needed for muscle spasms       Scribe Disclosure:  I, Adalid Mac, am serving as a scribe at 2:05 PM on 10/15/2022 to document services personally performed by Robe Frances MD based on my observations and the provider's statements to me.            Robe Frances MD  10/15/22 [id number]

## 2022-10-15 NOTE — ED TRIAGE NOTES
Patient states she woke up and she is unable to move her neck from the positioned she was sleeping in.

## 2022-10-16 RX ORDER — CYCLOBENZAPRINE HCL 5 MG
5-10 TABLET ORAL EVERY 8 HOURS PRN
Qty: 30 TABLET | Refills: 0 | Status: SHIPPED | OUTPATIENT
Start: 2022-10-16 | End: 2023-08-08

## 2022-11-09 ENCOUNTER — VIRTUAL VISIT (OUTPATIENT)
Dept: FAMILY MEDICINE | Facility: CLINIC | Age: 22
End: 2022-11-09
Payer: COMMERCIAL

## 2022-11-09 DIAGNOSIS — H53.9 VISION CHANGES: ICD-10-CM

## 2022-11-09 DIAGNOSIS — R42 DIZZINESS: Primary | ICD-10-CM

## 2022-11-09 PROCEDURE — 99214 OFFICE O/P EST MOD 30 MIN: CPT | Mod: 95 | Performed by: FAMILY MEDICINE

## 2022-11-09 NOTE — PROGRESS NOTES
Mireya is a 22 year old who is being evaluated via a billable video visit.      How would you like to obtain your AVS? MyChart  If the video visit is dropped, the invitation should be resent by: Text to cell phone: [phone]  Will anyone else be joining your video visit? No          ICD-10-CM    1. Dizziness  R42 CBC with platelets and differential     TSH with free T4 reflex     Vitamin D Deficiency     Ferritin     Iron and iron binding capacity      2. Vision changes  H53.9 CBC with platelets and differential     TSH with free T4 reflex     Vitamin D Deficiency     Ferritin     Iron and iron binding capacity        Recent history of upper respiratory infection infection. She reports few second of symptoms. Wearing mask all day at work, ? Dehydration or from UPPER RESPIRATORY INFECTION infection. Advised checking labs as she does have history of iron def.  Advised visit with eye care provider, she does have upcoming appoint.  Her chronic headaches are stable and does not precede with theses symptoms    She does have kayleena and is doing well on it, no heavy periods  Advised close monitoring, if not resolving follow up in 3-4 weeks  Increase hydration, keep close diary, she reports of increased stress, ? Anxiety induced but she does not think so.       Subjective   Mireya is a 22 year old, presenting for the following health issues:  No chief complaint on file.    ScoreGridHART MESSAGE from today:  I believe my iron may be low, or something may be going on. I ve been having some dizzy, blurry vision spells at random times throughout the day, along with excessive fatigue and tiredness. Nothing has seemed to help, and I can t figure out exactly what it is. My vitals have all been fine, so just wondering if labs could help.  Let me know!      She has kayleena    3-4 days of mild dizziness off and on, not real headache, feeling like her vision changes, light periods  History of iron def  No head trauma, no nausea, stool  normal , no infectious sx, eating well  No supplements  caffine occasionally , tea  No drug use    Some stress in her life  She was reading and she looked up got dizzy and few sec of vision changes but got back to normal  She had normal vitals, she works at the hospital  No history of seizers  Not taking iron or vit d    Not happening in position changes, no recent flying, no excessive sweating, no palpitations    A week ago had UPPER RESPIRATORY INFECTION, no sinus pressure  Took 2 covid test and neg  No issues with smell or taste  History of Present Illness       Reason for visit:  Communicate about symptoms.    She eats 2-3 servings of fruits and vegetables daily.She consumes 0 sweetened beverage(s) daily.She exercises with enough effort to increase her heart rate 30 to 60 minutes per day.  She exercises with enough effort to increase her heart rate 4 days per week.   She is taking medications regularly.             Review of Systems   Constitutional, HEENT, cardiovascular, pulmonary, GI, , musculoskeletal, neuro, skin, endocrine and psych systems are negative, except as otherwise noted.      Objective           Vitals:  No vitals were obtained today due to virtual visit.    Physical Exam   GENERAL: Healthy, alert and no distress  EYES: Eyes grossly normal to inspection.  No discharge or erythema, or obvious scleral/conjunctival abnormalities.  RESP: No audible wheeze, cough, or visible cyanosis.  No visible retractions or increased work of breathing.    SKIN: Visible skin clear. No significant rash, abnormal pigmentation or lesions.  NEURO: Cranial nerves grossly intact.  Mentation and speech appropriate for age.  PSYCH: Mentation appears normal, affect normal/bright, judgement and insight intact, normal speech and appearance well-groomed.            Video-Visit Details    Video Start Time: 4:28 PM    Type of service:  Video Visit    Video End Time:4:40 PM    Originating Location (pt. Location):  Home        Distant Location (provider location):  On-site    Platform used for Video Visit: [employer]

## 2022-11-11 ENCOUNTER — LAB (OUTPATIENT)
Dept: LAB | Facility: CLINIC | Age: 22
End: 2022-11-11
Payer: COMMERCIAL

## 2022-11-11 DIAGNOSIS — R42 DIZZINESS: ICD-10-CM

## 2022-11-11 DIAGNOSIS — H53.9 VISION CHANGES: ICD-10-CM

## 2022-11-11 LAB
BASOPHILS # BLD AUTO: 0.1 10E3/UL (ref 0–0.2)
BASOPHILS NFR BLD AUTO: 1 %
EOSINOPHIL # BLD AUTO: 0.2 10E3/UL (ref 0–0.7)
EOSINOPHIL NFR BLD AUTO: 3 %
ERYTHROCYTE [DISTWIDTH] IN BLOOD BY AUTOMATED COUNT: 13.4 % (ref 10–15)
FERRITIN SERPL-MCNC: 14 NG/ML (ref 12–150)
HCT VFR BLD AUTO: 39.3 % (ref 35–47)
HGB BLD-MCNC: 12.2 G/DL (ref 11.7–15.7)
IMM GRANULOCYTES # BLD: 0 10E3/UL
IMM GRANULOCYTES NFR BLD: 0 %
IRON BINDING CAPACITY (ROCHE): 289 UG/DL (ref 240–430)
IRON SATN MFR SERPL: 21 % (ref 15–46)
IRON SERPL-MCNC: 60 UG/DL (ref 37–145)
LYMPHOCYTES # BLD AUTO: 2.5 10E3/UL (ref 0.8–5.3)
LYMPHOCYTES NFR BLD AUTO: 33 %
MCH RBC QN AUTO: 25.4 PG (ref 26.5–33)
MCHC RBC AUTO-ENTMCNC: 31 G/DL (ref 31.5–36.5)
MCV RBC AUTO: 82 FL (ref 78–100)
MONOCYTES # BLD AUTO: 0.5 10E3/UL (ref 0–1.3)
MONOCYTES NFR BLD AUTO: 7 %
NEUTROPHILS # BLD AUTO: 4.2 10E3/UL (ref 1.6–8.3)
NEUTROPHILS NFR BLD AUTO: 56 %
PLATELET # BLD AUTO: 350 10E3/UL (ref 150–450)
RBC # BLD AUTO: 4.8 10E6/UL (ref 3.8–5.2)
TSH SERPL DL<=0.005 MIU/L-ACNC: 2.34 MU/L (ref 0.4–4)
WBC # BLD AUTO: 7.5 10E3/UL (ref 4–11)

## 2022-11-11 PROCEDURE — 82728 ASSAY OF FERRITIN: CPT

## 2022-11-11 PROCEDURE — 36415 COLL VENOUS BLD VENIPUNCTURE: CPT

## 2022-11-11 PROCEDURE — 84443 ASSAY THYROID STIM HORMONE: CPT

## 2022-11-11 PROCEDURE — 82306 VITAMIN D 25 HYDROXY: CPT

## 2022-11-11 PROCEDURE — 85025 COMPLETE CBC W/AUTO DIFF WBC: CPT

## 2022-11-11 PROCEDURE — 83550 IRON BINDING TEST: CPT

## 2022-11-11 PROCEDURE — 83540 ASSAY OF IRON: CPT

## 2022-11-12 LAB — DEPRECATED CALCIDIOL+CALCIFEROL SERPL-MC: 28 UG/L (ref 20–75)

## 2022-12-06 ENCOUNTER — OFFICE VISIT (OUTPATIENT)
Dept: OPTOMETRY | Facility: CLINIC | Age: 22
End: 2022-12-06
Payer: COMMERCIAL

## 2022-12-06 DIAGNOSIS — H52.13 MYOPIA OF BOTH EYES: ICD-10-CM

## 2022-12-06 DIAGNOSIS — Z46.0 CONTACT LENS/GLASSES FITTING: ICD-10-CM

## 2022-12-06 DIAGNOSIS — H52.221 REGULAR ASTIGMATISM OF RIGHT EYE: ICD-10-CM

## 2022-12-06 DIAGNOSIS — Z01.00 ENCOUNTER FOR EXAMINATION OF EYES AND VISION WITHOUT ABNORMAL FINDINGS: Primary | ICD-10-CM

## 2022-12-06 PROCEDURE — 92310 CONTACT LENS FITTING OU: CPT | Mod: GA | Performed by: OPTOMETRIST

## 2022-12-06 PROCEDURE — 92014 COMPRE OPH EXAM EST PT 1/>: CPT | Performed by: OPTOMETRIST

## 2022-12-06 PROCEDURE — 92015 DETERMINE REFRACTIVE STATE: CPT | Performed by: OPTOMETRIST

## 2022-12-06 ASSESSMENT — EXTERNAL EXAM - RIGHT EYE: OD_EXAM: NORMAL

## 2022-12-06 ASSESSMENT — REFRACTION_WEARINGRX
OS_CYLINDER: +0.50
OS_AXIS: 134
OD_CYLINDER: +0.75
OS_SPHERE: -3.00
SPECS_TYPE: SVL
OD_AXIS: 013
OD_SPHERE: -2.75

## 2022-12-06 ASSESSMENT — CONF VISUAL FIELD
OD_SUPERIOR_NASAL_RESTRICTION: 0
OS_NORMAL: 1
OD_INFERIOR_TEMPORAL_RESTRICTION: 0
OD_INFERIOR_NASAL_RESTRICTION: 0
METHOD: COUNTING FINGERS
OD_NORMAL: 1
OS_INFERIOR_TEMPORAL_RESTRICTION: 0
OS_SUPERIOR_TEMPORAL_RESTRICTION: 0
OS_SUPERIOR_NASAL_RESTRICTION: 0
OS_INFERIOR_NASAL_RESTRICTION: 0
OD_SUPERIOR_TEMPORAL_RESTRICTION: 0

## 2022-12-06 ASSESSMENT — REFRACTION_CURRENTRX
OD_BASECURVE: 8.70
OS_SPHERE: -3.00
OD_BRAND: COOPER BIOFINITY TORIC BC 8.7, D 14.5
OS_CYLINDER: SPHERE
OD_AXIS: 100
OS_DIAMETER: 14.0
OD_DIAMETER: 14.5
OS_BRAND: COOPER BIOFINITY BC 8.6, D 14.0
OS_BASECURVE: 8.60
OD_CYLINDER: -0.75
OD_SPHERE: -2.00

## 2022-12-06 ASSESSMENT — REFRACTION_MANIFEST
OD_AXIS: 020
OD_SPHERE: -3.00
OS_CYLINDER: SPHERE
OS_SPHERE: -3.50
OD_CYLINDER: +0.50

## 2022-12-06 ASSESSMENT — VISUAL ACUITY
OS_CC: 20/20
OD_SC: 20/200
OS_CC: 20/25
OS_SC: 20/80+2
METHOD: SNELLEN - LINEAR
OD_CC: 20/20
OD_CC: 20/20
CORRECTION_TYPE: GLASSES
OS_CC+: +2
OS_SC: 20/250
OD_SC: 20/30

## 2022-12-06 ASSESSMENT — TONOMETRY
OS_IOP_MMHG: 19
OD_IOP_MMHG: 18
IOP_METHOD: APPLANATION

## 2022-12-06 ASSESSMENT — SLIT LAMP EXAM - LIDS
COMMENTS: NORMAL
COMMENTS: NORMAL

## 2022-12-06 ASSESSMENT — CUP TO DISC RATIO
OS_RATIO: 0.2
OD_RATIO: 0.2

## 2022-12-06 ASSESSMENT — EXTERNAL EXAM - LEFT EYE: OS_EXAM: NORMAL

## 2022-12-06 NOTE — PROGRESS NOTES
Chief Complaint   Patient presents with     Annual Eye Exam     New Eval For Contact Lens        Previous contact lens wearer? Yes: Biofinity each eye - Toric right eye   Comfort of contact lenses : Good  Satisfied with current lenses: Yes - would like to update CL Rx - OK with $75 fitting fee    Last Eye Exam: 7/31/2020  Dilated Previously: Yes, side effects of dilation explained today    What are you currently using to see?  glasses and contacts - using CL's only ~1x/week    Distance Vision Acuity: Noticed gradual change in both eyes    Near Vision Acuity: Satisfied with vision while reading and using computer with glasses and contacts    Eye Comfort: good  Do you use eye drops? : Yes: Systane PRN with CL's in   Occupation or Hobbies: [employer]      [employer]     Medical, surgical and family histories reviewed and updated 12/6/2022.       OBJECTIVE: See Ophthalmology exam    ASSESSMENT:  No diagnosis found.   PLAN:     Patient Instructions   Updated glasses and contact lens prescriptions provided today.     Biofinity spherical lens each eye.  Maximum wear-time of 12 hours/day of the contact lenses.   Clean the lenses nightly in contact lens solution.   No sleeping or swimming in the contact lenses.   Replace the lenses monthly.     Return in 1 year for a comprehensive eye exam, or sooner if needed.      The effects of the dilating drops last for 4- 6 hours.  You will be more sensitive to light and vision will be blurry up close.  Mydriatic sunglasses were given if needed.     Salinas Carlin, OD  [facility]  [address] [place] [address]. [place]  [address]    [phone]

## 2022-12-06 NOTE — LETTER
12/6/2022         RE: Mireya Yoon  [address]  [facility] [address]        Dear Colleague,    Thank you for referring your patient, Mireya Yoon, to the [facility]. Please see a copy of my visit note below.    Chief Complaint   Patient presents with     Annual Eye Exam     New Eval For Contact Lens        Previous contact lens wearer? Yes: Biofinity each eye - Toric right eye   Comfort of contact lenses : Good  Satisfied with current lenses: Yes - would like to update CL Rx - OK with $75 fitting fee    Last Eye Exam: 7/31/2020  Dilated Previously: Yes, side effects of dilation explained today    What are you currently using to see?  glasses and contacts - using CL's only ~1x/week    Distance Vision Acuity: Noticed gradual change in both eyes    Near Vision Acuity: Satisfied with vision while reading and using computer with glasses and contacts    Eye Comfort: good  Do you use eye drops? : Yes: Systane PRN with CL's in   Occupation or Hobbies: [employer]      [employer]     Medical, surgical and family histories reviewed and updated 12/6/2022.       OBJECTIVE: See Ophthalmology exam    ASSESSMENT:  No diagnosis found.   PLAN:     Patient Instructions   Updated glasses and contact lens prescriptions provided today.     Biofinity spherical lens each eye.  Maximum wear-time of 12 hours/day of the contact lenses.   Clean the lenses nightly in contact lens solution.   No sleeping or swimming in the contact lenses.   Replace the lenses monthly.     Return in 1 year for a comprehensive eye exam, or sooner if needed.      The effects of the dilating drops last for 4- 6 hours.  You will be more sensitive to light and vision will be blurry up close.  Mydriatic sunglasses were given if needed.     Salinas Carlin, OD  [facility]  [address] [place]  [address]    [phone]                          Again, thank you for allowing me to  participate in the care of your patient.        Sincerely,        Salinas Carlin OD

## 2022-12-06 NOTE — PATIENT INSTRUCTIONS
Updated glasses and contact lens prescriptions provided today.     Biofinity spherical lens each eye.  Maximum wear-time of 12 hours/day of the contact lenses.   Clean the lenses nightly in contact lens solution.   No sleeping or swimming in the contact lenses.   Replace the lenses monthly.     Return in 1 year for a comprehensive eye exam, or sooner if needed.      The effects of the dilating drops last for 4- 6 hours.  You will be more sensitive to light and vision will be blurry up close.  Mydriatic sunglasses were given if needed.     Salinas Carlin, OD  [address] [place]  [address] [phone]

## 2023-01-17 ENCOUNTER — OFFICE VISIT (OUTPATIENT)
Dept: OPTOMETRY | Facility: CLINIC | Age: 23
End: 2023-01-17
Payer: COMMERCIAL

## 2023-01-17 DIAGNOSIS — H52.221 REGULAR ASTIGMATISM OF RIGHT EYE: ICD-10-CM

## 2023-01-17 DIAGNOSIS — H52.11 MYOPIA, RIGHT: Primary | ICD-10-CM

## 2023-01-17 PROCEDURE — 99207 PR NO CHARGE LOS: CPT | Performed by: OPTOMETRIST

## 2023-01-17 ASSESSMENT — REFRACTION_CURRENTRX
OD_CYLINDER: -0.75
OD_BRAND: COOPER BIOFINITY BC 8.6, D 14.0
OD_SPHERE: -2.75
OD_BRAND: COOPER BIOFINITY TORIC BC 8.7, D 14.5
OS_BRAND: COOPER BIOFINITY BC 8.6, D 14.0
OD_SPHERE: -2.50
OD_AXIS: 100
OS_SPHERE: -3.50

## 2023-01-17 ASSESSMENT — REFRACTION_WEARINGRX
OD_SPHERE: -2.75
SPECS_TYPE: SVL
OS_SPHERE: -3.00
OS_CYLINDER: +0.50
OS_AXIS: 134
OD_CYLINDER: +0.75
OD_AXIS: 013

## 2023-01-17 ASSESSMENT — VISUAL ACUITY
OD_SC: 20/200
METHOD: SNELLEN - LINEAR
OD_CC: 20/25
CORRECTION_TYPE: GLASSES
OD_CC: 20/20
OS_CC: 20/30
OD_SC: 20/60
OS_SC: 20/80
OS_SC: 20/250
OS_CC: 20/20
OD_CC+: -2
OS_CC+: +1

## 2023-01-17 ASSESSMENT — CONF VISUAL FIELD
OS_INFERIOR_TEMPORAL_RESTRICTION: 0
OS_SUPERIOR_TEMPORAL_RESTRICTION: 0
OS_SUPERIOR_NASAL_RESTRICTION: 0
OD_SUPERIOR_TEMPORAL_RESTRICTION: 0
OS_INFERIOR_NASAL_RESTRICTION: 0
OD_NORMAL: 1
OD_INFERIOR_TEMPORAL_RESTRICTION: 0
OD_INFERIOR_NASAL_RESTRICTION: 0
OD_SUPERIOR_NASAL_RESTRICTION: 0
METHOD: COUNTING FINGERS
OS_NORMAL: 1

## 2023-01-17 ASSESSMENT — REFRACTION_MANIFEST
OD_CYLINDER: +0.50
OD_AXIS: 022
OD_SPHERE: -3.25

## 2023-01-17 NOTE — PATIENT INSTRUCTIONS
Begin trial of new contact lens power in right eye (back to toric lens).     If trial of the new lens goes well, we can provide you with a new contact lens prescription and [facility] should be able to exchange your lenses based on the new prescription.     Glasses prescription re-checked today. No significant change found, so no updated glasses prescription necessary.       Salinas Carlin, OD  [facility] [place]  [address] [place] [address]. [place]  [place]  [phone]

## 2023-01-17 NOTE — PROGRESS NOTES
Chief Complaint   Patient presents with     Blurred Vision Evaluation       **did not bring new CL's or glasses with her to this appt**    Source of glasses: [facility]  How long have you tried the glasses: ~1 week in glasses, ~4 days in CL's  What is not working with glasses/contacts: Reports left eye is fine in CL's and glasses but is not happy with vision in right eye - feels like fishbowl in glasses. She brought glasses back to [facility] and they think the right eye Rx was increased too much.     Concepción Rodriguez      OBJECTIVE: See Ophthalmology exam    ASSESSMENT:    ICD-10-CM    1. Myopia, right  H52.11       2. Regular astigmatism of right eye  H52.221         PLAN:  Patient Instructions   Begin trial of new contact lens power in right eye (back to toric lens).     If trial of the new lens goes well, we can provide you with a new contact lens prescription and [facility] should be able to exchange your lenses based on the new prescription.     Glasses prescription re-checked today. No significant change found, so no updated glasses prescription necessary.       Salinas Carlin, DANIELA  [address]  [place]  [address]    [phone]

## 2023-01-17 NOTE — LETTER
1/17/2023         RE: Mireya Yoon  [address]  [facility] [address]        Dear Colleague,    Thank you for referring your patient, Mireya Yoon, to the [facility]. Please see a copy of my visit note below.    Chief Complaint   Patient presents with     Blurred Vision Evaluation       **did not bring new CL's or glasses with her to this appt**    Source of glasses: [facility]  How long have you tried the glasses: ~1 week in glasses, ~4 days in CL's  What is not working with glasses/contacts: Reports left eye is fine in CL's and glasses but is not happy with vision in right eye - feels like fishbowl in glasses. She brought glasses back to [facility] and they think the right eye Rx was increased too much.     Concepción Rodriguez      OBJECTIVE: See Ophthalmology exam    ASSESSMENT:    ICD-10-CM    1. Myopia, right  H52.11       2. Regular astigmatism of right eye  H52.221         PLAN:  Patient Instructions   Begin trial of new contact lens power in right eye (back to toric lens).     If trial of the new lens goes well, we can provide you with a new contact lens prescription and [facility] should be able to exchange your lenses based on the new prescription.     Glasses prescription re-checked today. No significant change found, so no updated glasses prescription necessary.       Salinas Carlin OD  [facility]  [address] [place] [address]. [place]  [address] [phone]            Again, thank you for allowing me to participate in the care of your patient.        Sincerely,        Salinas Carlin OD

## 2023-03-14 DIAGNOSIS — F41.1 GAD (GENERALIZED ANXIETY DISORDER): ICD-10-CM

## 2023-03-14 RX ORDER — SERTRALINE HYDROCHLORIDE 100 MG/1
100 TABLET, FILM COATED ORAL DAILY
Qty: 30 TABLET | Refills: 0 | Status: SHIPPED | OUTPATIENT
Start: 2023-03-14 | End: 2023-05-26

## 2023-03-14 NOTE — TELEPHONE ENCOUNTER
Patient calling and requesting a refill of the below pended medication. Patient has scheduled a med check with Dr Ruano on 03/23/23 but was hoping to get a harleen refill until that appointment.    EB Spencer  [facility]

## 2023-03-17 ASSESSMENT — ANXIETY QUESTIONNAIRES
GAD7 TOTAL SCORE: 3
GAD7 TOTAL SCORE: 3
2. NOT BEING ABLE TO STOP OR CONTROL WORRYING: NOT AT ALL
8. IF YOU CHECKED OFF ANY PROBLEMS, HOW DIFFICULT HAVE THESE MADE IT FOR YOU TO DO YOUR WORK, TAKE CARE OF THINGS AT HOME, OR GET ALONG WITH OTHER PEOPLE?: NOT DIFFICULT AT ALL
5. BEING SO RESTLESS THAT IT IS HARD TO SIT STILL: NOT AT ALL
7. FEELING AFRAID AS IF SOMETHING AWFUL MIGHT HAPPEN: NOT AT ALL
1. FEELING NERVOUS, ANXIOUS, OR ON EDGE: SEVERAL DAYS
4. TROUBLE RELAXING: NOT AT ALL
IF YOU CHECKED OFF ANY PROBLEMS ON THIS QUESTIONNAIRE, HOW DIFFICULT HAVE THESE PROBLEMS MADE IT FOR YOU TO DO YOUR WORK, TAKE CARE OF THINGS AT HOME, OR GET ALONG WITH OTHER PEOPLE: NOT DIFFICULT AT ALL
7. FEELING AFRAID AS IF SOMETHING AWFUL MIGHT HAPPEN: NOT AT ALL
6. BECOMING EASILY ANNOYED OR IRRITABLE: SEVERAL DAYS
3. WORRYING TOO MUCH ABOUT DIFFERENT THINGS: SEVERAL DAYS
GAD7 TOTAL SCORE: 3

## 2023-03-17 ASSESSMENT — PATIENT HEALTH QUESTIONNAIRE - PHQ9
SUM OF ALL RESPONSES TO PHQ QUESTIONS 1-9: 4
10. IF YOU CHECKED OFF ANY PROBLEMS, HOW DIFFICULT HAVE THESE PROBLEMS MADE IT FOR YOU TO DO YOUR WORK, TAKE CARE OF THINGS AT HOME, OR GET ALONG WITH OTHER PEOPLE: NOT DIFFICULT AT ALL
SUM OF ALL RESPONSES TO PHQ QUESTIONS 1-9: 4

## 2023-03-23 ENCOUNTER — VIRTUAL VISIT (OUTPATIENT)
Dept: FAMILY MEDICINE | Facility: CLINIC | Age: 23
End: 2023-03-23
Payer: COMMERCIAL

## 2023-03-23 DIAGNOSIS — D50.9 IRON DEFICIENCY ANEMIA, UNSPECIFIED IRON DEFICIENCY ANEMIA TYPE: ICD-10-CM

## 2023-03-23 DIAGNOSIS — E55.9 VITAMIN D DEFICIENCY: ICD-10-CM

## 2023-03-23 DIAGNOSIS — F41.9 ANXIETY: Primary | ICD-10-CM

## 2023-03-23 PROCEDURE — 99213 OFFICE O/P EST LOW 20 MIN: CPT | Mod: VID | Performed by: FAMILY MEDICINE

## 2023-03-23 ASSESSMENT — PATIENT HEALTH QUESTIONNAIRE - PHQ9
SUM OF ALL RESPONSES TO PHQ QUESTIONS 1-9: 4
10. IF YOU CHECKED OFF ANY PROBLEMS, HOW DIFFICULT HAVE THESE PROBLEMS MADE IT FOR YOU TO DO YOUR WORK, TAKE CARE OF THINGS AT HOME, OR GET ALONG WITH OTHER PEOPLE: NOT DIFFICULT AT ALL

## 2023-03-23 ASSESSMENT — ANXIETY QUESTIONNAIRES: GAD7 TOTAL SCORE: 3

## 2023-03-23 NOTE — PROGRESS NOTES
Mireya is a 22 year old who is being evaluated via a billable video visit.      How would you like to obtain your AVS? MyChart  If the video visit is dropped, the invitation should be resent by: Text to cell phone: [phone]  Will anyone else be joining your video visit? No        ICD-10-CM    1. Anxiety  F41.9       2. Iron deficiency anemia, unspecified iron deficiency anemia type  D50.9       3. Vitamin D deficiency  E55.9         Anxiety history and had bee on zoloft since teenage years,  She is seeing therapist now, and is interested in weaning off.  She feels like she has improved and the old job was more stressful and would tolerate being on lower dose  She has reduced herself to 50mg for few weeks now and is doing well.  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  History of Present Illness       Mental Health Follow-up:  Patient presents to follow-up on Anxiety.    Patient's anxiety since last visit has been:  Good  The patient is not having other symptoms associated with anxiety.  Any significant life events: No  Patient is not feeling anxious or having panic attacks.  Patient has no concerns about alcohol or drug use.    She eats 2-3 servings of fruits and vegetables daily.She consumes 0 sweetened beverage(s) daily.She exercises with enough effort to increase her heart rate 20 to 29 minutes per day.  She exercises with enough effort to increase her heart rate 4 days per week.   She is taking medications regularly.    Today's PHQ-9         PHQ-9  Total Score: 4    PHQ-9 Q9 Thoughts of better off dead/self-harm past 2 weeks :   Not at all    How difficult have these problems made it for you to do your work, take care of things at home, or get along with other people: Not difficult at all  Today's MARY-7 Score: 3     Changed job , people incorporate as mental practitioner  Started to take 1/2 of zoloft-few weeks-few months  Has been perfectly fine    She has therapy appoint April  Old co worker has mutual therapist              Review of Systems   Constitutional, HEENT, cardiovascular, pulmonary, GI, , musculoskeletal, neuro, skin, endocrine and psych systems are negative, except as otherwise noted.      Objective           Vitals:  No vitals were obtained today due to virtual visit.    Physical Exam   GENERAL: Healthy, alert and no distress  EYES: Eyes grossly normal to inspection.  No discharge or erythema, or obvious scleral/conjunctival abnormalities.  RESP: No audible wheeze, cough, or visible cyanosis.  No visible retractions or increased work of breathing.    SKIN: Visible skin clear. No significant rash, abnormal pigmentation or lesions.  NEURO: Cranial nerves grossly intact.  Mentation and speech appropriate for age.  PSYCH: Mentation appears normal, affect normal/bright, judgement and insight intact, normal speech and appearance well-groomed.                Video-Visit Details    Type of service:  Video Visit     Originating Location (pt. Location): Home    Distant Location (provider location):  On-site  Platform used for Video Visit: [employer]

## 2023-06-02 ENCOUNTER — TELEPHONE (OUTPATIENT)
Dept: FAMILY MEDICINE | Facility: CLINIC | Age: 23
End: 2023-06-02
Payer: COMMERCIAL

## 2023-06-02 DIAGNOSIS — F41.9 ANXIETY: ICD-10-CM

## 2023-06-02 RX ORDER — SERTRALINE HYDROCHLORIDE 25 MG/1
25 TABLET, FILM COATED ORAL DAILY
Qty: 30 TABLET | Refills: 0 | Status: SHIPPED | OUTPATIENT
Start: 2023-06-02 | End: 2023-06-29

## 2023-06-02 NOTE — TELEPHONE ENCOUNTER
See Mychart encounter from 6/2. PCP already signed order. RN changing pharmacy location.    RN called previous location and had them cancel rx     RN sending to requested pharmacy    RN called patient/family and relayed provider's message. Patient/family verbalized understanding.     Christine Humphrey, RN, BSN  [facility]: [place]

## 2023-06-02 NOTE — TELEPHONE ENCOUNTER
Patient called the medication sertraline (ZOLOFT) 25 MG tablet was sent to wrong pharmacy needs to be sent to [id number] [place] [place] - [address]

## 2023-06-06 ENCOUNTER — VIRTUAL VISIT (OUTPATIENT)
Dept: FAMILY MEDICINE | Facility: CLINIC | Age: 23
End: 2023-06-06
Payer: COMMERCIAL

## 2023-06-06 DIAGNOSIS — F41.1 GAD (GENERALIZED ANXIETY DISORDER): ICD-10-CM

## 2023-06-06 DIAGNOSIS — R51.9 FRONTAL HEADACHE: Primary | ICD-10-CM

## 2023-06-06 PROCEDURE — 99214 OFFICE O/P EST MOD 30 MIN: CPT | Mod: VID | Performed by: FAMILY MEDICINE

## 2023-06-06 RX ORDER — SERTRALINE HYDROCHLORIDE 25 MG/1
25 TABLET, FILM COATED ORAL DAILY
Qty: 30 TABLET | Refills: 0 | Status: SHIPPED | OUTPATIENT
Start: 2023-06-06 | End: 2023-08-08

## 2023-06-06 RX ORDER — SERTRALINE HYDROCHLORIDE 100 MG/1
100 TABLET, FILM COATED ORAL DAILY
Qty: 30 TABLET | Refills: 0 | Status: CANCELLED | OUTPATIENT
Start: 2023-06-06

## 2023-06-06 RX ORDER — SUMATRIPTAN 50 MG/1
50 TABLET, FILM COATED ORAL
Qty: 8 TABLET | Refills: 0 | Status: SHIPPED | OUTPATIENT
Start: 2023-06-06

## 2023-06-06 NOTE — PROGRESS NOTES
Mireya is a 22 year old who is being evaluated via a billable video visit.      How would you like to obtain your AVS? MyChart  If the video visit is dropped, the invitation should be resent by: Text to cell phone: [phone]  Will anyone else be joining your video visit? No          ICD-10-CM    1. Frontal headache  R51.9 SUMAtriptan (IMITREX) 50 MG tablet      2. MARY (generalized anxiety disorder)  F41.1 sertraline (ZOLOFT) 25 MG tablet        Patient with history of generalized anxiety-patient has been on Zoloft and therapy for a while and feels like she does not need her medication now and has been trying to wean off.  She had done well on 50 mg of Zoloft for 3 months and now is on 25 mg.  She will try to wean off in 3 months off of medication and see how she feels.  Advised continuing therapy every other week like she has been.    History of headaches patient has had imaging test of her head and seen neurology in the past.  Patient did not use the Topamax that was prescribed for her as her headaches resolved.  Patient is noticing the last few weeks she has been having some headaches similar to her previous.  She does have some frontal head pressure bilaterally with no neurological symptoms not the worst headache of her life.  She does have some signs of light sensitivity.  Patient is to keep a log of her symptoms and triggers.  Patient is to try a trial of Imitrex as needed.  Follow-up 1 month if persisting.  She is due for her wellness visit as well.  She is to schedule that appointment.  Subjective   Mireya is a 22 year old, presenting for the following health issues:   Follow Up         View : No data to display.              History of Present Illness       Reason for visit:  Medication    She eats 2-3 servings of fruits and vegetables daily.She consumes 0 sweetened beverage(s) daily.She exercises with enough effort to increase her heart rate 30 to 60 minutes per day.  She exercises with enough effort to  increase her heart rate 3 or less days per week.   She is taking medications regularly.       Depression and Anxiety Follow-Up    How are you doing with your depression since your last visit? No change    How are you doing with your anxiety since your last visit?  No change    Are you having other symptoms that might be associated with depression or anxiety? No    Have you had a significant life event? No     Do you have any concerns with your use of alcohol or other drugs? No    Social History     Tobacco Use     Smoking status: Never     Smokeless tobacco: Never   Vaping Use     Vaping status: Never Used   Substance Use Topics     Alcohol use: Yes     Comment: seldom/rare     Drug use: No         5/4/2021     2:21 PM 3/17/2023     3:38 PM 6/6/2023    10:26 AM   PHQ   PHQ-9 Total Score 4 4 3   Q9: Thoughts of better off dead/self-harm past 2 weeks Not at all Not at all Not at all         12/23/2021    12:58 PM 3/17/2023     3:40 PM 6/6/2023    10:25 AM   MARY-7 SCORE   Total Score 4 (minimal anxiety) 3 (minimal anxiety) 4 (minimal anxiety)   Total Score  3 4         6/6/2023    10:26 AM   Last PHQ-9   1.  Little interest or pleasure in doing things 0   2.  Feeling down, depressed, or hopeless 1   3.  Trouble falling or staying asleep, or sleeping too much 0   4.  Feeling tired or having little energy 1   5.  Poor appetite or overeating 0   6.  Feeling bad about yourself 1   7.  Trouble concentrating 0   8.  Moving slowly or restless 0   Q9: Thoughts of better off dead/self-harm past 2 weeks 0   PHQ-9 Total Score 3         6/6/2023    10:25 AM   MARY-7    1. Feeling nervous, anxious, or on edge 1   2. Not being able to stop or control worrying 0   3. Worrying too much about different things 1   4. Trouble relaxing 0   5. Being so restless that it is hard to sit still 0   6. Becoming easily annoyed or irritable 1   7. Feeling afraid, as if something awful might happen 1   MARY-7 Total Score 4   If you checked any  problems, how difficult have they made it for you to do your work, take care of things at home, or get along with other people? Not difficult at all       everyother week and it is going  She has weaned to 25mg  She has worked with her tools and is helping   She has grown with her  She was on 50mg    Suicide Assessment Five-step Evaluation and Treatment (SAFE-T)          Review of Systems   Constitutional, HEENT, cardiovascular, pulmonary, GI, , musculoskeletal, neuro, skin, endocrine and psych systems are negative, except as otherwise noted.      Objective           Vitals:  No vitals were obtained today due to virtual visit.    Physical Exam   GENERAL: Healthy, alert and no distress  EYES: Eyes grossly normal to inspection.  No discharge or erythema, or obvious scleral/conjunctival abnormalities.  RESP: No audible wheeze, cough, or visible cyanosis.  No visible retractions or increased work of breathing.    SKIN: Visible skin clear. No significant rash, abnormal pigmentation or lesions.  NEURO: Cranial nerves grossly intact.  Mentation and speech appropriate for age.  PSYCH: Mentation appears normal, affect normal/bright, judgement and insight intact, normal speech and appearance well-groomed.            Video-Visit Details    Type of service:  Video Visit     Originating Location (pt. Location): Home  Distant Location (provider location):  On-site  Platform used for Video Visit: Delvin

## 2023-06-29 DIAGNOSIS — F41.9 ANXIETY: ICD-10-CM

## 2023-06-29 RX ORDER — SERTRALINE HYDROCHLORIDE 25 MG/1
TABLET, FILM COATED ORAL
Qty: 30 TABLET | Refills: 0 | Status: SHIPPED | OUTPATIENT
Start: 2023-06-29 | End: 2023-08-08

## 2023-07-15 ENCOUNTER — E-VISIT (OUTPATIENT)
Dept: FAMILY MEDICINE | Facility: CLINIC | Age: 23
End: 2023-07-15
Payer: COMMERCIAL

## 2023-07-15 DIAGNOSIS — J01.90 ACUTE SINUSITIS, RECURRENCE NOT SPECIFIED, UNSPECIFIED LOCATION: Primary | ICD-10-CM

## 2023-07-15 PROCEDURE — 99421 OL DIG E/M SVC 5-10 MIN: CPT | Performed by: FAMILY MEDICINE

## 2023-07-17 ENCOUNTER — PATIENT OUTREACH (OUTPATIENT)
Dept: CARE COORDINATION | Facility: CLINIC | Age: 23
End: 2023-07-17
Payer: COMMERCIAL

## 2023-07-17 RX ORDER — AZITHROMYCIN 250 MG/1
TABLET, FILM COATED ORAL
Qty: 6 TABLET | Refills: 0 | Status: SHIPPED | OUTPATIENT
Start: 2023-07-17 | End: 2023-07-22

## 2023-07-17 RX ORDER — FLUTICASONE PROPIONATE 50 MCG
1 SPRAY, SUSPENSION (ML) NASAL DAILY
Qty: 16 G | Refills: 0 | Status: SHIPPED | OUTPATIENT
Start: 2023-07-17 | End: 2023-09-12

## 2023-07-23 RX ORDER — PREDNISONE 20 MG/1
20 TABLET ORAL 2 TIMES DAILY
Qty: 10 TABLET | Refills: 0 | Status: SHIPPED | OUTPATIENT
Start: 2023-07-23 | End: 2023-07-28

## 2023-08-02 ASSESSMENT — ENCOUNTER SYMPTOMS
DIARRHEA: 0
HEARTBURN: 0
DYSURIA: 0
DIZZINESS: 0
FEVER: 0
SORE THROAT: 0
CONSTIPATION: 0
CHILLS: 0
PARESTHESIAS: 0
HEMATOCHEZIA: 0
COUGH: 0
EYE PAIN: 0
NERVOUS/ANXIOUS: 0
PALPITATIONS: 0
NAUSEA: 0
BREAST MASS: 0
ARTHRALGIAS: 0
FREQUENCY: 0
JOINT SWELLING: 0
HEMATURIA: 0
WEAKNESS: 0
ABDOMINAL PAIN: 0
HEADACHES: 1
SHORTNESS OF BREATH: 0
MYALGIAS: 0

## 2023-08-08 ENCOUNTER — OFFICE VISIT (OUTPATIENT)
Dept: FAMILY MEDICINE | Facility: CLINIC | Age: 23
End: 2023-08-08
Payer: COMMERCIAL

## 2023-08-08 VITALS
BODY MASS INDEX: 29.98 KG/M2 | HEART RATE: 90 BPM | HEIGHT: 67 IN | RESPIRATION RATE: 15 BRPM | SYSTOLIC BLOOD PRESSURE: 133 MMHG | WEIGHT: 191 LBS | TEMPERATURE: 99.2 F | OXYGEN SATURATION: 100 % | DIASTOLIC BLOOD PRESSURE: 84 MMHG

## 2023-08-08 DIAGNOSIS — Z13.220 SCREENING CHOLESTEROL LEVEL: ICD-10-CM

## 2023-08-08 DIAGNOSIS — F41.9 ANXIETY: ICD-10-CM

## 2023-08-08 DIAGNOSIS — Z00.00 ENCOUNTER FOR ANNUAL PHYSICAL EXAM: Primary | ICD-10-CM

## 2023-08-08 DIAGNOSIS — Z13.1 SCREENING FOR DIABETES MELLITUS: ICD-10-CM

## 2023-08-08 DIAGNOSIS — J32.0 CHRONIC MAXILLARY SINUSITIS: ICD-10-CM

## 2023-08-08 PROCEDURE — 99214 OFFICE O/P EST MOD 30 MIN: CPT | Mod: 25

## 2023-08-08 PROCEDURE — 99395 PREV VISIT EST AGE 18-39: CPT

## 2023-08-08 ASSESSMENT — ENCOUNTER SYMPTOMS
HEMATOCHEZIA: 0
FEVER: 0
ARTHRALGIAS: 0
EYE PAIN: 0
ABDOMINAL PAIN: 0
DIZZINESS: 0
CONSTIPATION: 0
HEADACHES: 1
MYALGIAS: 0
CHILLS: 0
DIARRHEA: 0
SHORTNESS OF BREATH: 0
DYSURIA: 0
JOINT SWELLING: 0
COUGH: 0
BREAST MASS: 0
FREQUENCY: 0
HEARTBURN: 0
PARESTHESIAS: 0
NAUSEA: 0
SORE THROAT: 0
HEMATURIA: 0
NERVOUS/ANXIOUS: 0
WEAKNESS: 0
PALPITATIONS: 0

## 2023-08-08 ASSESSMENT — PAIN SCALES - GENERAL: PAINLEVEL: NO PAIN (0)
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
## 2023-09-12 DIAGNOSIS — J01.90 ACUTE SINUSITIS, RECURRENCE NOT SPECIFIED, UNSPECIFIED LOCATION: ICD-10-CM

## 2023-09-12 RX ORDER — FLUTICASONE PROPIONATE 50 MCG
1 SPRAY, SUSPENSION (ML) NASAL DAILY
Qty: 16 G | Refills: 0 | Status: SHIPPED | OUTPATIENT
Start: 2023-09-12 | End: 2023-11-14

## 2023-11-14 DIAGNOSIS — J01.90 ACUTE SINUSITIS, RECURRENCE NOT SPECIFIED, UNSPECIFIED LOCATION: ICD-10-CM

## 2023-11-14 RX ORDER — FLUTICASONE PROPIONATE 50 MCG
1 SPRAY, SUSPENSION (ML) NASAL DAILY
Qty: 16 G | Refills: 0 | Status: SHIPPED | OUTPATIENT
Start: 2023-11-14 | End: 2024-01-16

## 2023-12-08 DIAGNOSIS — F41.9 ANXIETY: ICD-10-CM

## 2023-12-08 NOTE — TELEPHONE ENCOUNTER
Pending Prescriptions:                       Disp   Refills    sertraline (ZOLOFT) 50 MG tablet          30 tab*3            Sig: Take 1 tablet (50 mg) by mouth daily

## 2023-12-09 NOTE — PATIENT INSTRUCTIONS
Dear Mireya Yoon    After reviewing your responses, I've been able to diagnose you with Acute sinusitis, recurrence not specified, unspecified location.      Based on your responses and diagnosis, I have prescribed No orders of the defined types were placed in this encounter.   to treat your symptoms. I have sent this to your pharmacy.?     It is also important to stay well hydrated, get lots of rest and take over-the-counter decongestants,?tylenol?or ibuprofen if you?are able to?take those medications per your primary care provider to help relieve discomfort.?     It is important that you take?all of?your prescribed medication even if your symptoms are improving after a few doses.? Taking?all of?your medicine helps prevent the symptoms from returning.?     If your symptoms worsen, you develop severe headache, vomiting, high fever (>102), or are not improving in 7 days, please contact your primary care provider for an appointment or visit any of our convenient Walk-in Care or Urgent Care Centers to be seen which can be found on our website?here.?     Thanks again for choosing?us?as your health care partner,?   ?  Cheli Ruano, DO?    Pt arrived to ED BIB  , who stated pt was visiting friends this morning and around 0900 had difficulty speaking and Rt facial droop   On arrival to ED Pt arrived to ED BIB  , who stated pt was visiting friends this morning and around 0900 had difficulty speaking and Rt facial droop   On arrival to ED, pt is alert, oriented x 3 , ambulates independently and has mild aphasia

## 2023-12-19 ENCOUNTER — ANCILLARY PROCEDURE (OUTPATIENT)
Dept: GENERAL RADIOLOGY | Facility: CLINIC | Age: 23
End: 2023-12-19
Attending: NURSE PRACTITIONER
Payer: COMMERCIAL

## 2023-12-19 ENCOUNTER — OFFICE VISIT (OUTPATIENT)
Dept: PHYSICAL MEDICINE AND REHAB | Facility: CLINIC | Age: 23
End: 2023-12-19
Payer: COMMERCIAL

## 2023-12-19 VITALS
SYSTOLIC BLOOD PRESSURE: 127 MMHG | WEIGHT: 193.1 LBS | BODY MASS INDEX: 30.49 KG/M2 | OXYGEN SATURATION: 98 % | DIASTOLIC BLOOD PRESSURE: 77 MMHG | HEART RATE: 91 BPM

## 2023-12-19 DIAGNOSIS — G89.29 CHRONIC BILATERAL LOW BACK PAIN WITHOUT SCIATICA: ICD-10-CM

## 2023-12-19 DIAGNOSIS — M25.552 HIP PAIN, LEFT: ICD-10-CM

## 2023-12-19 DIAGNOSIS — M62.838 MUSCLE SPASM: Primary | ICD-10-CM

## 2023-12-19 DIAGNOSIS — M54.50 CHRONIC BILATERAL LOW BACK PAIN WITHOUT SCIATICA: ICD-10-CM

## 2023-12-19 DIAGNOSIS — M62.838 MUSCLE SPASM: ICD-10-CM

## 2023-12-19 DIAGNOSIS — M53.3 SACROILIAC JOINT PAIN: ICD-10-CM

## 2023-12-19 PROCEDURE — 99204 OFFICE O/P NEW MOD 45 MIN: CPT | Performed by: NURSE PRACTITIONER

## 2023-12-19 PROCEDURE — 72100 X-RAY EXAM L-S SPINE 2/3 VWS: CPT | Performed by: STUDENT IN AN ORGANIZED HEALTH CARE EDUCATION/TRAINING PROGRAM

## 2023-12-19 PROCEDURE — 73522 X-RAY EXAM HIPS BI 3-4 VIEWS: CPT | Performed by: RADIOLOGY

## 2023-12-19 RX ORDER — ERGOCALCIFEROL (VITAMIN D2) 10 MCG
TABLET ORAL
COMMUNITY

## 2023-12-19 RX ORDER — ASCORBIC ACID 100 MG
TABLET,CHEWABLE ORAL
COMMUNITY

## 2023-12-19 RX ORDER — METHOCARBAMOL 500 MG/1
500 TABLET, FILM COATED ORAL 4 TIMES DAILY PRN
Qty: 30 TABLET | Refills: 0 | Status: SHIPPED | OUTPATIENT
Start: 2023-12-19

## 2023-12-19 RX ORDER — UBIDECARENONE 75 MG
100 CAPSULE ORAL DAILY
COMMUNITY
End: 2024-09-24

## 2023-12-19 ASSESSMENT — PAIN SCALES - GENERAL: PAINLEVEL: MODERATE PAIN (4)

## 2023-12-19 NOTE — LETTER
12/19/2023       RE: Mireya Yoon  [address]  [facility] [address]       Dear Colleague,    Thank you for referring your patient, Mireya Yoon, to the [facility] [place] at [facility]. Please see a copy of my visit note below.    Patient seen at the request of No ref. provider found for an opinion and evaluation of low back pain .      HISTORY OF PRESENT ILLNESS:  Mireya Yoon is a 23 year old female who presents with a chief complaint of low back pain.     She was seen today in the clinic, accompanied by her boyfriend.    She reports that around 4 months ago she began having bilateral low back pain.  At the time, she was very active doing classes at the gym.  She says the classes did not involve any particularly heavy lifting (max 12 pounds).  She cannot recall any particular injury.  She has since stopped the exercise classes.     Initially, she says the pain was quite significant and she was not able to put on her socks by herself.  She began following with a chiropractor doing electrical stimulation type of therapy along with adjustments and that helped.    Overall, she states the pain has improved since the initial onset. However the pain ebbs and flows from day-to-day and does not ever completely go away.    She describes nonradiating, bilateral low back pain.  In lumbar flexion, she has more pain on the left side.  The pain is also exacerbated with twisting, getting out of bed in the morning, stretching, or sitting on the toilet.  The pain tends to be worse in the morning.  Relieving factors include sitting, laying down, or being still.  Today she rates the pain 4/10.  At its worst over the last week, the pain was 7/10.  Her sleep has not been affected.    Of note, she did physical therapy for left low back/hip discomfort 3 years ago and found it helpful.  She states the quality of the pain this time is  somewhat different than it was then.     Additionally, she feels as though her left hip tends to be chronically twisted or in some way oriented differently than the right side.  She finds that most noticeable when walking.    She denies falls, weakness, bowel or bladder incontinence, saddle anesthesia, unintentional weight loss, fevers/chills, or night sweats.       PRIOR INJURIES/TREATMENT:   Ice/Heat: ice - no relief    Brace: none    Physical Therapy:   PT in 2022 for thoracic spine pain.  PT in 2020 for left-sided low back pain     - Current Pain Medications -   Tylenol as needed  Naproxen as needed  Ibuprofen as needed  *Sertraline,   *Sumatriptan, as needed for migraine    - Prior/Trialed Pain Medications -   None      Prior Procedures:  Date    Procedure   Improvement (%)  none              Prior Related Surgery: none     Other (acupuncture, OMT, CMM, TENS, DME, etc.):   Chiropractor occasionally, does stimulation and HVA. Does help    Specialists Seen - (with most recent, available notes and clinic visits reviewed)   1. Sports medicine-2014-left knee   2.  Neurology-headache clinic    IMAGING - reviewed   None    Review Of Systems:  I am responding to those symptoms which are directly relevant to the specific indication for my consultation. I recommend that the patient follow up with their primary or referring provider to pursue any other symptoms which may be of concern.       Medical History:  Anxiety     She Past Surgical History:  08/22/2022: NO HISTORY OF SURGERY    Family History  Her family history includes Diabetes in her maternal grandmother; Gallbladder Disease in her mother; Hypertension in her mother; Unknown/Adopted in her father.     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 Performs ADLs/IADLs independently.    She  reports that she has never smoked. She has never used smokeless  tobacco. She reports current alcohol use. She reports that she does not use drugs.        Current Medications:   She Current Outpatient Medications:  Ascorbic Acid (VITAMIN C) 100 MG CHEW, , Disp: , Rfl:   cyanocobalamin (VITAMIN B-12) 100 MCG tablet, Take 100 mcg by mouth daily Unknown dosage, Disp: , Rfl:   fluticasone (FLONASE) 50 MCG/ACT nasal spray, SHAKE LIQUID AND USE 1 SPRAY IN EACH NOSTRIL DAILY, Disp: 16 g, Rfl: 0  levonorgestrel (KYLEENA) 19.5 MG IUD, 1 each (19.5 mg) by Intrauterine route once, Disp: , Rfl:   sertraline (ZOLOFT) 50 MG tablet, Take 1 tablet (50 mg) by mouth daily, Disp: 90 tablet, Rfl: 2  SUMAtriptan (IMITREX) 50 MG tablet, Take 1 tablet (50 mg) by mouth at onset of headache for migraine May repeat in 2 hours. Max 4 tablets/24 hours., Disp: 8 tablet, Rfl: 0  Vitamin D, Cholecalciferol, 10 MCG (400 UNIT) TABS, Unknown dosage, Disp: , Rfl:          Allergies:    -- Seasonal Allergies     PHYSICAL EXAMINATION:  /77 (BP Location: Right arm, Patient Position: Sitting, Cuff Size: Adult Regular)   Pulse 91   Wt 87.6 kg (193 lb 1.6 oz)   SpO2 98%   BMI 30.49 kg/m     --CONSTITUTIONAL: Vital signs as above. No acute distress. The patient is well nourished and well groomed.  --PSYCHIATRIC: The patient is awake, alert, oriented to person, place, time and answering questions appropriately with clear speech. Appropriate mood and affect   --SKIN:  Posterior torso is clean, dry, intact without rashes.   --RESPIRATORY: Normal rhythm and effort. No abnormal accessory muscle breathing patterns noted.   --GROSS MOTOR:  Easily arises from a seated position. Observed walking the length of the vance with a nonantalgic gait.  Toe walking and heel walking are normal.  She stands at the counter for stability, standing on one leg, lifting up onto her toes 5 times on each side without difficulty.  --STANDING EXAMINATION: Normal lumbar lordosis noted, no lateral shift.  --LOWER EXTREMITY MOTOR  TESTING:  Plantar flexion left 5/5, right 5/5   Dorsiflexion left 5/5, right 5/5   Great toe MTP extension left 5/5, right 5/5  Knee flexion left 5/5, right 5/5  Knee extension left 5/5, right 5/5   Hip flexion left 5/5, right 5/5  --MUSCULOSKELETAL: Lumbar spine inspection reveals no evidence of deformity. Range of motion is not limited in lumbar flexion, extension, lateral rotation.   Lumbar flexion elicits pain.  No tenderness to palpation lumbar spine. Straight leg raise negative bilaterally.   Facet loading maneuvers are positive bilaterally.  She indicates pain around the L4, L5, S1 area at the bilateral low back  --SACROILIAC JOINT: Brendon positive proximal SI?.  No pain with palpation of the SI joint.  Distraction negative. Sabas's on the left elicits left-sided low back pain. Gaenslen's negative bilaterally.  Thigh thrust test is negative bilaterally. Sacroiliac Joint Compression Test negative bilaterally. Sacral Thrust/Yeoman's Test positive bilaterally.  --HIPS: Full range of motion bilaterally.  Sabas's positive on the left and negative on the right no pain with logrolling. No pain with palpation of the greater trochanters.   --NEUROLOGIC: 2/4 patellar and achilles reflexes bilaterally.  Sensation to light touch is intact in the bilateral L4, L5, and S1 dermatomes. No clonus.    --VASCULAR:  Warm lower limbs bilaterally. There is no pitting edema of the bilateral lower extremities. 2/4 dorsalis pedis and posterior tibialsi pulses bilaterally. Capillary refill in the lower extremities is less than 1 second.      ASSESSMENT:  Mireya Yoon is a pleasant 23 year old female who presents with a 4-month history of atraumatic, localized, nonradiating, bilateral low back pain which has improved over time but has remained persistent and varies day-to-day.  Neurologic exam is reassuring.  There are no red flags.  Suspect the pain may be related to the SI joint.  Facet mediated pain, disc bulge, or intra-articular  hip related pain are other possibilities.    PLAN:  -Add x-rays of the lumbar spine and pelvis/hips, which can be done after today's visit  -Add a referral to physical therapy to establish home exercise program.  -Add methocarbamol as needed, up to 4 times daily.  We discussed avoiding activities which could be dangerous, including driving or using alcohol with this medication and she expressed understanding.  -RTC after 6 weeks of PT in the home exercise program, or sooner if needed or with new or worsening pain    Ready to learn, no apparent learning barriers.  Education provided on treatment plan according to patient's preferred learning style.  Patient verbalizes understanding.   __________________________________      Again, thank you for allowing me to participate in the care of your patient.      Sincerely,    SHANTELL Carson CNP

## 2023-12-19 NOTE — PATIENT INSTRUCTIONS
It was a pleasure seeing you in the clinic today.  As discussed, we made the following updates to your plan of care:       XR orders were added today.  I will send you a message about the x-ray result through [email or web address].     An order for physical therapy was added today. Physical therapy schedulers should call you in the next few days to schedule an appointment. You may also call [phone] to arrange an appointment at any of the [facility] outpatient physical therapy locations.  It will be very important for you to do the physical therapy exercises on a regular basis to decrease your pain and prevent future flares of pain.     Methocarbamol was prescribed today.  This is a muscle relaxer that may be taken for muscle tightness and pain as needed.  This drug can cause sedation / drowsiness, so when taking this medication avoid driving or other tasks that require you to be alert. No alcohol with this medication.      Let's follow up after 6 weeks of physical therapy and home exercise program to see how you are doing and talk about next steps.           Thank you,  Alicia Galeas NP  Physical Medicine and Rehabilitation, Medical Spine  PM&R clinic Phone: [phone]  PM[facility]R clinic Fax: [phone]

## 2023-12-19 NOTE — PROGRESS NOTES
Patient seen at the request of No ref. provider found for an opinion and evaluation of low back pain .      HISTORY OF PRESENT ILLNESS:  Mireya Yoon is a 23 year old female who presents with a chief complaint of low back pain.     She was seen today in the clinic, accompanied by her boyfriend.    She reports that around 4 months ago she began having bilateral low back pain.  At the time, she was very active doing classes at the gym.  She says the classes did not involve any particularly heavy lifting (max 12 pounds).  She cannot recall any particular injury.  She has since stopped the exercise classes.     Initially, she says the pain was quite significant and she was not able to put on her socks by herself.  She began following with a chiropractor doing electrical stimulation type of therapy along with adjustments and that helped.    Overall, she states the pain has improved since the initial onset. However the pain ebbs and flows from day-to-day and does not ever completely go away.    She describes nonradiating, bilateral low back pain.  In lumbar flexion, she has more pain on the left side.  The pain is also exacerbated with twisting, getting out of bed in the morning, stretching, or sitting on the toilet.  The pain tends to be worse in the morning.  Relieving factors include sitting, laying down, or being still.  Today she rates the pain 4/10.  At its worst over the last week, the pain was 7/10.  Her sleep has not been affected.    Of note, she did physical therapy for left low back/hip discomfort 3 years ago and found it helpful.  She states the quality of the pain this time is somewhat different than it was then.     Additionally, she feels as though her left hip tends to be chronically twisted or in some way oriented differently than the right side.  She finds that most noticeable when walking.    She denies falls, weakness, bowel or bladder incontinence, saddle anesthesia, unintentional weight loss,  fevers/chills, or night sweats.       PRIOR INJURIES/TREATMENT:   Ice/Heat: ice - no relief    Brace: none    Physical Therapy:   PT in 2022 for thoracic spine pain.  PT in 2020 for left-sided low back pain     - Current Pain Medications -   Tylenol as needed  Naproxen as needed  Ibuprofen as needed  *Sertraline,   *Sumatriptan, as needed for migraine    - Prior/Trialed Pain Medications -   None      Prior Procedures:  Date    Procedure   Improvement (%)  none              Prior Related Surgery: none     Other (acupuncture, OMT, CMM, TENS, DME, etc.):   Chiropractor occasionally, does stimulation and HVA. Does help    Specialists Seen - (with most recent, available notes and clinic visits reviewed)   1. Sports medicine-2014-left knee   2.  Neurology-headache clinic    IMAGING - reviewed   None    Review Of Systems:  I am responding to those symptoms which are directly relevant to the specific indication for my consultation. I recommend that the patient follow up with their primary or referring provider to pursue any other symptoms which may be of concern.       Medical History:  Anxiety     She Past Surgical History:  08/22/2022: NO HISTORY OF SURGERY    Family History  Her family history includes Diabetes in her maternal grandmother; Gallbladder Disease in her mother; Hypertension in her mother; Unknown/Adopted in her father.     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 Performs ADLs/IADLs independently.    She  reports that she has never smoked. She has never used smokeless tobacco. She reports current alcohol use. She reports that she does not use drugs.        Current Medications:   She Current Outpatient Medications:  Ascorbic Acid (VITAMIN C) 100 MG CHEW, , Disp: , Rfl:   cyanocobalamin (VITAMIN B-12) 100 MCG tablet, Take 100 mcg by mouth daily Unknown dosage, Disp: , Rfl:   fluticasone (FLONASE) 50  MCG/ACT nasal spray, SHAKE LIQUID AND USE 1 SPRAY IN EACH NOSTRIL DAILY, Disp: 16 g, Rfl: 0  levonorgestrel (KYLEENA) 19.5 MG IUD, 1 each (19.5 mg) by Intrauterine route once, Disp: , Rfl:   sertraline (ZOLOFT) 50 MG tablet, Take 1 tablet (50 mg) by mouth daily, Disp: 90 tablet, Rfl: 2  SUMAtriptan (IMITREX) 50 MG tablet, Take 1 tablet (50 mg) by mouth at onset of headache for migraine May repeat in 2 hours. Max 4 tablets/24 hours., Disp: 8 tablet, Rfl: 0  Vitamin D, Cholecalciferol, 10 MCG (400 UNIT) TABS, Unknown dosage, Disp: , Rfl:          Allergies:    -- Seasonal Allergies     PHYSICAL EXAMINATION:  /77 (BP Location: Right arm, Patient Position: Sitting, Cuff Size: Adult Regular)   Pulse 91   Wt 87.6 kg (193 lb 1.6 oz)   SpO2 98%   BMI 30.49 kg/m     --CONSTITUTIONAL: Vital signs as above. No acute distress. The patient is well nourished and well groomed.  --PSYCHIATRIC: The patient is awake, alert, oriented to person, place, time and answering questions appropriately with clear speech. Appropriate mood and affect   --SKIN:  Posterior torso is clean, dry, intact without rashes.   --RESPIRATORY: Normal rhythm and effort. No abnormal accessory muscle breathing patterns noted.   --GROSS MOTOR:  Easily arises from a seated position. Observed walking the length of the vance with a nonantalgic gait.  Toe walking and heel walking are normal.  She stands at the counter for stability, standing on one leg, lifting up onto her toes 5 times on each side without difficulty.  --STANDING EXAMINATION: Normal lumbar lordosis noted, no lateral shift.  --LOWER EXTREMITY MOTOR TESTING:  Plantar flexion left 5/5, right 5/5   Dorsiflexion left 5/5, right 5/5   Great toe MTP extension left 5/5, right 5/5  Knee flexion left 5/5, right 5/5  Knee extension left 5/5, right 5/5   Hip flexion left 5/5, right 5/5  --MUSCULOSKELETAL: Lumbar spine inspection reveals no evidence of deformity. Range of motion is not limited in lumbar  flexion, extension, lateral rotation.   Lumbar flexion elicits pain.  No tenderness to palpation lumbar spine. Straight leg raise negative bilaterally.   Facet loading maneuvers are positive bilaterally.  She indicates pain around the L4, L5, S1 area at the bilateral low back  --SACROILIAC JOINT: Brendon positive proximal SI?.  No pain with palpation of the SI joint.  Distraction negative. Sabas's on the left elicits left-sided low back pain. Gaenslen's negative bilaterally.  Thigh thrust test is negative bilaterally. Sacroiliac Joint Compression Test negative bilaterally. Sacral Thrust/Yeoman's Test positive bilaterally.  --HIPS: Full range of motion bilaterally.  Sabas's positive on the left and negative on the right no pain with logrolling. No pain with palpation of the greater trochanters.   --NEUROLOGIC: 2/4 patellar and achilles reflexes bilaterally.  Sensation to light touch is intact in the bilateral L4, L5, and S1 dermatomes. No clonus.    --VASCULAR:  Warm lower limbs bilaterally. There is no pitting edema of the bilateral lower extremities. 2/4 dorsalis pedis and posterior tibialsi pulses bilaterally. Capillary refill in the lower extremities is less than 1 second.      ASSESSMENT:  Mireya Yoon is a pleasant 23 year old female who presents with a 4-month history of atraumatic, localized, nonradiating, bilateral low back pain which has improved over time but has remained persistent and varies day-to-day.  Neurologic exam is reassuring.  There are no red flags.  Suspect the pain may be related to the SI joint.  Facet mediated pain, disc bulge, or intra-articular hip related pain are other possibilities.    PLAN:  -Add x-rays of the lumbar spine and pelvis/hips, which can be done after today's visit  -Add a referral to physical therapy to establish home exercise program.  -Add methocarbamol as needed, up to 4 times daily.  We discussed avoiding activities which could be dangerous, including driving or using  alcohol with this medication and she expressed understanding.  -RTC after 6 weeks of PT in the home exercise program, or sooner if needed or with new or worsening pain    Ready to learn, no apparent learning barriers.  Education provided on treatment plan according to patient's preferred learning style.  Patient verbalizes understanding.   __________________________________  Alicia Galeas NP  Physical Medicine & Rehabilitation

## 2023-12-20 ENCOUNTER — VIRTUAL VISIT (OUTPATIENT)
Dept: PHYSICAL MEDICINE AND REHAB | Facility: CLINIC | Age: 23
End: 2023-12-20
Payer: COMMERCIAL

## 2023-12-20 DIAGNOSIS — M54.50 CHRONIC BILATERAL LOW BACK PAIN WITHOUT SCIATICA: Primary | ICD-10-CM

## 2023-12-20 DIAGNOSIS — M53.3 SCLEROSIS OF SACROILIAC JOINT: ICD-10-CM

## 2023-12-20 DIAGNOSIS — M53.3 SACROILIAC JOINT PAIN: ICD-10-CM

## 2023-12-20 DIAGNOSIS — G89.29 CHRONIC BILATERAL LOW BACK PAIN WITHOUT SCIATICA: Primary | ICD-10-CM

## 2023-12-20 DIAGNOSIS — M62.838 MUSCLE SPASM: ICD-10-CM

## 2023-12-20 PROCEDURE — 99214 OFFICE O/P EST MOD 30 MIN: CPT | Mod: VID | Performed by: NURSE PRACTITIONER

## 2023-12-20 RX ORDER — METHYLPREDNISOLONE 4 MG
TABLET, DOSE PACK ORAL
Qty: 21 TABLET | Refills: 0 | Status: SHIPPED | OUTPATIENT
Start: 2023-12-20 | End: 2024-09-24

## 2023-12-20 NOTE — LETTER
12/20/2023       RE: Mireya Yoon  [address]  [place] [place] [address]       Dear Colleague,    Thank you for referring your patient, Mireya Yoon, to the [facility] PHYSICAL MEDICINE AND REHABILITATION CLINIC [place] at [facility]. Please see a copy of my visit note below.    Mireya is a 23 year old who is being evaluated via a billable video visit.      Joined the call at 12/20/2023, 9:08:28 am.  Left the call at 12/20/2023, 9:26:52 am.  You were on the call for 18 minutes 24 seconds .      PM&R Follow-Up Visit -     Date of Initial Visit: 12/19/23  LOV: 12/19/23  TD: 12/20/2023     Recall: Mireya Yoon is a 23 year old female who presents with a chief complaint of low back and SI joint pain.      INTERVAL HISTORY:  Patient was last seen in clinic 12/19/2023. At that visit, the plan was to obtain x-rays of the lumbar spine and pelvis and hips, begin physical therapy and a home exercise program, trial methocarbamol, and RTC after 6 weeks of PT/HEP. She had a follow-up today via telehealth for review of the x-ray imaging and to discuss next steps.      RECALL HISTORY OF PRESENT ILLNESS:  Mireya Yoon is a 23 year old female who presents with a chief complaint of low back pain.     She was seen today in the clinic, accompanied by her boyfriend.    She reports that around 4 months ago she began having bilateral low back pain.  At the time, she was very active doing classes at the gym.  She says the classes did not involve any particularly heavy lifting (max 12 pounds).  She cannot recall any particular injury.  She has since stopped the exercise classes.     Initially, she says the pain was quite significant and she was not able to put on her socks by herself.  She began following with a chiropractor doing electrical stimulation type of therapy along with adjustments and that helped.    Overall, she states the pain has improved since the initial onset. However  the pain ebbs and flows from day-to-day and does not ever completely go away.    She describes nonradiating, bilateral low back pain.  In lumbar flexion, she has more pain on the left side.  The pain is also exacerbated with twisting, getting out of bed in the morning, stretching, or sitting on the toilet.  The pain tends to be worse in the morning.  Relieving factors include sitting, laying down, or being still.  Today she rates the pain 4/10.  At its worst over the last week, the pain was 7/10.  Her sleep has not been affected.    Of note, she did physical therapy for left low back/hip discomfort 3 years ago and found it helpful.  She states the quality of the pain this time is somewhat different than it was then.     Additionally, she feels as though her left hip tends to be chronically twisted or in some way oriented differently than the right side.  She finds that most noticeable when walking.    She denies falls, weakness, bowel or bladder incontinence, saddle anesthesia, unintentional weight loss, fevers/chills, or night sweats.       PRIOR INJURIES/TREATMENT:   Ice/Heat: ice - no relief    Brace: none    Physical Therapy:   PT in 2022 for thoracic spine pain.  PT in 2020 for left-sided low back pain     - Current Pain Medications -   Tylenol as needed  Naproxen as needed  Ibuprofen as needed  *Sertraline,   *Sumatriptan, as needed for migraine    - Prior/Trialed Pain Medications -   None      Prior Procedures:  Date    Procedure   Improvement (%)  none              Prior Related Surgery: none     Other (acupuncture, OMT, CMM, TENS, DME, etc.):   Chiropractor occasionally, does stimulation and HVA. Does help    Specialists Seen - (with most recent, available notes and clinic visits reviewed)   1. Sports medicine-2014-left knee   2.  Neurology-headache clinic    IMAGING - reviewed   2 views pelvis/bilateral hip radiograph(s) 12/19/2023   Findings:     AP view of the pelvis and frog-leg views of both hips were  obtained.      No acute osseous abnormality.     No substantial degenerative change of hips. Borderline coxa profunda  bilaterally. Question mild sclerosis of bilateral sacroiliac joints.     Sacrum and innominate bones are partially obscured by overlying bowel  gas/fecal content.     Intrauterine device projects over the pelvis. 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                                                                      Impression:  1. No acute osseous abnormality.  2. No substantial degenerative change of either hip    2 views lumbar spine radiographs 12/19/2023    Findings:     Standing  AP and lateral  views of the lumbar spine were obtained.     5  lumbar type vertebral bodies are assumed for the purpose of this  dictation.     There is no acute osseous abnormality.       Lumbar vertebrae are well aligned. Maintained intervertebral disc  heights. No significant lumbar spine degenerative change. No  significant degenerative change to the sacroiliac joints on these  nondedicated views.     The visualized bowel gas pattern is non-obstructive.     Intrauterine device overlying the pelvis.                                                                      Impression:  1.  No acute osseous abnormality.  2.  No significant lumbar spine degenerative change.  3. No significant sacroiliac joint degenerative change on these  nondedicated views.    Review Of Systems:  I am responding to those symptoms which are directly relevant to the specific indication for my consultation. I recommend that the patient follow up with their primary or referring provider to pursue any other symptoms which may be of concern.       Medical History:  Anxiety     She Past Surgical History:  08/22/2022: NO HISTORY OF SURGERY    Family History  Her family history includes Diabetes in her maternal grandmother; Gallbladder Disease in her mother; Hypertension in her mother; Unknown/Adopted in her father.     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 Performs ADLs/IADLs independently.    She  reports that she has never smoked. She has never used smokeless tobacco. She reports current alcohol use. She reports that she does not use drugs.        Current Medications:   She Current Outpatient Medications:  Ascorbic Acid (VITAMIN C) 100 MG CHEW, , Disp: , Rfl:   cyanocobalamin (VITAMIN B-12) 100 MCG tablet, Take 100 mcg by mouth daily Unknown dosage, Disp: , Rfl:   fluticasone (FLONASE) 50 MCG/ACT nasal spray, SHAKE LIQUID AND USE 1 SPRAY IN EACH NOSTRIL DAILY, Disp: 16 g, Rfl: 0  levonorgestrel (KYLEENA) 19.5 MG IUD, 1 each (19.5 mg) by Intrauterine route once, Disp: , Rfl:   sertraline (ZOLOFT) 50 MG tablet, Take 1 tablet (50 mg) by mouth daily, Disp: 90 tablet, Rfl: 2  SUMAtriptan (IMITREX) 50 MG tablet, Take 1 tablet (50 mg) by mouth at onset of headache for migraine May repeat in 2 hours. Max 4 tablets/24 hours., Disp: 8 tablet, Rfl: 0  Vitamin D, Cholecalciferol, 10 MCG (400 UNIT) TABS, Unknown dosage, Disp: , Rfl:          Allergies:    -- Seasonal Allergies     PHYSICAL EXAMINATION: *limited by nature of virtual visit   No vital signs taken for visit  --CONSTITUTIONAL: No acute distress.   --PSYCHIATRIC: The patient is awake, alert, oriented to person, place, time and answering questions appropriately with clear speech. Appropriate mood and affect         ASSESSMENT:  Mireya Yoon is a pleasant 23 year old female who presents with a 4-month history of atraumatic, localized, nonradiating, bilateral low back pain which has improved over time but has remained persistent and varies day-to-day.  Neurologic exam is reassuring.  There are no red flags.  Suspect the pain may be related to the SI joint.  Facet mediated pain, disc bulge, or intra-articular hip related pain are other possibilities.    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  She has not yet picked that up, but says that she knows that the prescription is ready  -Will also add a Medrol Dosepak.  She reports some mood related effects (feeling angry) and constipation after taking a course of prednisone in the past for a sinus infection, but was nonetheless open to try the steroid pack to help decrease the pain  -I asked her to make an appointment after the MRI is done to go over the imagings and discuss next steps  -RTC for review of the MRI.    Ready to learn, no apparent learning barriers.  Education provided on treatment plan according to patient's preferred learning style.  Patient verbalizes understanding.   _______________________________    34 minutes spent by me on the date of the encounter doing chart review, history and exam, documentation and further activities per the note      Again, thank you for allowing me to participate in the care of your patient.      Sincerely,    SHANTELL Carson CNP

## 2023-12-20 NOTE — PATIENT INSTRUCTIONS
It was a pleasure seeing you in the clinic today.  As discussed, we made the following updates to your plan of care:       An order for labs was added. You can call [phone] (Option 1) to schedule an appointment.      A MRI order was added today.  Radiology will call you to schedule. You may also call [facility] Imaging Scheduling directly if you do not hear from them in the next couple of days.    Imaging scheduling  [facility] [phone] Clinics and Surgery Center [facility] ([facility]), [facility], including [facility], [facility] [phone] [facility], [facility] including [facility], [place], and [facility] [phone] [facility], [facility] Special[facility], [facility] clinics, including [place], [facility], and [facility] [phone] [facility], [facility], [facility], including [place], [facility], and [place]     Continue with plan for physical therapy and the home exercise program    A Medrol Dose Pack (Prednisone Taper) was prescribed today for your acute pain. Please follow the dosing instruction on prescription bottle.   Avoid NSAIDs while taking this medication (such as ibuprofen, aleve, excedrin, naproxen, etc.).    POSSIBLE STEROID SIDE EFFECTS   -If you experience these, it should only last for a short period-   Change in menstrual flow   Swelling   Increased appetite   Skin redness (flushness)   Skin rash   Mouth (oral) irritation   Blood sugar (glucose) levels   Sweats   Mood changes     Methocarbamol was prescribed at the last visit.  This is a muscle relaxer that may be taken for muscle tightness and pain as needed.  This drug can cause sedation / drowsiness, so when taking this medication avoid driving or other tasks that require you  to be alert.     Please schedule follow up with me after MRI. (Please schedule the follow up at least 1 day after the MRI to give radiology time to do the report as well.)     Thank you,  Alicia Galeas NP  Physical Medicine and Rehabilitation, Medical Spine  PM&R clinic Phone: [phone]  PM&R clinic Fax: [phone]

## 2023-12-20 NOTE — PROGRESS NOTES
"Virtual Visit Details    Type of service:  Video Visit     Originating Location (pt. Location): {video visit patient location:753049::\"Home\"}  {PROVIDER LOCATION On-site should be selected for visits conducted from your clinic location or adjoining [facility] hospital, academic office, or other nearby [facility] building. Off-site should be selected for all other provider locations, including home:674529}  Distant Location (provider location):  {virtual location provider:640759}  Platform used for Video Visit: {Virtual Visit Platforms:370716::\"[employer]\"}    "

## 2023-12-20 NOTE — NURSING NOTE
Is the patient currently in the state of [place]? YES    Visit mode:VIDEO    If the visit is dropped, the patient can be reconnected by: TELEPHONE VISIT: Phone number:   Telephone Information:   Mobile [phone]       Will anyone else be joining the visit? NO  (If patient encounters technical issues they should call [phone])    How would you like to obtain your AVS? MyChart    Are changes needed to the allergy or medication list? Pt stated no med changes    Reason for visit: Video Visit (Discuss MRI results )    Inez CINTRON

## 2023-12-20 NOTE — PROGRESS NOTES
Mireya is a 23 year old who is being evaluated via a billable video visit.      Joined the call at 12/20/2023, 9:08:28 am.  Left the call at 12/20/2023, 9:26:52 am.  You were on the call for 18 minutes 24 seconds .      Video-Visit Details    Type of service:  Video Visit     Originating Location (pt. Location): Home  Distant Location (provider location):  Off-site  Platform used for Video Visit: Crittercism      PM&R Follow-Up Visit -     Date of Initial Visit: 12/19/23  LOV: 12/19/23  TD: 12/20/2023     Recall: Mireya Yoon is a 23 year old female who presents with a chief complaint of low back and SI joint pain.      INTERVAL HISTORY:  Patient was last seen in clinic 12/19/2023. At that visit, the plan was to obtain x-rays of the lumbar spine and pelvis and hips, begin physical therapy and a home exercise program, trial methocarbamol, and RTC after 6 weeks of PT/HEP. She had a follow-up today via telehealth for review of the x-ray imaging and to discuss next steps.      RECALL HISTORY OF PRESENT ILLNESS:  Mireya Yoon is a 23 year old female who presents with a chief complaint of low back pain.     She was seen today in the clinic, accompanied by her boyfriend.    She reports that around 4 months ago she began having bilateral low back pain.  At the time, she was very active doing classes at the gym.  She says the classes did not involve any particularly heavy lifting (max 12 pounds).  She cannot recall any particular injury.  She has since stopped the exercise classes.     Initially, she says the pain was quite significant and she was not able to put on her socks by herself.  She began following with a chiropractor doing electrical stimulation type of therapy along with adjustments and that helped.    Overall, she states the pain has improved since the initial onset. However the pain ebbs and flows from day-to-day and does not ever completely go away.    She describes nonradiating, bilateral low back pain.  In lumbar  flexion, she has more pain on the left side.  The pain is also exacerbated with twisting, getting out of bed in the morning, stretching, or sitting on the toilet.  The pain tends to be worse in the morning.  Relieving factors include sitting, laying down, or being still.  Today she rates the pain 4/10.  At its worst over the last week, the pain was 7/10.  Her sleep has not been affected.    Of note, she did physical therapy for left low back/hip discomfort 3 years ago and found it helpful.  She states the quality of the pain this time is somewhat different than it was then.     Additionally, she feels as though her left hip tends to be chronically twisted or in some way oriented differently than the right side.  She finds that most noticeable when walking.    She denies falls, weakness, bowel or bladder incontinence, saddle anesthesia, unintentional weight loss, fevers/chills, or night sweats.       PRIOR INJURIES/TREATMENT:   Ice/Heat: ice - no relief    Brace: none    Physical Therapy:   PT in 2022 for thoracic spine pain.  PT in 2020 for left-sided low back pain     - Current Pain Medications -   Tylenol as needed  Naproxen as needed  Ibuprofen as needed  *Sertraline,   *Sumatriptan, as needed for migraine    - Prior/Trialed Pain Medications -   None      Prior Procedures:  Date    Procedure   Improvement (%)  none              Prior Related Surgery: none     Other (acupuncture, OMT, CMM, TENS, DME, etc.):   Chiropractor occasionally, does stimulation and HVA. Does help    Specialists Seen - (with most recent, available notes and clinic visits reviewed)   1. Sports medicine-2014-left knee   2.  Neurology-headache clinic    IMAGING - reviewed   2 views pelvis/bilateral hip radiograph(s) 12/19/2023   Findings:     AP view of the pelvis and frog-leg views of both hips were obtained.      No acute osseous abnormality.     No substantial degenerative change of hips. Borderline coxa profunda  bilaterally. Question mild  sclerosis of bilateral sacroiliac joints.     Sacrum and innominate bones are partially obscured by overlying bowel  gas/fecal content.     Intrauterine device projects over the pelvis. 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                                                                      Impression:  1. No acute osseous abnormality.  2. No substantial degenerative change of either hip    2 views lumbar spine radiographs 12/19/2023    Findings:     Standing  AP and lateral  views of the lumbar spine were obtained.     5  lumbar type vertebral bodies are assumed for the purpose of this  dictation.     There is no acute osseous abnormality.       Lumbar vertebrae are well aligned. Maintained intervertebral disc  heights. No significant lumbar spine degenerative change. No  significant degenerative change to the sacroiliac joints on these  nondedicated views.     The visualized bowel gas pattern is non-obstructive.     Intrauterine device overlying the pelvis.                                                                      Impression:  1.  No acute osseous abnormality.  2.  No significant lumbar spine degenerative change.  3. No significant sacroiliac joint degenerative change on these  nondedicated views.    Review Of Systems:  I am responding to those symptoms which are directly relevant to the specific indication for my consultation. I recommend that the patient follow up with their primary or referring provider to pursue any other symptoms which may be of concern.       Medical History:  Anxiety     She Past Surgical History:  08/22/2022: NO HISTORY OF SURGERY    Family History  Her family history includes Diabetes in her maternal grandmother; Gallbladder Disease in her mother; Hypertension in her mother; Unknown/Adopted in her father.     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 Performs  ADLs/IADLs independently.    She  reports that she has never smoked. She has never used smokeless tobacco. She reports current alcohol use. She reports that she does not use drugs.        Current Medications:   She Current Outpatient Medications:  Ascorbic Acid (VITAMIN C) 100 MG CHEW, , Disp: , Rfl:   cyanocobalamin (VITAMIN B-12) 100 MCG tablet, Take 100 mcg by mouth daily Unknown dosage, Disp: , Rfl:   fluticasone (FLONASE) 50 MCG/ACT nasal spray, SHAKE LIQUID AND USE 1 SPRAY IN EACH NOSTRIL DAILY, Disp: 16 g, Rfl: 0  levonorgestrel (KYLEENA) 19.5 MG IUD, 1 each (19.5 mg) by Intrauterine route once, Disp: , Rfl:   sertraline (ZOLOFT) 50 MG tablet, Take 1 tablet (50 mg) by mouth daily, Disp: 90 tablet, Rfl: 2  SUMAtriptan (IMITREX) 50 MG tablet, Take 1 tablet (50 mg) by mouth at onset of headache for migraine May repeat in 2 hours. Max 4 tablets/24 hours., Disp: 8 tablet, Rfl: 0  Vitamin D, Cholecalciferol, 10 MCG (400 UNIT) TABS, Unknown dosage, Disp: , Rfl:          Allergies:    -- Seasonal Allergies     PHYSICAL EXAMINATION: *limited by nature of virtual visit   No vital signs taken for visit  --CONSTITUTIONAL: No acute distress.   --PSYCHIATRIC: The patient is awake, alert, oriented to person, place, time and answering questions appropriately with clear speech. Appropriate mood and affect         ASSESSMENT:  Mireya Yoon is a pleasant 23 year old female who presents with a 4-month history of atraumatic, localized, nonradiating, bilateral low back pain which has improved over time but has remained persistent and varies day-to-day.  Neurologic exam is reassuring.  There are no red flags.  Suspect the pain may be related to the SI joint.  Facet mediated pain, disc bulge, or intra-articular hip related pain are other possibilities.    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  She has not yet picked that up, but says that she knows that the prescription is ready  -Will also add a Medrol Dosepak.  She reports some mood related effects (feeling angry) and constipation after taking a course of prednisone in the past for a sinus infection, but was nonetheless open to try the steroid pack to help decrease the pain  -I asked her to make an appointment after the MRI is done to go over the imagings and discuss next steps  -RTC for review of the MRI.    Ready to learn, no apparent learning barriers.  Education provided on treatment plan according to patient's preferred learning style.  Patient verbalizes understanding.   __________________________________  Alicia Galeas NP  Physical Medicine & Rehabilitation      34 minutes spent by me on the date of the encounter doing chart review, history and exam, documentation and further activities per the note

## 2023-12-22 ENCOUNTER — THERAPY VISIT (OUTPATIENT)
Dept: PHYSICAL THERAPY | Facility: CLINIC | Age: 23
End: 2023-12-22
Attending: NURSE PRACTITIONER
Payer: COMMERCIAL

## 2023-12-22 DIAGNOSIS — M54.50 CHRONIC BILATERAL LOW BACK PAIN WITHOUT SCIATICA: ICD-10-CM

## 2023-12-22 DIAGNOSIS — M25.552 HIP PAIN, LEFT: ICD-10-CM

## 2023-12-22 DIAGNOSIS — G89.29 CHRONIC BILATERAL LOW BACK PAIN WITHOUT SCIATICA: ICD-10-CM

## 2023-12-22 DIAGNOSIS — M62.838 MUSCLE SPASM: ICD-10-CM

## 2023-12-22 DIAGNOSIS — M53.3 SACROILIAC JOINT PAIN: ICD-10-CM

## 2023-12-22 PROCEDURE — 97161 PT EVAL LOW COMPLEX 20 MIN: CPT | Mod: GP | Performed by: PHYSICAL THERAPIST

## 2023-12-22 PROCEDURE — 97110 THERAPEUTIC EXERCISES: CPT | Mod: GP | Performed by: PHYSICAL THERAPIST

## 2023-12-22 PROCEDURE — 97112 NEUROMUSCULAR REEDUCATION: CPT | Mod: GP | Performed by: PHYSICAL THERAPIST

## 2023-12-22 NOTE — PROGRESS NOTES
PHYSICAL THERAPY EVALUATION  Type of Visit: Evaluation    See electronic medical record for Abuse and Falls Screening details.    Subjective       Presenting condition or subjective complaint: Lower back, hip pain.  Pt points to pain in central low back, sometimes L > R side more focused pain.  Denies radicular symptoms down leg, no numbness or tingling, no bowel/bladder chcanges.   Reports no mechanism of injury or trauma.  She use to be active working out and has not worked out too much since start of pain in August 2023.  Will walk on treadmill sometimes but can aggravate back pain walking on include.  Pt started dose of prednisone steroid pack yesterday which seems to be helping a little.    Date of onset: 08/01/23    Relevant medical history: Mental Illness; Migraines or headaches   Dates & types of surgery:  NA    Prior diagnostic imaging/testing results: X-ray     Prior therapy history for the same diagnosis, illness or injury: Yes 2020, 2022 - has gotten chiropractic care with [facility].      Prior Level of Function  Transfers:   Ambulation:   ADL:   IADL:     Living Environment  Social support: With a significant other or spouse   Type of home: Apartment/condo   Stairs to enter the home: Yes 15 Is there a railing: Yes   Ramp: No   Stairs inside the home: No       Help at home: None  Equipment owned:       Employment: Yes Mental health practioner  Hobbies/Interests: Reading, exercising, nature    Patient goals for therapy: Sit, move, and dress without pain. Go back to the gym.    Pain assessment:      Objective   LUMBAR SPINE EVALUATION  PAIN: Pain Level at Rest: 0/10  Pain Level with Use: 4/10  Pain Location: lumbar spine and central low back, sometimes in L buttock  Pain Quality: sharp with bending down, dressing etc, aching pain with sitting postures  Pain Frequency: intermittent  Pain is Worst: AM, gets better as the day goes on but never completely gone  Pain is Exacerbated By: bending, twisting, sitting  worse,   Pain is Relieved By: currently on steroid dose pack, rest, lying down and not moving, ice  Pain Progression: depends on the day  INTEGUMENTARY (edema, incisions): WNL  POSTURE: Standing Posture: Lordosis increased  GAIT:   Weightbearing Status:   Assistive Device(s):   Gait Deviations:   BALANCE/PROPRIOCEPTION:   WEIGHTBEARING ALIGNMENT:   NON-WEIGHTBEARING ALIGNMENT:    ROM:  lumbar flex able to reach hands to knees with pain, no lumbar curve reversal noted, extension mod loss, sideglide WNL .  RFIS increased pain, MICHELLE increased pain.  No change with either.  RFIL increased pain, no change, REIL pain, no worse, did have increased ROM.   PELVIC/SI SCREEN: WNL, no tenderness to palpation at SIJ or PSIS  STRENGTH: WNL    MYOTOMES: WNL  DTR S: WNL  CORD SIGNS: WNL  DERMATOMES: WNL  NEURAL TENSION: Lumbar WNL  FLEXIBILITY: WNL  LUMBAR/HIP Special Tests:    PELVIS/SI SPECIAL TESTS:   FUNCTIONAL TESTS: Double Leg Squat: Anterior knee translation, Knee valgus, and Improper use of glutes/hips  PALPATION:  no tenderness to palpation at lumbar paraspinals, PSIS, lumbar spinous process  SPINAL SEGMENTAL CONCLUSIONS:       Assessment & Plan   CLINICAL IMPRESSIONS  Medical Diagnosis: LBP    Treatment Diagnosis: chronic LBP   Impression/Assessment: Patient is a 23 year old female with chronic central LBP complaints.  The following significant findings have been identified: Pain, Decreased ROM/flexibility, Impaired muscle performance, Decreased activity tolerance, and Impaired posture. These impairments interfere with their ability to perform self care tasks, recreational activities, and community mobility as compared to previous level of function.     Clinical Decision Making (Complexity):  Clinical Presentation: Stable/Uncomplicated  Clinical Presentation Rationale: based on medical and personal factors listed in PT evaluation  Clinical Decision Making (Complexity): Low complexity    PLAN OF CARE  Treatment  Interventions:  Interventions: Manual Therapy, Neuromuscular Re-education, Therapeutic Activity, Therapeutic Exercise, Self-Care/Home Management    Long Term Goals     PT Goal 1  Goal Identifier: bending  Goal Description: Pt will be able to reach ankles 0/10 pain  Rationale: to maximize safety and independence with self cares;to maximize safety and independence with performance of ADLs and functional tasks  Goal Progress: can only bend to knees with 5/10 pain  Target Date: 02/20/24      Frequency of Treatment: 1 x a week  Duration of Treatment: 3 weeks tapering to 2 times a month x 2 months    Recommended Referrals to Other Professionals: Physical Therapy  Education Assessment:        Risks and benefits of evaluation/treatment have been explained.   Patient/Family/caregiver agrees with Plan of Care.     Evaluation Time:     PT Eval, Low Complexity Minutes (41261): 15       Signing Clinician: Edilson Castillo PT

## 2024-01-05 ENCOUNTER — THERAPY VISIT (OUTPATIENT)
Dept: PHYSICAL THERAPY | Facility: CLINIC | Age: 24
End: 2024-01-05
Payer: COMMERCIAL

## 2024-01-05 ENCOUNTER — ANCILLARY PROCEDURE (OUTPATIENT)
Dept: MRI IMAGING | Facility: CLINIC | Age: 24
End: 2024-01-05
Attending: NURSE PRACTITIONER
Payer: COMMERCIAL

## 2024-01-05 ENCOUNTER — LAB (OUTPATIENT)
Dept: LAB | Facility: CLINIC | Age: 24
End: 2024-01-05
Payer: COMMERCIAL

## 2024-01-05 DIAGNOSIS — G89.29 CHRONIC BILATERAL LOW BACK PAIN WITHOUT SCIATICA: ICD-10-CM

## 2024-01-05 DIAGNOSIS — M54.50 CHRONIC BILATERAL LOW BACK PAIN WITHOUT SCIATICA: ICD-10-CM

## 2024-01-05 DIAGNOSIS — G89.29 CHRONIC BILATERAL LOW BACK PAIN WITHOUT SCIATICA: Primary | ICD-10-CM

## 2024-01-05 DIAGNOSIS — M53.3 SCLEROSIS OF SACROILIAC JOINT: ICD-10-CM

## 2024-01-05 DIAGNOSIS — M53.3 SACROILIAC JOINT PAIN: ICD-10-CM

## 2024-01-05 DIAGNOSIS — M62.838 MUSCLE SPASM: ICD-10-CM

## 2024-01-05 DIAGNOSIS — M54.50 CHRONIC BILATERAL LOW BACK PAIN WITHOUT SCIATICA: Primary | ICD-10-CM

## 2024-01-05 LAB — ERYTHROCYTE [SEDIMENTATION RATE] IN BLOOD BY WESTERGREN METHOD: 7 MM/HR (ref 0–20)

## 2024-01-05 PROCEDURE — 72195 MRI PELVIS W/O DYE: CPT

## 2024-01-05 PROCEDURE — 81374 HLA I TYPING 1 ANTIGEN LR: CPT

## 2024-01-05 PROCEDURE — 97112 NEUROMUSCULAR REEDUCATION: CPT | Mod: GP | Performed by: PHYSICAL THERAPIST

## 2024-01-05 PROCEDURE — 85652 RBC SED RATE AUTOMATED: CPT

## 2024-01-05 PROCEDURE — 86140 C-REACTIVE PROTEIN: CPT

## 2024-01-05 PROCEDURE — 36415 COLL VENOUS BLD VENIPUNCTURE: CPT

## 2024-01-05 PROCEDURE — 97110 THERAPEUTIC EXERCISES: CPT | Mod: GP | Performed by: PHYSICAL THERAPIST

## 2024-01-05 PROCEDURE — 86431 RHEUMATOID FACTOR QUANT: CPT

## 2024-01-06 LAB
CRP SERPL-MCNC: <3 MG/L
RHEUMATOID FACT SERPL-ACNC: <10 IU/ML

## 2024-01-08 ENCOUNTER — VIRTUAL VISIT (OUTPATIENT)
Dept: PHYSICAL MEDICINE AND REHAB | Facility: CLINIC | Age: 24
End: 2024-01-08
Payer: COMMERCIAL

## 2024-01-08 VITALS — BODY MASS INDEX: 30.29 KG/M2 | HEIGHT: 67 IN | WEIGHT: 193 LBS

## 2024-01-08 DIAGNOSIS — M54.50 CHRONIC BILATERAL LOW BACK PAIN WITHOUT SCIATICA: ICD-10-CM

## 2024-01-08 DIAGNOSIS — G89.29 CHRONIC BILATERAL LOW BACK PAIN WITHOUT SCIATICA: ICD-10-CM

## 2024-01-08 DIAGNOSIS — M53.3 SCLEROSIS OF SACROILIAC JOINT: ICD-10-CM

## 2024-01-08 DIAGNOSIS — M53.3 SACROILIAC JOINT PAIN: Primary | ICD-10-CM

## 2024-01-08 DIAGNOSIS — M62.838 MUSCLE SPASM: ICD-10-CM

## 2024-01-08 DIAGNOSIS — M25.552 HIP PAIN, LEFT: ICD-10-CM

## 2024-01-08 PROCEDURE — 99214 OFFICE O/P EST MOD 30 MIN: CPT | Mod: 95 | Performed by: NURSE PRACTITIONER

## 2024-01-08 ASSESSMENT — PAIN SCALES - GENERAL: PAINLEVEL: MILD PAIN (2)

## 2024-01-08 NOTE — PROGRESS NOTES
"Mireya is a 23 year old who is being evaluated via a billable video visit.        Joined the call at 1/8/2024, 9:06:11 am.  Left the call at 1/8/2024, 9:17:49 am.  You were on the call for 11 minutes 37 seconds .      Video-Visit Details    Type of service:  Video Visit     Originating Location (pt. Location): Home  Distant Location (provider location):  On-site  Platform used for Video Visit: [employer]      PM&R Follow-Up Visit -     Date of Initial Visit: 12/19/23  LOV: 12/20/23  TD: 1/8/2024     Recall: Mireya Yoon is a 23 year old female who presents with a chief complaint of low back and SI joint pain.         INTERVAL HISTORY:  Patient was last seen via virtual visit 12/20/23. At that visit, the plan was to obtain MRI of the sacrum/coccyx and labs including CRP, ESR, HLA-B27, RF, continue PT/HEP, & trial medrol dosepack and methocarbamol.     She was seen today for follow up. She says she has been working with PT on core muscle strengthening along with a stretching program and says it has been helping her pain level significantly. She says she had some stiffness getting up this morning but not pain per se. She says 3 days ago she had some mild pain in same distribution as prior but says the pain level has overall been much better controlled.     Since last time she also completed the medrol dose pack and says it helped \"tremendously\". She tried methocarbamol 1 time and also felt it was helpful. Since then, she says the pain level hasn't been high enough to warrant taking pain medication.         RECALL HISTORY OF PRESENT ILLNESS:  Mireya Yoon is a 23 year old female who presents with a chief complaint of low back pain.     She was seen today in the clinic, accompanied by her boyfriend.    She reports that around 4 months ago she began having bilateral low back pain.  At the time, she was very active doing classes at the gym.  She says the classes did not involve any particularly heavy lifting (max 12 pounds).  She " cannot recall any particular injury.  She has since stopped the exercise classes.     Initially, she says the pain was quite significant and she was not able to put on her socks by herself.  She began following with a chiropractor doing electrical stimulation type of therapy along with adjustments and that helped.    Overall, she states the pain has improved since the initial onset. However the pain ebbs and flows from day-to-day and does not ever completely go away.    She describes nonradiating, bilateral low back pain.  In lumbar flexion, she has more pain on the left side.  The pain is also exacerbated with twisting, getting out of bed in the morning, stretching, or sitting on the toilet.  The pain tends to be worse in the morning.  Relieving factors include sitting, laying down, or being still.  Today she rates the pain 4/10.  At its worst over the last week, the pain was 7/10.  Her sleep has not been affected.    Of note, she did physical therapy for left low back/hip discomfort 3 years ago and found it helpful.  She states the quality of the pain this time is somewhat different than it was then.     Additionally, she feels as though her left hip tends to be chronically twisted or in some way oriented differently than the right side.  She finds that most noticeable when walking.    She denies falls, weakness, bowel or bladder incontinence, saddle anesthesia, unintentional weight loss, fevers/chills, or night sweats.     12/20/2023:  Patient was last seen in clinic 12/19/2023. At that visit, the plan was to obtain x-rays of the lumbar spine and pelvis and hips, begin physical therapy and a home exercise program, trial methocarbamol, and RTC after 6 weeks of PT/HEP. She had a follow-up today via telehealth for review of the x-ray imaging and to discuss next steps.          PRIOR INJURIES/TREATMENT:   Ice/Heat: ice     Brace: none    Physical Therapy:   PT in 2022 for thoracic spine pain.  PT in 2020 for  left-sided low back pain     - Current Pain Medications -   methocarbamol  Tylenol as needed  Naproxen as needed  Ibuprofen as needed  *Sertraline,   *Sumatriptan, as needed for migraine    - Prior/Trialed Pain Medications -   Medrol dose pack, Rx 12/20/23      Prior Procedures:  Date    Procedure   Improvement (%)  none              Prior Related Surgery: none     Other (acupuncture, OMT, CMM, TENS, DME, etc.):   Chiropractor occasionally, does stimulation and HVA. Does help    Specialists Seen - (with most recent, available notes and clinic visits reviewed)   1. Sports medicine-2014-left knee   2.  Neurology-headache clinic    IMAGING - reviewed   MR SACRUM AND COCCYX W/O CONTRAST  1/5/2024   FINDINGS:      SI JOINTS AND BONES:  -No effusion, synovitis, or erosive change. No fracture or bone marrow edema.  -There is a small subchondral cyst in the anterior left acetabulum.     SOFT TISSUES:  -No soft tissue mass.  -There is a 2.3 x 2.9 cm right adnexal cyst.  -Intrauterine device.     MUSCLES:  -Muscle bulk is normal.                                                   IMPRESSION:  1.  No evidence of active sacroiliitis. No acute fracture or bone marrow edema.  2.  Small subchondral cyst in the anterior left acetabulum.  3.  There is a 2.3 x 2.9 cm right adnexal cyst.        2 views pelvis/bilateral hip radiograph(s) 12/19/2023   Findings:     AP view of the pelvis and frog-leg views of both hips were obtained.      No acute osseous abnormality.     No substantial degenerative change of hips. Borderline coxa profunda  bilaterally. Question mild sclerosis of bilateral sacroiliac joints.     Sacrum and innominate bones are partially obscured by overlying bowel  gas/fecal content.     Intrauterine device projects over the pelvis. 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                                                      Impression:  1. No acute osseous abnormality.  2. No substantial degenerative change of either  hip        2 views lumbar spine radiographs 12/19/2023    Findings:     Standing  AP and lateral  views of the lumbar spine were obtained.     5  lumbar type vertebral bodies are assumed for the purpose of this  dictation.     There is no acute osseous abnormality.       Lumbar vertebrae are well aligned. Maintained intervertebral disc  heights. No significant lumbar spine degenerative change. No  significant degenerative change to the sacroiliac joints on these  nondedicated views.     The visualized bowel gas pattern is non-obstructive.     Intrauterine device overlying the pelvis.                                                                      Impression:  1.  No acute osseous abnormality.  2.  No significant lumbar spine degenerative change.  3. No significant sacroiliac joint degenerative change on these  nondedicated views.        Review Of Systems:  I am responding to those symptoms which are directly relevant to the specific indication for my consultation. I recommend that the patient follow up with their primary or referring provider to pursue any other symptoms which may be of concern.       Medical History:  Anxiety     She Past Surgical History:  08/22/2022: NO HISTORY OF SURGERY    Family History  Her family history includes Diabetes in her maternal grandmother; Gallbladder Disease in her mother; Hypertension in her mother; Unknown/Adopted in her father.     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 Performs ADLs/IADLs independently.    She  reports that she has never smoked. She has never used smokeless tobacco. She reports current alcohol use. She reports that she does not use drugs.        Current Medications:   She Current Outpatient Medications:  Ascorbic Acid (VITAMIN C) 100 MG CHEW, , Disp: , Rfl:   cyanocobalamin (VITAMIN B-12) 100 MCG tablet, Take 100 mcg by mouth daily Unknown  dosage, Disp: , Rfl:   fluticasone (FLONASE) 50 MCG/ACT nasal spray, SHAKE LIQUID AND USE 1 SPRAY IN EACH NOSTRIL DAILY, Disp: 16 g, Rfl: 0  levonorgestrel (KYLEENA) 19.5 MG IUD, 1 each (19.5 mg) by Intrauterine route once, Disp: , Rfl:   sertraline (ZOLOFT) 50 MG tablet, Take 1 tablet (50 mg) by mouth daily, Disp: 90 tablet, Rfl: 2  SUMAtriptan (IMITREX) 50 MG tablet, Take 1 tablet (50 mg) by mouth at onset of headache for migraine May repeat in 2 hours. Max 4 tablets/24 hours., Disp: 8 tablet, Rfl: 0  Vitamin D, Cholecalciferol, 10 MCG (400 UNIT) TABS, Unknown dosage, Disp: , Rfl:          Allergies:    -- Seasonal Allergies     PHYSICAL EXAMINATION: *limited by nature of virtual visit   No vital signs taken for visit  --CONSTITUTIONAL: No acute distress.   --PSYCHIATRIC: The patient is awake, alert, oriented to person, place, time and answering questions appropriately with clear speech. Appropriate mood and affect         ASSESSMENT:  Mireya Yoon is a pleasant 23 year old female who presents with a 4-month history of atraumatic, localized, nonradiating, bilateral low back pain which has improved over time but has remained persistent and varies day-to-day.  Neurologic exam is reassuring.  There are no red flags.  Suspect the pain may be related to the SI joint.  Facet mediated pain, disc bulge, or intra-articular hip related pain are other possibilities.    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 I also let her know that I have discussed the findings along with the reading radiologist.  According to the conversation with the radiologist, there was a possible tiny degree of sclerosis involving the inferior aspects of  both SI joints, R>L, but not a striking amount - possibly related to a small episode of sacroiliitis in the past?  -Laboratory findings 1/5/23: RF, ESR, and CRP were all normal.  HLA-B27 is still in process.  -Continue PT with HEP, which has been very helpful for the pain level  -She completed a Medrol Dosepak which was helpful.  -Continue as needed methocarbamol  -Could consider diagnostic/therapeutic SI joint steroid injection  -We discussed possible referral for consultation with rheumatology given the SI joint sclerosis seen on MR. She prefers to hold off for now  -Referral for orthopedics assessment regarding small subchondral cyst in the anterior left acetabulum could also be considered  -She plans to follow up with her PCP regarding right adnexal cyst.    -RTC she plans to schedule follow up in early to mid February after completing additional PT/HEP. Consider adding MRI lumbar spine at that time if she is having persistent pain        Ready to learn, no apparent learning barriers.  Education provided on treatment plan according to patient's preferred learning style.  Patient verbalizes understanding.   __________________________________  Alicia Galeas NP  Physical Medicine & Rehabilitation      38 minutes spent by me on the date of the encounter doing chart review, history and exam, documentation and further activities per the note

## 2024-01-08 NOTE — LETTER
"1/8/2024       RE: Mireya oYon  [address]  [facility] [address]     Dear Colleague,    Thank you for referring your patient, Mireya Yoon, to the [facility] [place] at [facility]. Please see a copy of my visit note below.    Mireya is a 23 year old who is being evaluated via a billable video visit.        Joined the call at 1/8/2024, 9:06:11 am.  Left the call at 1/8/2024, 9:17:49 am.  You were on the call for 11 minutes 37 seconds .      Video-Visit Details    Type of service:  Video Visit     Originating Location (pt. Location): Home  Distant Location (provider location):  On-site  Platform used for Video Visit: [employer]      PM&R Follow-Up Visit -     Date of Initial Visit: 12/19/23  LOV: 12/20/23  TD: 1/8/2024     Recall: Mireya Yoon is a 23 year old female who presents with a chief complaint of low back and SI joint pain.         INTERVAL HISTORY:  Patient was last seen via virtual visit 12/20/23. At that visit, the plan was to obtain MRI of the sacrum/coccyx and labs including CRP, ESR, HLA-B27, RF, continue PT/HEP, & trial medrol dosepack and methocarbamol.     She was seen today for follow up. She says she has been working with PT on core muscle strengthening along with a stretching program and says it has been helping her pain level significantly. She says she had some stiffness getting up this morning but not pain per se. She says 3 days ago she had some mild pain in same distribution as prior but says the pain level has overall been much better controlled.     Since last time she also completed the medrol dose pack and says it helped \"tremendously\". She tried methocarbamol 1 time and also felt it was helpful. Since then, she says the pain level hasn't been high enough to warrant taking pain medication.         RECALL HISTORY OF PRESENT ILLNESS:  Mireya Yoon is a 23 year old female who presents with a " chief complaint of low back pain.     She was seen today in the clinic, accompanied by her boyfriend.    She reports that around 4 months ago she began having bilateral low back pain.  At the time, she was very active doing classes at the gym.  She says the classes did not involve any particularly heavy lifting (max 12 pounds).  She cannot recall any particular injury.  She has since stopped the exercise classes.     Initially, she says the pain was quite significant and she was not able to put on her socks by herself.  She began following with a chiropractor doing electrical stimulation type of therapy along with adjustments and that helped.    Overall, she states the pain has improved since the initial onset. However the pain ebbs and flows from day-to-day and does not ever completely go away.    She describes nonradiating, bilateral low back pain.  In lumbar flexion, she has more pain on the left side.  The pain is also exacerbated with twisting, getting out of bed in the morning, stretching, or sitting on the toilet.  The pain tends to be worse in the morning.  Relieving factors include sitting, laying down, or being still.  Today she rates the pain 4/10.  At its worst over the last week, the pain was 7/10.  Her sleep has not been affected.    Of note, she did physical therapy for left low back/hip discomfort 3 years ago and found it helpful.  She states the quality of the pain this time is somewhat different than it was then.     Additionally, she feels as though her left hip tends to be chronically twisted or in some way oriented differently than the right side.  She finds that most noticeable when walking.    She denies falls, weakness, bowel or bladder incontinence, saddle anesthesia, unintentional weight loss, fevers/chills, or night sweats.     12/20/2023:  Patient was last seen in clinic 12/19/2023. At that visit, the plan was to obtain x-rays of the lumbar spine and pelvis and hips, begin physical  therapy and a home exercise program, trial methocarbamol, and RTC after 6 weeks of PT/HEP. She had a follow-up today via telehealth for review of the x-ray imaging and to discuss next steps.          PRIOR INJURIES/TREATMENT:   Ice/Heat: ice     Brace: none    Physical Therapy:   PT in 2022 for thoracic spine pain.  PT in 2020 for left-sided low back pain     - Current Pain Medications -   methocarbamol  Tylenol as needed  Naproxen as needed  Ibuprofen as needed  *Sertraline,   *Sumatriptan, as needed for migraine    - Prior/Trialed Pain Medications -   Medrol dose pack, Rx 12/20/23      Prior Procedures:  Date    Procedure   Improvement (%)  none              Prior Related Surgery: none     Other (acupuncture, OMT, CMM, TENS, DME, etc.):   Chiropractor occasionally, does stimulation and HVA. Does help    Specialists Seen - (with most recent, available notes and clinic visits reviewed)   1. Sports medicine-2014-left knee   2.  Neurology-headache clinic    IMAGING - reviewed   MR SACRUM AND COCCYX W/O CONTRAST  1/5/2024   FINDINGS:      SI JOINTS AND BONES:  -No effusion, synovitis, or erosive change. No fracture or bone marrow edema.  -There is a small subchondral cyst in the anterior left acetabulum.     SOFT TISSUES:  -No soft tissue mass.  -There is a 2.3 x 2.9 cm right adnexal cyst.  -Intrauterine device.     MUSCLES:  -Muscle bulk is normal.                                                   IMPRESSION:  1.  No evidence of active sacroiliitis. No acute fracture or bone marrow edema.  2.  Small subchondral cyst in the anterior left acetabulum.  3.  There is a 2.3 x 2.9 cm right adnexal cyst.        2 views pelvis/bilateral hip radiograph(s) 12/19/2023   Findings:     AP view of the pelvis and frog-leg views of both hips were obtained.      No acute osseous abnormality.     No substantial degenerative change of hips. Borderline coxa profunda  bilaterally. Question mild sclerosis of bilateral sacroiliac joints.      Sacrum and innominate bones are partially obscured by overlying bowel  gas/fecal content.     Intrauterine device projects over the pelvis. 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                                                      Impression:  1. No acute osseous abnormality.  2. No substantial degenerative change of either hip        2 views lumbar spine radiographs 12/19/2023    Findings:     Standing  AP and lateral  views of the lumbar spine were obtained.     5  lumbar type vertebral bodies are assumed for the purpose of this  dictation.     There is no acute osseous abnormality.       Lumbar vertebrae are well aligned. Maintained intervertebral disc  heights. No significant lumbar spine degenerative change. No  significant degenerative change to the sacroiliac joints on these  nondedicated views.     The visualized bowel gas pattern is non-obstructive.     Intrauterine device overlying the pelvis.                                                                      Impression:  1.  No acute osseous abnormality.  2.  No significant lumbar spine degenerative change.  3. No significant sacroiliac joint degenerative change on these  nondedicated views.        Review Of Systems:  I am responding to those symptoms which are directly relevant to the specific indication for my consultation. I recommend that the patient follow up with their primary or referring provider to pursue any other symptoms which may be of concern.       Medical History:  Anxiety     She Past Surgical History:  08/22/2022: NO HISTORY OF SURGERY    Family History  Her family history includes Diabetes in her maternal grandmother; Gallbladder Disease in her mother; Hypertension in her mother; Unknown/Adopted in her father.     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 Performs ADLs/IADLs independently.    She  reports that she has never  smoked. She has never used smokeless tobacco. She reports current alcohol use. She reports that she does not use drugs.        Current Medications:   She Current Outpatient Medications:  Ascorbic Acid (VITAMIN C) 100 MG CHEW, , Disp: , Rfl:   cyanocobalamin (VITAMIN B-12) 100 MCG tablet, Take 100 mcg by mouth daily Unknown dosage, Disp: , Rfl:   fluticasone (FLONASE) 50 MCG/ACT nasal spray, SHAKE LIQUID AND USE 1 SPRAY IN EACH NOSTRIL DAILY, Disp: 16 g, Rfl: 0  levonorgestrel (KYLEENA) 19.5 MG IUD, 1 each (19.5 mg) by Intrauterine route once, Disp: , Rfl:   sertraline (ZOLOFT) 50 MG tablet, Take 1 tablet (50 mg) by mouth daily, Disp: 90 tablet, Rfl: 2  SUMAtriptan (IMITREX) 50 MG tablet, Take 1 tablet (50 mg) by mouth at onset of headache for migraine May repeat in 2 hours. Max 4 tablets/24 hours., Disp: 8 tablet, Rfl: 0  Vitamin D, Cholecalciferol, 10 MCG (400 UNIT) TABS, Unknown dosage, Disp: , Rfl:          Allergies:    -- Seasonal Allergies     PHYSICAL EXAMINATION: *limited by nature of virtual visit   No vital signs taken for visit  --CONSTITUTIONAL: No acute distress.   --PSYCHIATRIC: The patient is awake, alert, oriented to person, place, time and answering questions appropriately with clear speech. Appropriate mood and affect         ASSESSMENT:  Mireya Yoon is a pleasant 23 year old female who presents with a 4-month history of atraumatic, localized, nonradiating, bilateral low back pain which has improved over time but has remained persistent and varies day-to-day.  Neurologic exam is reassuring.  There are no red flags.  Suspect the pain may be related to the SI joint.  Facet mediated pain, disc bulge, or intra-articular hip related pain are other possibilities.    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 I also let her know that I have discussed the findings along with the reading radiologist.  According to the conversation with the radiologist, there was a possible tiny degree of sclerosis involving the inferior aspects of both SI joints, R>L, but not a striking amount - possibly related to a small episode of sacroiliitis in the past?  -Laboratory findings 1/5/23: RF, ESR, and CRP were all normal.  HLA-B27 is still in process.  -Continue PT with HEP, which has been very helpful for the pain level  -She completed a Medrol Dosepak which was helpful.  -Continue as needed methocarbamol  -Could consider diagnostic/therapeutic SI joint steroid injection  -We discussed possible referral for consultation with rheumatology given the SI joint sclerosis seen on MR. She prefers to hold off for now  -Referral for orthopedics assessment regarding small subchondral cyst in the anterior left acetabulum could also be considered  -She plans to follow up with her PCP regarding right adnexal cyst.    -RTC she plans to schedule follow up in early to mid February after completing additional PT/HEP. Consider adding MRI lumbar spine at that time if she is having persistent pain      Ready to learn, no apparent learning barriers.  Education provided on treatment plan according to patient's preferred learning style.  Patient verbalizes understanding.   __________________________________          38 minutes spent by me on the date of the encounter doing chart review, history and exam, documentation and further activities per the note            Again, thank you for allowing me to participate in the care of your patient.      Sincerely,    SHANTELL Carson CNP

## 2024-01-08 NOTE — NURSING NOTE
Is the patient currently in the state of [place]? YES    Visit mode:VIDEO    If the visit is dropped, the patient can be reconnected by: VIDEO VISIT: Text to cell phone:   Telephone Information:   Mobile [phone]       Will anyone else be joining the visit? NO  (If patient encounters technical issues they should call [phone]    How would you like to obtain your AVS? MyChart    Are changes needed to the allergy or medication list? No    Reason for visit: JIE MEJIAF

## 2024-01-08 NOTE — PROGRESS NOTES
"Virtual Visit Details    Type of service:  Video Visit     Originating Location (pt. Location): {video visit patient location:291611::\"Home\"}  {PROVIDER LOCATION On-site should be selected for visits conducted from your clinic location or adjoining [facility] hospital, academic office, or other nearby [facility] building. Off-site should be selected for all other provider locations, including home:[id number]}  Distant Location (provider location):  {virtual location provider:414889}  Platform used for Video Visit: {Virtual Visit Platforms:866758::\"[employer]\"}  "

## 2024-01-10 LAB
B LOCUS: NORMAL
B27TEST METHOD: NORMAL

## 2024-01-16 DIAGNOSIS — J01.90 ACUTE SINUSITIS, RECURRENCE NOT SPECIFIED, UNSPECIFIED LOCATION: ICD-10-CM

## 2024-01-16 RX ORDER — FLUTICASONE PROPIONATE 50 MCG
1 SPRAY, SUSPENSION (ML) NASAL DAILY
Qty: 16 G | Refills: 0 | Status: SHIPPED | OUTPATIENT
Start: 2024-01-16 | End: 2024-03-14

## 2024-01-19 ENCOUNTER — THERAPY VISIT (OUTPATIENT)
Dept: PHYSICAL THERAPY | Facility: CLINIC | Age: 24
End: 2024-01-19
Payer: COMMERCIAL

## 2024-01-19 DIAGNOSIS — G89.29 CHRONIC BILATERAL LOW BACK PAIN WITHOUT SCIATICA: Primary | ICD-10-CM

## 2024-01-19 DIAGNOSIS — M54.50 CHRONIC BILATERAL LOW BACK PAIN WITHOUT SCIATICA: Primary | ICD-10-CM

## 2024-01-19 PROCEDURE — 97110 THERAPEUTIC EXERCISES: CPT | Mod: GP | Performed by: PHYSICAL THERAPIST

## 2024-01-19 PROCEDURE — 97140 MANUAL THERAPY 1/> REGIONS: CPT | Mod: GP | Performed by: PHYSICAL THERAPIST

## 2024-02-02 ENCOUNTER — THERAPY VISIT (OUTPATIENT)
Dept: PHYSICAL THERAPY | Facility: CLINIC | Age: 24
End: 2024-02-02
Payer: COMMERCIAL

## 2024-02-02 DIAGNOSIS — M54.50 CHRONIC BILATERAL LOW BACK PAIN WITHOUT SCIATICA: Primary | ICD-10-CM

## 2024-02-02 DIAGNOSIS — G89.29 CHRONIC BILATERAL LOW BACK PAIN WITHOUT SCIATICA: Primary | ICD-10-CM

## 2024-02-02 PROCEDURE — 97110 THERAPEUTIC EXERCISES: CPT | Mod: GP | Performed by: PHYSICAL THERAPIST

## 2024-02-13 NOTE — PROGRESS NOTES
Mireya is a 23 year old who is being evaluated via a billable video visit.        Video-Visit Details    Type of service:  Video Visit     Originating Location (pt. Location): Home  Distant Location (provider location):  Off-site  Platform used for Video Visit: Delvin  Joined the call at 2/14/2024, 9:03:26 am.  Left the call at 2/14/2024, 9:22:08 am.  You were on the call for 18 minutes 42 seconds .    PM&R Follow-Up Visit -     Date of Initial Visit: 12/19/23  LOV: 1/8/2024-virtual visit  TD: 2/14/2024     Recall: Mireya Yoon is a 23 year old female who presents with a chief complaint of low back and SI joint pain.          INTERVAL HISTORY:  Patient was last seen via virtual visit 1/8/2024. At that visit, the plan was to await the HLA-B27 result, continue PT with a home exercise program, continue as needed methocarbamol, and consider rheumatology referral.    She was seen today for follow-up via virtual visit.  She has been continuing with physical therapy and home exercise program.  She says that her low back pain has been persistent and fluctuates day-to-day.  She says that today the pain is somewhere in the middle-she has been better, but has also been worse.  She says the persistent pain does affect her mood.    She reports that the physical therapy has been going well and they are focusing on core strengthening and also incorporating some more stretching which she finds helpful.  She does note that during one PT session, the therapist had her doing some side planks on her knees, extending one leg.  She notes that during that exercise she did have 1 transient episode of pain shooting down her leg.  However she has not had any other occurrences of pain radiating to her legs apart from that single incident.    Additionally, she started swimming on her own at the gym as well.  She says that on Saturday she tried some leg exercises using one of the machines at the gym and did have some increased low back pain after  that.    She tried the methocarbamol, but says that it did not seem to be particularly helpful.  When she has a more severe level of pain, she takes naproxen which helps.  She says that she has both Biofreeze and IcyHot but has not tried those for low back pain.      RECALL HISTORY OF PRESENT ILLNESS:  Mireya Yoon is a 23 year old female who presents with a chief complaint of low back pain.     She was seen today in the clinic, accompanied by her boyfriend.    She reports that around 4 months ago she began having bilateral low back pain.  At the time, she was very active doing classes at the gym.  She says the classes did not involve any particularly heavy lifting (max 12 pounds).  She cannot recall any particular injury.  She has since stopped the exercise classes.     Initially, she says the pain was quite significant and she was not able to put on her socks by herself.  She began following with a chiropractor doing electrical stimulation type of therapy along with adjustments and that helped.    Overall, she states the pain has improved since the initial onset. However the pain ebbs and flows from day-to-day and does not ever completely go away.    She describes nonradiating, bilateral low back pain.  In lumbar flexion, she has more pain on the left side.  The pain is also exacerbated with twisting, getting out of bed in the morning, stretching, or sitting on the toilet.  The pain tends to be worse in the morning.  Relieving factors include sitting, laying down, or being still.  Today she rates the pain 4/10.  At its worst over the last week, the pain was 7/10.  Her sleep has not been affected.    Of note, she did physical therapy for left low back/hip discomfort 3 years ago and found it helpful.  She states the quality of the pain this time is somewhat different than it was then.     Additionally, she feels as though her left hip tends to be chronically twisted or in some way oriented differently than the right  "side.  She finds that most noticeable when walking.    She denies falls, weakness, bowel or bladder incontinence, saddle anesthesia, unintentional weight loss, fevers/chills, or night sweats.     12/20/2023:  Patient was last seen in clinic 12/19/2023. At that visit, the plan was to obtain x-rays of the lumbar spine and pelvis and hips, begin physical therapy and a home exercise program, trial methocarbamol, and RTC after 6 weeks of PT/HEP. She had a follow-up today via telehealth for review of the x-ray imaging and to discuss next steps.    1/8/2024:  Patient was last seen via virtual visit 12/20/23. At that visit, the plan was to obtain MRI of the sacrum/coccyx and labs including CRP, ESR, HLA-B27, RF, continue PT/HEP, & trial medrol dosepack and methocarbamol.     She was seen today for follow up. She says she has been working with PT on core muscle strengthening along with a stretching program and says it has been helping her pain level significantly. She says she had some stiffness getting up this morning but not pain per se. She says 3 days ago she had some mild pain in same distribution as prior but says the pain level has overall been much better controlled.     Since last time she also completed the medrol dose pack and says it helped \"tremendously\". She tried methocarbamol 1 time and also felt it was helpful. Since then, she says the pain level hasn't been high enough to warrant taking pain medication.       PRIOR INJURIES/TREATMENT:   Ice/Heat: ice     Brace: none    Physical Therapy:   Currently working with PT  PT in 2022 for thoracic spine pain.  PT in 2020 for left-sided low back pain     - Current Pain Medications -   methocarbamol  Tylenol as needed  Naproxen as needed  Ibuprofen as needed  *Sertraline,   *Sumatriptan, as needed for migraine    - Prior/Trialed Pain Medications -   Medrol dose pack, Rx 12/20/23      Prior Procedures:  Date    Procedure   Improvement (%)  none              Prior " Related Surgery: none     Other (acupuncture, OMT, CMM, TENS, DME, etc.):   Chiropractor occasionally, does stimulation and HVA. Does help    Specialists Seen - (with most recent, available notes and clinic visits reviewed)   1. Sports medicine-2014-left knee   2.  Neurology-headache clinic    LABORATORY  1/5/2024 HLA-B27 negative  Rheumatoid factor, <10 WNL  ESR 7, WNL  CRP, <3.00, WNL    IMAGING - reviewed   MR SACRUM AND COCCYX W/O CONTRAST  1/5/2024   FINDINGS:      SI JOINTS AND BONES:  -No effusion, synovitis, or erosive change. No fracture or bone marrow edema.  -There is a small subchondral cyst in the anterior left acetabulum.     SOFT TISSUES:  -No soft tissue mass.  -There is a 2.3 x 2.9 cm right adnexal cyst.  -Intrauterine device.     MUSCLES:  -Muscle bulk is normal.                                                   IMPRESSION:  1.  No evidence of active sacroiliitis. No acute fracture or bone marrow edema.  2.  Small subchondral cyst in the anterior left acetabulum.  3.  There is a 2.3 x 2.9 cm right adnexal cyst.        2 views pelvis/bilateral hip radiograph(s) 12/19/2023   Findings:     AP view of the pelvis and frog-leg views of both hips were obtained.      No acute osseous abnormality.     No substantial degenerative change of hips. Borderline coxa profunda  bilaterally. Question mild sclerosis of bilateral sacroiliac joints.     Sacrum and innominate bones are partially obscured by overlying bowel  gas/fecal content.     Intrauterine device projects over the pelvis. 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                                                      Impression:  1. No acute osseous abnormality.  2. No substantial degenerative change of either hip        2 views lumbar spine radiographs 12/19/2023    Findings:     Standing  AP and lateral  views of the lumbar spine were obtained.     5  lumbar type vertebral bodies are assumed for the purpose of this  dictation.     There is no acute  osseous abnormality.       Lumbar vertebrae are well aligned. Maintained intervertebral disc  heights. No significant lumbar spine degenerative change. No  significant degenerative change to the sacroiliac joints on these  nondedicated views.     The visualized bowel gas pattern is non-obstructive.     Intrauterine device overlying the pelvis.                                                                      Impression:  1.  No acute osseous abnormality.  2.  No significant lumbar spine degenerative change.  3. No significant sacroiliac joint degenerative change on these  nondedicated views.        Review Of Systems:  I am responding to those symptoms which are directly relevant to the specific indication for my consultation. I recommend that the patient follow up with their primary or referring provider to pursue any other symptoms which may be of concern.       Medical History:  Anxiety     She Past Surgical History:  08/22/2022: NO HISTORY OF SURGERY    Family History  Her family history includes Diabetes in her maternal grandmother; Gallbladder Disease in her mother; Hypertension in her mother; Unknown/Adopted in her father.     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 Performs ADLs/IADLs independently.    She  reports that she has never smoked. She has never used smokeless tobacco. She reports current alcohol use. She reports that she does not use drugs.        Current Medications:   She Current Outpatient Medications:  Ascorbic Acid (VITAMIN C) 100 MG CHEW, , Disp: , Rfl:   cyanocobalamin (VITAMIN B-12) 100 MCG tablet, Take 100 mcg by mouth daily Unknown dosage, Disp: , Rfl:   fluticasone (FLONASE) 50 MCG/ACT nasal spray, SHAKE LIQUID AND USE 1 SPRAY IN EACH NOSTRIL DAILY, Disp: 16 g, Rfl: 0  levonorgestrel (KYLEENA) 19.5 MG IUD, 1 each (19.5 mg) by Intrauterine route once, Disp: , Rfl:   sertraline  (ZOLOFT) 50 MG tablet, Take 1 tablet (50 mg) by mouth daily, Disp: 90 tablet, Rfl: 2  SUMAtriptan (IMITREX) 50 MG tablet, Take 1 tablet (50 mg) by mouth at onset of headache for migraine May repeat in 2 hours. Max 4 tablets/24 hours., Disp: 8 tablet, Rfl: 0  Vitamin D, Cholecalciferol, 10 MCG (400 UNIT) TABS, Unknown dosage, Disp: , Rfl:          Allergies:    -- Seasonal Allergies     PHYSICAL EXAMINATION: *limited by nature of virtual visit   No vital signs taken for visit  --CONSTITUTIONAL: No acute distress.   --PSYCHIATRIC: The patient is awake, alert, oriented to person, place, time and answering questions appropriately with clear speech. Appropriate mood and affect         ASSESSMENT:  Mireya Yoon is a pleasant 23 year old female who presented 12/19/23 with a 4-month history of atraumatic, localized, nonradiating, bilateral low back pain which has improved over time but has remained persistent and varies day-to-day.    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  Thus far RF, ESR, CRP, and HLA-B27 were all normal.  Given the possible episode of sacroiliitis and the persistent pain she is experiencing, we discussed a referral for consultation with rheumatology.  Patient was in agreement and the order was entered  -Given the persistent  low back pain despite course of physical therapy with a home exercise program, will also add an MRI of the lumbar spine.  -We also discussed in detail the option of a diagnostic/therapeutic SI joint steroid injection.  Pending the results of the MRI of the lumbar spine, she is interested in proceeding with the SI joints steroid injection.   -She has Biofreeze and IcyHot on hand and we discussed trial of those topical medications for her chronic low back pain.  Additionally, we discussed that over-the-counter 4% lidocaine patches could be an option as well.  -Continue as needed intermittent naproxen, to be taken with food.  -Unfortunately methocarbamol has not offered a significant level of relief  -Continue PT with home exercise program  RTC pending results of the MRI of the lumbar spine.  Will plan to follow-up with her after the MRI of the lumbar spine, and, barring other more likely etiologies of pain seen on the MRI, she would like to proceed with the SI joint steroid injection      Ready to learn, no apparent learning barriers.  Education provided on treatment plan according to patient's preferred learning style.  Patient verbalizes understanding.   __________________________________  Alicia Galeas NP  Physical Medicine & Rehabilitation      45 minutes spent by me on the date of the encounter doing chart review, history and exam, documentation and further activities per the note

## 2024-02-14 ENCOUNTER — VIRTUAL VISIT (OUTPATIENT)
Dept: PHYSICAL MEDICINE AND REHAB | Facility: CLINIC | Age: 24
End: 2024-02-14
Payer: COMMERCIAL

## 2024-02-14 DIAGNOSIS — M62.838 MUSCLE SPASM: ICD-10-CM

## 2024-02-14 DIAGNOSIS — M25.552 HIP PAIN, LEFT: ICD-10-CM

## 2024-02-14 DIAGNOSIS — M54.50 CHRONIC BILATERAL LOW BACK PAIN WITHOUT SCIATICA: Primary | ICD-10-CM

## 2024-02-14 DIAGNOSIS — M53.3 SCLEROSIS OF SACROILIAC JOINT: ICD-10-CM

## 2024-02-14 DIAGNOSIS — G89.29 CHRONIC BILATERAL LOW BACK PAIN WITHOUT SCIATICA: Primary | ICD-10-CM

## 2024-02-14 DIAGNOSIS — M53.3 SACROILIAC JOINT PAIN: ICD-10-CM

## 2024-02-14 PROCEDURE — 99215 OFFICE O/P EST HI 40 MIN: CPT | Mod: 95 | Performed by: NURSE PRACTITIONER

## 2024-02-14 NOTE — LETTER
2/14/2024       RE: Mireya Yoon  [address]  [facility] [address]     Dear Colleague,    Thank you for referring your patient, Mireya Yoon, to the [facility] PHYSICAL MEDICINE AND REHABILITATION CLINIC [place] at [facility]. Please see a copy of my visit note below.    Mireya is a 23 year old who is being evaluated via a billable video visit.        Joined the call at 2/14/2024, 9:03:26 am.  Left the call at 2/14/2024, 9:22:08 am.  You were on the call for 18 minutes 42 seconds .    PM&R Follow-Up Visit -     Date of Initial Visit: 12/19/23  LOV: 1/8/2024-virtual visit  TD: 2/14/2024     Recall: Mireya Yoon is a 23 year old female who presents with a chief complaint of low back and SI joint pain.          INTERVAL HISTORY:  Patient was last seen via virtual visit 1/8/2024. At that visit, the plan was to await the HLA-B27 result, continue PT with a home exercise program, continue as needed methocarbamol, and consider rheumatology referral.    She was seen today for follow-up via virtual visit.  She has been continuing with physical therapy and home exercise program.  She says that her low back pain has been persistent and fluctuates day-to-day.  She says that today the pain is somewhere in the middle-she has been better, but has also been worse.  She says the persistent pain does affect her mood.    She reports that the physical therapy has been going well and they are focusing on core strengthening and also incorporating some more stretching which she finds helpful.  She does note that during one PT session, the therapist had her doing some side planks on her knees, extending one leg.  She notes that during that exercise she did have 1 transient episode of pain shooting down her leg.  However she has not had any other occurrences of pain radiating to her legs apart from that single incident.    Additionally, she started swimming on her own at the gym as  well.  She says that on Saturday she tried some leg exercises using one of the machines at the gym and did have some increased low back pain after that.    She tried the methocarbamol, but says that it did not seem to be particularly helpful.  When she has a more severe level of pain, she takes naproxen which helps.  She says that she has both Biofreeze and IcyHot but has not tried those for low back pain.      RECALL HISTORY OF PRESENT ILLNESS:  Mireya Yoon is a 23 year old female who presents with a chief complaint of low back pain.     She was seen today in the clinic, accompanied by her boyfriend.    She reports that around 4 months ago she began having bilateral low back pain.  At the time, she was very active doing classes at the gym.  She says the classes did not involve any particularly heavy lifting (max 12 pounds).  She cannot recall any particular injury.  She has since stopped the exercise classes.     Initially, she says the pain was quite significant and she was not able to put on her socks by herself.  She began following with a chiropractor doing electrical stimulation type of therapy along with adjustments and that helped.    Overall, she states the pain has improved since the initial onset. However the pain ebbs and flows from day-to-day and does not ever completely go away.    She describes nonradiating, bilateral low back pain.  In lumbar flexion, she has more pain on the left side.  The pain is also exacerbated with twisting, getting out of bed in the morning, stretching, or sitting on the toilet.  The pain tends to be worse in the morning.  Relieving factors include sitting, laying down, or being still.  Today she rates the pain 4/10.  At its worst over the last week, the pain was 7/10.  Her sleep has not been affected.    Of note, she did physical therapy for left low back/hip discomfort 3 years ago and found it helpful.  She states the quality of the pain this time is somewhat different than  "it was then.     Additionally, she feels as though her left hip tends to be chronically twisted or in some way oriented differently than the right side.  She finds that most noticeable when walking.    She denies falls, weakness, bowel or bladder incontinence, saddle anesthesia, unintentional weight loss, fevers/chills, or night sweats.     12/20/2023:  Patient was last seen in clinic 12/19/2023. At that visit, the plan was to obtain x-rays of the lumbar spine and pelvis and hips, begin physical therapy and a home exercise program, trial methocarbamol, and RTC after 6 weeks of PT/HEP. She had a follow-up today via telehealth for review of the x-ray imaging and to discuss next steps.    1/8/2024:  Patient was last seen via virtual visit 12/20/23. At that visit, the plan was to obtain MRI of the sacrum/coccyx and labs including CRP, ESR, HLA-B27, RF, continue PT/HEP, & trial medrol dosepack and methocarbamol.     She was seen today for follow up. She says she has been working with PT on core muscle strengthening along with a stretching program and says it has been helping her pain level significantly. She says she had some stiffness getting up this morning but not pain per se. She says 3 days ago she had some mild pain in same distribution as prior but says the pain level has overall been much better controlled.     Since last time she also completed the medrol dose pack and says it helped \"tremendously\". She tried methocarbamol 1 time and also felt it was helpful. Since then, she says the pain level hasn't been high enough to warrant taking pain medication.       PRIOR INJURIES/TREATMENT:   Ice/Heat: ice     Brace: none    Physical Therapy:   Currently working with PT  PT in 2022 for thoracic spine pain.  PT in 2020 for left-sided low back pain     - Current Pain Medications -   methocarbamol  Tylenol as needed  Naproxen as needed  Ibuprofen as needed  *Sertraline,   *Sumatriptan, as needed for migraine    - " Prior/Trialed Pain Medications -   Medrol dose pack, Rx 12/20/23      Prior Procedures:  Date    Procedure   Improvement (%)  none              Prior Related Surgery: none     Other (acupuncture, OMT, CMM, TENS, DME, etc.):   Chiropractor occasionally, does stimulation and HVA. Does help    Specialists Seen - (with most recent, available notes and clinic visits reviewed)   1. Sports medicine-2014-left knee   2.  Neurology-headache clinic    LABORATORY  1/5/2024 HLA-B27 negative  Rheumatoid factor, <10 WNL  ESR 7, WNL  CRP, <3.00, WNL    IMAGING - reviewed   MR SACRUM AND COCCYX W/O CONTRAST  1/5/2024   FINDINGS:      SI JOINTS AND BONES:  -No effusion, synovitis, or erosive change. No fracture or bone marrow edema.  -There is a small subchondral cyst in the anterior left acetabulum.     SOFT TISSUES:  -No soft tissue mass.  -There is a 2.3 x 2.9 cm right adnexal cyst.  -Intrauterine device.     MUSCLES:  -Muscle bulk is normal.                                                   IMPRESSION:  1.  No evidence of active sacroiliitis. No acute fracture or bone marrow edema.  2.  Small subchondral cyst in the anterior left acetabulum.  3.  There is a 2.3 x 2.9 cm right adnexal cyst.        2 views pelvis/bilateral hip radiograph(s) 12/19/2023   Findings:     AP view of the pelvis and frog-leg views of both hips were obtained.      No acute osseous abnormality.     No substantial degenerative change of hips. Borderline coxa profunda  bilaterally. Question mild sclerosis of bilateral sacroiliac joints.     Sacrum and innominate bones are partially obscured by overlying bowel  gas/fecal content.     Intrauterine device projects over the pelvis. 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                                                      Impression:  1. No acute osseous abnormality.  2. No substantial degenerative change of either hip        2 views lumbar spine radiographs 12/19/2023    Findings:     Standing  AP and lateral   views of the lumbar spine were obtained.     5  lumbar type vertebral bodies are assumed for the purpose of this  dictation.     There is no acute osseous abnormality.       Lumbar vertebrae are well aligned. Maintained intervertebral disc  heights. No significant lumbar spine degenerative change. No  significant degenerative change to the sacroiliac joints on these  nondedicated views.     The visualized bowel gas pattern is non-obstructive.     Intrauterine device overlying the pelvis.                                                                      Impression:  1.  No acute osseous abnormality.  2.  No significant lumbar spine degenerative change.  3. No significant sacroiliac joint degenerative change on these  nondedicated views.        Review Of Systems:  I am responding to those symptoms which are directly relevant to the specific indication for my consultation. I recommend that the patient follow up with their primary or referring provider to pursue any other symptoms which may be of concern.       Medical History:  Anxiety     She Past Surgical History:  08/22/2022: NO HISTORY OF SURGERY    Family History  Her family history includes Diabetes in her maternal grandmother; Gallbladder Disease in her mother; Hypertension in her mother; Unknown/Adopted in her father.     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 Performs ADLs/IADLs independently.    She  reports that she has never smoked. She has never used smokeless tobacco. She reports current alcohol use. She reports that she does not use drugs.        Current Medications:   She Current Outpatient Medications:  Ascorbic Acid (VITAMIN C) 100 MG CHEW, , Disp: , Rfl:   cyanocobalamin (VITAMIN B-12) 100 MCG tablet, Take 100 mcg by mouth daily Unknown dosage, Disp: , Rfl:   fluticasone (FLONASE) 50 MCG/ACT nasal spray, SHAKE LIQUID AND USE 1 SPRAY IN  EACH NOSTRIL DAILY, Disp: 16 g, Rfl: 0  levonorgestrel (KYLEENA) 19.5 MG IUD, 1 each (19.5 mg) by Intrauterine route once, Disp: , Rfl:   sertraline (ZOLOFT) 50 MG tablet, Take 1 tablet (50 mg) by mouth daily, Disp: 90 tablet, Rfl: 2  SUMAtriptan (IMITREX) 50 MG tablet, Take 1 tablet (50 mg) by mouth at onset of headache for migraine May repeat in 2 hours. Max 4 tablets/24 hours., Disp: 8 tablet, Rfl: 0  Vitamin D, Cholecalciferol, 10 MCG (400 UNIT) TABS, Unknown dosage, Disp: , Rfl:          Allergies:    -- Seasonal Allergies     PHYSICAL EXAMINATION: *limited by nature of virtual visit   No vital signs taken for visit  --CONSTITUTIONAL: No acute distress.   --PSYCHIATRIC: The patient is awake, alert, oriented to person, place, time and answering questions appropriately with clear speech. Appropriate mood and affect         ASSESSMENT:  Mireya Yoon is a pleasant 23 year old female who presented 12/19/23 with a 4-month history of atraumatic, localized, nonradiating, bilateral low back pain which has improved over time but has remained persistent and varies day-to-day.    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  Thus far RF, ESR, CRP, and HLA-B27 were all normal.  Given the possible episode of sacroiliitis and the persistent pain she is  experiencing, we discussed a referral for consultation with rheumatology.  Patient was in agreement and the order was entered  -Given the persistent low back pain despite course of physical therapy with a home exercise program, will also add an MRI of the lumbar spine.  -We also discussed in detail the option of a diagnostic/therapeutic SI joint steroid injection.  Pending the results of the MRI of the lumbar spine, she is interested in proceeding with the SI joints steroid injection.   -She has Biofreeze and IcyHot on hand and we discussed trial of those topical medications for her chronic low back pain.  Additionally, we discussed that over-the-counter 4% lidocaine patches could be an option as well.  -Continue as needed intermittent naproxen, to be taken with food.  -Unfortunately methocarbamol has not offered a significant level of relief  -Continue PT with home exercise program  RTC pending results of the MRI of the lumbar spine.  Will plan to follow-up with her after the MRI of the lumbar spine, and, barring other more likely etiologies of pain seen on the MRI, she would like to proceed with the SI joint steroid injection      Ready to learn, no apparent learning barriers.  Education provided on treatment plan according to patient's preferred learning style.  Patient verbalizes understanding.   __________________________________    45 minutes spent by me on the date of the encounter doing chart review, history and exam, documentation and further activities per the note        Again, thank you for allowing me to participate in the care of your patient.      Sincerely,    SHANTELL Carson CNP

## 2024-02-14 NOTE — NURSING NOTE
Is the patient currently in the state of [place]? YES    Visit mode:VIDEO    If the visit is dropped, the patient can be reconnected by: TELEPHONE VISIT: Phone number:   Telephone Information:   Mobile [phone]       Will anyone else be joining the visit? NO  (If patient encounters technical issues they should call [phone])    How would you like to obtain your AVS? MyChart    Are changes needed to the allergy or medication list? Pt stated no med changes    Reason for visit: Video Visit (Follow-up )    Inez CINTRON

## 2024-02-14 NOTE — PROGRESS NOTES
"Virtual Visit Details    Type of service:  Video Visit     Originating Location (pt. Location): {video visit patient location:102929::\"Home\"}  {PROVIDER LOCATION On-site should be selected for visits conducted from your clinic location or adjoining [facility] hospital, academic office, or other nearby [facility] building. Off-site should be selected for all other provider locations, including home:642973}  Distant Location (provider location):  {virtual location provider:737757}  Platform used for Video Visit: {Virtual Visit Platforms:637836::\"[employer]\"}    "

## 2024-02-28 ENCOUNTER — TELEPHONE (OUTPATIENT)
Dept: PHYSICAL MEDICINE AND REHAB | Facility: CLINIC | Age: 24
End: 2024-02-28
Payer: COMMERCIAL

## 2024-02-28 DIAGNOSIS — M53.3 SCLEROSIS OF SACROILIAC JOINT: ICD-10-CM

## 2024-02-28 DIAGNOSIS — M54.50 CHRONIC BILATERAL LOW BACK PAIN WITHOUT SCIATICA: ICD-10-CM

## 2024-02-28 DIAGNOSIS — M53.3 SACROILIAC JOINT PAIN: Primary | ICD-10-CM

## 2024-02-28 DIAGNOSIS — G89.29 CHRONIC BILATERAL LOW BACK PAIN WITHOUT SCIATICA: ICD-10-CM

## 2024-02-28 NOTE — TELEPHONE ENCOUNTER
Patient is scheduled for surgery with Dr. Tristan    Spoke with: patient    Date of Surgery: 3/11/24    [place] [facility] [place]    Informed patient they will need an adult  yes    Additional comments: Patient is aware of date and time of the procedure.        Keli Valencia MA on 2/28/2024 at 11:13 AM

## 2024-03-11 ENCOUNTER — HOSPITAL ENCOUNTER (OUTPATIENT)
Facility: AMBULATORY SURGERY CENTER | Age: 24
Discharge: HOME | End: 2024-03-11
Attending: PHYSICAL MEDICINE & REHABILITATION | Admitting: PHYSICAL MEDICINE & REHABILITATION
Payer: COMMERCIAL

## 2024-03-11 ENCOUNTER — ANCILLARY PROCEDURE (OUTPATIENT)
Dept: RADIOLOGY | Facility: AMBULATORY SURGERY CENTER | Age: 24
End: 2024-03-11
Attending: PHYSICAL MEDICINE & REHABILITATION
Payer: COMMERCIAL

## 2024-03-11 VITALS
OXYGEN SATURATION: 99 % | HEART RATE: 108 BPM | HEIGHT: 67 IN | TEMPERATURE: 98.4 F | WEIGHT: 193 LBS | BODY MASS INDEX: 30.29 KG/M2 | RESPIRATION RATE: 20 BRPM | SYSTOLIC BLOOD PRESSURE: 130 MMHG | DIASTOLIC BLOOD PRESSURE: 80 MMHG

## 2024-03-11 DIAGNOSIS — R52 PAIN: ICD-10-CM

## 2024-03-11 LAB
HCG UR QL: NEGATIVE
INTERNAL QC OK POCT: NORMAL
POCT KIT EXPIRATION DATE: NORMAL
POCT KIT LOT NUMBER: NORMAL

## 2024-03-11 PROCEDURE — 81025 URINE PREGNANCY TEST: CPT | Performed by: PATHOLOGY

## 2024-03-11 PROCEDURE — G0260 INJ FOR SACROILIAC JT ANESTH: HCPCS | Mod: RT

## 2024-03-11 RX ORDER — TRIAMCINOLONE ACETONIDE 40 MG/ML
INJECTION, SUSPENSION INTRA-ARTICULAR; INTRAMUSCULAR PRN
Status: DISCONTINUED | OUTPATIENT
Start: 2024-03-11 | End: 2024-03-11 | Stop reason: HOSPADM

## 2024-03-11 RX ORDER — LIDOCAINE HYDROCHLORIDE 10 MG/ML
INJECTION, SOLUTION EPIDURAL; INFILTRATION; INTRACAUDAL; PERINEURAL PRN
Status: DISCONTINUED | OUTPATIENT
Start: 2024-03-11 | End: 2024-03-11 | Stop reason: HOSPADM

## 2024-03-11 NOTE — OP NOTE
PROCEDURE NOTE: Sacroiliac Joint Injection    PROCEDURE DATE: 3/11/2024    PATIENT NAME: Mireya Yoon  YOB: 2000    ATTENDING PHYSICIAN: Marcus Tristan MD   RESIDENT/FELLOW: None    PREOPERATIVE DIAGNOSIS: Bilateral Sacroiliac Joint Pain  POSTOPERATIVE DIAGNOSIS: Same    PROCEDURE PERFORMED: Bilateral Sacroiliac joint injection with fluoroscopic guidance      FLUOROSCOPY WAS USED.     INDICATIONS FOR PROCEDURE:   Mireya Yoon is a 23 year old female with a clinical picture consistent with Bilateral sacroiliac joint pain.    PROCEDURE AND FINDINGS:    She was greeted in the pre-procedure holding area. The risk, benefits and alternatives to the procedure were again reviewed with the patient and written informed consent was placed in the chart. An IV line was not placed.  A 500 mL bag of NS was not connected to the patient. She was taken to the procedure room and positioned prone on the fluoroscopy table.  Routine monitors were applied including EKG leads (if sedation was used), blood pressure cuff, and pulse oximetry. Prior to the procedure a time out was completed, verifying correct patient, procedure, site, positioning, and implants and/or special equipment.     The skin was prepped with chlorhexidine and draped in the usual sterile fashion.  A  film was taken to identify the Bilateral sacroiliac joint and the inferior most intersection of the anterior and posterior sacroiliac joint lines. The overlying skin and subcutaneous tissues were anesthetized using a 27-gauge 1-1/4 inch needle with 1% preservative-free lidocaine for a total volume of 1.5 mls, per side. A 22-gauge 5-inch Quincke spinal needle was advanced into the Bilateral sacroiliac joint space under intermittent fluoroscopic guidance. Needle position was confirmed on both AP and lateral views.     Then, after negative aspiration, 0.5mL Isovue-M contrast was injected and confirmed adequate intraarticular spread. There was no evidence of  intravascular uptake. At this point, after negative aspiration, a total of 2.5mL volume of treatment injectate, consisting of 1mL Kenalog (40mg/mL) and 1.5mL of 1% Lidocaine, was injected easily per side.  The needle was then flushed with 0.5 ml of local anesthetic and removed. The needle insertion site was dressed appropriately.     Mireya Yoon was taken to the recovery room where she was monitored for a brief period of time. She tolerated the procedure well and was discharged home in stable condition with post procedural instructions.     Before the procedure, she reported a pain score of 2/10.   After the procedure, she reported a pain score of 0/10.     [facility] with Alicia Galeas NP      COMPLICATIONS: None    COMMENTS: None

## 2024-03-11 NOTE — DISCHARGE INSTRUCTIONS
Home Care Instructions after a Sacroiliac Joint Injection        Activity  -You may resume most normal activity levels with the exception of strenuous activity. It is important for us to know if your pain with normal activity is relieved after this injection.  -DO NOT shower for 24 hours  -DO NOT remove bandaid for 24 hours    Pain  -You may experience soreness at the injection site for one or two days  -You may use an ice pack for 20 minutes every 2 hours for the first 24 hours  -You may use a heating pad after the first 24 hours  -You may use Tylenol (acetaminophen) every 4 hours or other pain medicines as directed by your physician    You may experience numbness radiating into your legs or arms (depending on the procedure location). This numbness may last several hours. Until sensation returns to normal; please use caution in walking, climbing stairs, and stepping out of your vehicle, etc.    DID YOU RECEIVE SEDATION TODAY?  No    DID YOU RECEIVE STEROIDS TODAY?  Yes    Common side effects of steroids:  Not everyone will experience corticosteroid side effects. If side effects are experienced, they will gradually subside in the 7-10 day period following an injection. Most common side effects include:  -Flushed face and/or chest  -Feeling of warmth, particularly in the face but could be an overall feeling of warmth  -Increased blood sugar in diabetic patients  -Menstrual irregularities my occur. If taking hormone-based birth control an alternate method of birth control is recommended  -Sleep disturbances and/or mood swings are possible  -Leg cramps      PLEASE KEEP TRACK OF YOUR SYMPTOMS AND NOTE YOUR IMPROVEMENT FOR YOUR DOCTOR.     Please contact us if you have:  -Severe pain  -Fever more than 101.5 degrees Fahrenheit  -Signs of infection at the injection site (redness, swelling, or drainage)    FOR PAIN CENTER PATIENTS:  If you have questions, please contact the Pain Clinic at [phone] Option #1 between the  hours of 7:00 am and 3:00 pm Monday through Friday. After office hours you can contact the on call provider by dialing [phone]. If you need immediate attention, we recommend that you go to a hospital emergency room or dial [phone].      FOR PM&R PATIENTS:  For patients seen by the PM and R service, please call [phone]. (Monday through Friday 8a-5p.  After business hours and weekends call [phone] and ask for the PM and R doctor on call). If you need to fax a pain diary to PM&R the fax number is [phone]. If you are unable to fax, uploading to [facility] [employer] is encouraged, then send to provider. If you have procedure scheduling questions please call [phone] Option #2.

## 2024-03-14 DIAGNOSIS — J01.90 ACUTE SINUSITIS, RECURRENCE NOT SPECIFIED, UNSPECIFIED LOCATION: ICD-10-CM

## 2024-03-14 RX ORDER — FLUTICASONE PROPIONATE 50 MCG
1 SPRAY, SUSPENSION (ML) NASAL DAILY
Qty: 16 G | Refills: 0 | Status: SHIPPED | OUTPATIENT
Start: 2024-03-14 | End: 2024-05-19

## 2024-03-25 NOTE — PROGRESS NOTES
"Mireya is a 23 year old who is being evaluated via a billable video visit.      Video-Visit Details    Joined the call at 3/27/2024, 8:42:59 am.  Left the call at 3/27/2024, 8:51:40 am.  You were on the call for 8 minutes 40 seconds .    Type of service:  Video Visit   Originating Location (pt. Location): Home  Distant Location (provider location):  Off-site  Platform used for Video Visit: Delvin        PM&R Follow-Up Visit -     Date of Initial Visit: 12/19/23  LOV: 3/11/2024 - procedure visit  TD: 3/27/2024     Recall:  Mireya Yoon is a 23 year old female who presents with a chief complaint of low back and SI joint pain.        INTERVAL HISTORY:  Patient was last seen in clinic 3/11/2024 with Dr Tristan for bilateral SI joint steroid injections.     She was seen today for follow-up via telehealth after the injection.  She states that the bilateral SI joint steroid injection helped relieve her pain significantly.  She reports a \"90%\" improvement in the level of the pain.  She says that she has gone back to the gym and started some of her weightlifting program.  She does find that some of the exercises can aggravate her SI joints and so she tends to modify the routine. Overall, she says that she is feeling much better and that her mood has also improved.  She only notices pain now with some weight training.  Activities which were previously bothersome are no longer a problem, such as bending, getting up and out of bed, and getting in and out of a car.  She has not been using any pain medication.  She was participating with physical therapy, but says there was an issue with billing and her insurance in which she was being billed a full amount for the visits.  She is temporarily holding off on PT while that issue is sorted out.  She expressed some interest in [facility] in the future.      RECALL HISTORY OF PRESENT ILLNESS:  Mireya Yoon is a 23 year old female who presents with a chief complaint of low back pain.     She " was seen today in the clinic, accompanied by her boyfriend.    She reports that around 4 months ago she began having bilateral low back pain.  At the time, she was very active doing classes at the gym.  She says the classes did not involve any particularly heavy lifting (max 12 pounds).  She cannot recall any particular injury.  She has since stopped the exercise classes.     Initially, she says the pain was quite significant and she was not able to put on her socks by herself.  She began following with a chiropractor doing electrical stimulation type of therapy along with adjustments and that helped.    Overall, she states the pain has improved since the initial onset. However the pain ebbs and flows from day-to-day and does not ever completely go away.    She describes nonradiating, bilateral low back pain.  In lumbar flexion, she has more pain on the left side.  The pain is also exacerbated with twisting, getting out of bed in the morning, stretching, or sitting on the toilet.  The pain tends to be worse in the morning.  Relieving factors include sitting, laying down, or being still.  Today she rates the pain 4/10.  At its worst over the last week, the pain was 7/10.  Her sleep has not been affected.    Of note, she did physical therapy for left low back/hip discomfort 3 years ago and found it helpful.  She states the quality of the pain this time is somewhat different than it was then.     Additionally, she feels as though her left hip tends to be chronically twisted or in some way oriented differently than the right side.  She finds that most noticeable when walking.    She denies falls, weakness, bowel or bladder incontinence, saddle anesthesia, unintentional weight loss, fevers/chills, or night sweats.     12/20/2023:  Patient was last seen in clinic 12/19/2023. At that visit, the plan was to obtain x-rays of the lumbar spine and pelvis and hips, begin physical therapy and a home exercise program, trial  "methocarbamol, and RTC after 6 weeks of PT/HEP. She had a follow-up today via telehealth for review of the x-ray imaging and to discuss next steps.    1/8/2024:  Patient was last seen via virtual visit 12/20/23. At that visit, the plan was to obtain MRI of the sacrum/coccyx and labs including CRP, ESR, HLA-B27, RF, continue PT/HEP, & trial medrol dosepack and methocarbamol.     She was seen today for follow up. She says she has been working with PT on core muscle strengthening along with a stretching program and says it has been helping her pain level significantly. She says she had some stiffness getting up this morning but not pain per se. She says 3 days ago she had some mild pain in same distribution as prior but says the pain level has overall been much better controlled.     Since last time she also completed the medrol dose pack and says it helped \"tremendously\". She tried methocarbamol 1 time and also felt it was helpful. Since then, she says the pain level hasn't been high enough to warrant taking pain medication.       PRIOR INJURIES/TREATMENT:   Ice/Heat: ice     Brace: none    Physical Therapy:   Currently working with PT  PT in 2022 for thoracic spine pain.  PT in 2020 for left-sided low back pain     - Current Pain Medications -   Tylenol as needed  Naproxen as needed  Ibuprofen as needed  *Sertraline   *Sumatriptan, as needed for migraine    - Prior/Trialed Pain Medications -   Medrol dose pack, Rx 12/20/23  Methocarbamol      Prior Procedures:  Date    Procedure   Improvement (%)  none              Prior Related Surgery: none     Other (acupuncture, OMT, CMM, TENS, DME, etc.):   Chiropractor occasionally, does stimulation and HVA. Does help    Specialists Seen - (with most recent, available notes and clinic visits reviewed)   1. Sports medicine-2014-left knee   2.  Neurology-headache clinic    LABORATORY  1/5/2024 HLA-B27 negative  Rheumatoid factor, <10 WNL  ESR 7, WNL  CRP, <3.00, WNL    IMAGING - " reviewed   MR SACRUM AND COCCYX W/O CONTRAST  1/5/2024   FINDINGS:      SI JOINTS AND BONES:  -No effusion, synovitis, or erosive change. No fracture or bone marrow edema.  -There is a small subchondral cyst in the anterior left acetabulum.     SOFT TISSUES:  -No soft tissue mass.  -There is a 2.3 x 2.9 cm right adnexal cyst.  -Intrauterine device.     MUSCLES:  -Muscle bulk is normal.                                                   IMPRESSION:  1.  No evidence of active sacroiliitis. No acute fracture or bone marrow edema.  2.  Small subchondral cyst in the anterior left acetabulum.  3.  There is a 2.3 x 2.9 cm right adnexal cyst.        2 views pelvis/bilateral hip radiograph(s) 12/19/2023   Findings:     AP view of the pelvis and frog-leg views of both hips were obtained.      No acute osseous abnormality.     No substantial degenerative change of hips. Borderline coxa profunda  bilaterally. Question mild sclerosis of bilateral sacroiliac joints.     Sacrum and innominate bones are partially obscured by overlying bowel  gas/fecal content.     Intrauterine device projects over the pelvis. 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                                                      Impression:  1. No acute osseous abnormality.  2. No substantial degenerative change of either hip        2 views lumbar spine radiographs 12/19/2023    Findings:     Standing  AP and lateral  views of the lumbar spine were obtained.     5  lumbar type vertebral bodies are assumed for the purpose of this  dictation.     There is no acute osseous abnormality.       Lumbar vertebrae are well aligned. Maintained intervertebral disc  heights. No significant lumbar spine degenerative change. No  significant degenerative change to the sacroiliac joints on these  nondedicated views.     The visualized bowel gas pattern is non-obstructive.     Intrauterine device overlying the pelvis.                                                                       Impression:  1.  No acute osseous abnormality.  2.  No significant lumbar spine degenerative change.  3. No significant sacroiliac joint degenerative change on these  nondedicated views.        Review Of Systems:  I am responding to those symptoms which are directly relevant to the specific indication for my consultation. I recommend that the patient follow up with their primary or referring provider to pursue any other symptoms which may be of concern.       Medical History:  Anxiety     She Past Surgical History:  08/22/2022: NO HISTORY OF SURGERY    Family History  Her family history includes Diabetes in her maternal grandmother; Gallbladder Disease in her mother; Hypertension in her mother; Unknown/Adopted in her father.     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 Performs ADLs/IADLs independently.    She  reports that she has never smoked. She has never used smokeless tobacco. She reports current alcohol use. She reports that she does not use drugs.        Current Medications:   She Current Outpatient Medications:  Ascorbic Acid (VITAMIN C) 100 MG CHEW, , Disp: , Rfl:   cyanocobalamin (VITAMIN B-12) 100 MCG tablet, Take 100 mcg by mouth daily Unknown dosage, Disp: , Rfl:   fluticasone (FLONASE) 50 MCG/ACT nasal spray, SHAKE LIQUID AND USE 1 SPRAY IN EACH NOSTRIL DAILY, Disp: 16 g, Rfl: 0  levonorgestrel (KYLEENA) 19.5 MG IUD, 1 each (19.5 mg) by Intrauterine route once, Disp: , Rfl:   sertraline (ZOLOFT) 50 MG tablet, Take 1 tablet (50 mg) by mouth daily, Disp: 90 tablet, Rfl: 2  SUMAtriptan (IMITREX) 50 MG tablet, Take 1 tablet (50 mg) by mouth at onset of headache for migraine May repeat in 2 hours. Max 4 tablets/24 hours., Disp: 8 tablet, Rfl: 0  Vitamin D, Cholecalciferol, 10 MCG (400 UNIT) TABS, Unknown dosage, Disp: , Rfl:          Allergies:    -- Seasonal Allergies     PHYSICAL  EXAMINATION: *limited by nature of virtual visit   No vital signs taken for visit  --CONSTITUTIONAL: No acute distress.   --PSYCHIATRIC: The patient is awake, alert, oriented to person, place, time and answering questions appropriately with clear speech. Appropriate mood and affect         ASSESSMENT:  Mireya Yoon is a pleasant 23 year old female who presented 12/19/23 with a 4-month history of atraumatic, localized, nonradiating, bilateral low back pain which has improved over time but has remained persistent and varies day-to-day.      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 We discussed that the steroid injections could be repeated in the future, no sooner than 3 months after the prior injections  -At prior visit I had discussed the sacral/coccyx MRI imaging findings along with the reading radiologist and per that conversation, there was a possible tiny degree of sclerosis involving the inferior aspects of both SI joints, R>L, but not a striking amount - possibly related to a small episode of sacroiliitis in the past?  Thus far RF, ESR, CRP, and HLA-B27 were all normal.  Given the possible episode of sacroiliitis, we discussed a referral for consultation with rheumatology. Rheum referral entered at last visit but pt states she did not receive a call. Rheumatology referral was re-entered.  -PT was on hold due to insurance issue. Encourage to continue with HEP.  -She expressed interest in [facility] program in the future  -RTC as needed          Ready to learn, no apparent learning barriers.  Education provided on treatment plan according to  patient's preferred learning style.  Patient verbalizes understanding.   __________________________________  Alicia Galeas NP  Physical Medicine & Rehabilitation

## 2024-03-27 ENCOUNTER — VIRTUAL VISIT (OUTPATIENT)
Dept: PHYSICAL MEDICINE AND REHAB | Facility: CLINIC | Age: 24
End: 2024-03-27
Payer: COMMERCIAL

## 2024-03-27 VITALS — HEIGHT: 67 IN | BODY MASS INDEX: 30.29 KG/M2 | WEIGHT: 193 LBS

## 2024-03-27 DIAGNOSIS — M53.3 SCLEROSIS OF SACROILIAC JOINT: ICD-10-CM

## 2024-03-27 DIAGNOSIS — M54.50 CHRONIC BILATERAL LOW BACK PAIN WITHOUT SCIATICA: ICD-10-CM

## 2024-03-27 DIAGNOSIS — G89.29 CHRONIC BILATERAL LOW BACK PAIN WITHOUT SCIATICA: ICD-10-CM

## 2024-03-27 DIAGNOSIS — M53.3 SACROILIAC JOINT PAIN: Primary | ICD-10-CM

## 2024-03-27 PROCEDURE — 99213 OFFICE O/P EST LOW 20 MIN: CPT | Mod: 95 | Performed by: NURSE PRACTITIONER

## 2024-03-27 ASSESSMENT — PAIN SCALES - GENERAL: PAINLEVEL: NO PAIN (0)

## 2024-03-27 NOTE — LETTER
"3/27/2024       RE: Mireya Yoon  [address]  [facility] [address]     Dear Colleague,    Thank you for referring your patient, Mireya Yoon, to the [facility] [place] [facility]. Please see a copy of my visit note below.    Mireya is a 23 year old who is being evaluated via a billable video visit.      Joined the call at 3/27/2024, 8:42:59 am.  Left the call at 3/27/2024, 8:51:40 am.  You were on the call for 8 minutes 40 seconds .        PM&R Follow-Up Visit -     Date of Initial Visit: 12/19/23  LOV: 3/11/2024 - procedure visit  TD: 3/27/2024     Recall:  Mireya Yoon is a 23 year old female who presents with a chief complaint of low back and SI joint pain.        INTERVAL HISTORY:  Patient was last seen in clinic 3/11/2024 with Dr Tristan for bilateral SI joint steroid injections.     She was seen today for follow-up via telehealth after the injection.  She states that the bilateral SI joint steroid injection helped relieve her pain significantly.  She reports a \"90%\" improvement in the level of the pain.  She says that she has gone back to the gym and started some of her weightlifting program.  She does find that some of the exercises can aggravate her SI joints and so she tends to modify the routine. Overall, she says that she is feeling much better and that her mood has also improved.  She only notices pain now with some weight training.  Activities which were previously bothersome are no longer a problem, such as bending, getting up and out of bed, and getting in and out of a car.  She has not been using any pain medication.  She was participating with physical therapy, but says there was an issue with billing and her insurance in which she was being billed a full amount for the visits.  She is temporarily holding off on PT while that issue is sorted out.  She expressed some interest in [facility] PT in the " future.      RECALL HISTORY OF PRESENT ILLNESS:  Mireya Yoon is a 23 year old female who presents with a chief complaint of low back pain.     She was seen today in the clinic, accompanied by her boyfriend.    She reports that around 4 months ago she began having bilateral low back pain.  At the time, she was very active doing classes at the gym.  She says the classes did not involve any particularly heavy lifting (max 12 pounds).  She cannot recall any particular injury.  She has since stopped the exercise classes.     Initially, she says the pain was quite significant and she was not able to put on her socks by herself.  She began following with a chiropractor doing electrical stimulation type of therapy along with adjustments and that helped.    Overall, she states the pain has improved since the initial onset. However the pain ebbs and flows from day-to-day and does not ever completely go away.    She describes nonradiating, bilateral low back pain.  In lumbar flexion, she has more pain on the left side.  The pain is also exacerbated with twisting, getting out of bed in the morning, stretching, or sitting on the toilet.  The pain tends to be worse in the morning.  Relieving factors include sitting, laying down, or being still.  Today she rates the pain 4/10.  At its worst over the last week, the pain was 7/10.  Her sleep has not been affected.    Of note, she did physical therapy for left low back/hip discomfort 3 years ago and found it helpful.  She states the quality of the pain this time is somewhat different than it was then.     Additionally, she feels as though her left hip tends to be chronically twisted or in some way oriented differently than the right side.  She finds that most noticeable when walking.    She denies falls, weakness, bowel or bladder incontinence, saddle anesthesia, unintentional weight loss, fevers/chills, or night sweats.     12/20/2023:  Patient was last seen in clinic 12/19/2023. At  "that visit, the plan was to obtain x-rays of the lumbar spine and pelvis and hips, begin physical therapy and a home exercise program, trial methocarbamol, and RTC after 6 weeks of PT/HEP. She had a follow-up today via telehealth for review of the x-ray imaging and to discuss next steps.    1/8/2024:  Patient was last seen via virtual visit 12/20/23. At that visit, the plan was to obtain MRI of the sacrum/coccyx and labs including CRP, ESR, HLA-B27, RF, continue PT/HEP, & trial medrol dosepack and methocarbamol.     She was seen today for follow up. She says she has been working with PT on core muscle strengthening along with a stretching program and says it has been helping her pain level significantly. She says she had some stiffness getting up this morning but not pain per se. She says 3 days ago she had some mild pain in same distribution as prior but says the pain level has overall been much better controlled.     Since last time she also completed the medrol dose pack and says it helped \"tremendously\". She tried methocarbamol 1 time and also felt it was helpful. Since then, she says the pain level hasn't been high enough to warrant taking pain medication.       PRIOR INJURIES/TREATMENT:   Ice/Heat: ice     Brace: none    Physical Therapy:   Currently working with PT  PT in 2022 for thoracic spine pain.  PT in 2020 for left-sided low back pain     - Current Pain Medications -   Tylenol as needed  Naproxen as needed  Ibuprofen as needed  *Sertraline   *Sumatriptan, as needed for migraine    - Prior/Trialed Pain Medications -   Medrol dose pack, Rx 12/20/23  Methocarbamol      Prior Procedures:  Date    Procedure   Improvement (%)  none              Prior Related Surgery: none     Other (acupuncture, OMT, CMM, TENS, DME, etc.):   Chiropractor occasionally, does stimulation and HVA. Does help    Specialists Seen - (with most recent, available notes and clinic visits reviewed)   1. Sports medicine-2014-left knee "   2.  Neurology-headache clinic    LABORATORY  1/5/2024 HLA-B27 negative  Rheumatoid factor, <10 WNL  ESR 7, WNL  CRP, <3.00, WNL    IMAGING - reviewed   MR SACRUM AND COCCYX W/O CONTRAST  1/5/2024   FINDINGS:      SI JOINTS AND BONES:  -No effusion, synovitis, or erosive change. No fracture or bone marrow edema.  -There is a small subchondral cyst in the anterior left acetabulum.     SOFT TISSUES:  -No soft tissue mass.  -There is a 2.3 x 2.9 cm right adnexal cyst.  -Intrauterine device.     MUSCLES:  -Muscle bulk is normal.                                                   IMPRESSION:  1.  No evidence of active sacroiliitis. No acute fracture or bone marrow edema.  2.  Small subchondral cyst in the anterior left acetabulum.  3.  There is a 2.3 x 2.9 cm right adnexal cyst.        2 views pelvis/bilateral hip radiograph(s) 12/19/2023   Findings:     AP view of the pelvis and frog-leg views of both hips were obtained.      No acute osseous abnormality.     No substantial degenerative change of hips. Borderline coxa profunda  bilaterally. Question mild sclerosis of bilateral sacroiliac joints.     Sacrum and innominate bones are partially obscured by overlying bowel  gas/fecal content.     Intrauterine device projects over the pelvis. 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                                                      Impression:  1. No acute osseous abnormality.  2. No substantial degenerative change of either hip        2 views lumbar spine radiographs 12/19/2023    Findings:     Standing  AP and lateral  views of the lumbar spine were obtained.     5  lumbar type vertebral bodies are assumed for the purpose of this  dictation.     There is no acute osseous abnormality.       Lumbar vertebrae are well aligned. Maintained intervertebral disc  heights. No significant lumbar spine degenerative change. No  significant degenerative change to the sacroiliac joints on these  nondedicated views.     The visualized  bowel gas pattern is non-obstructive.     Intrauterine device overlying the pelvis.                                                                      Impression:  1.  No acute osseous abnormality.  2.  No significant lumbar spine degenerative change.  3. No significant sacroiliac joint degenerative change on these  nondedicated views.        Review Of Systems:  I am responding to those symptoms which are directly relevant to the specific indication for my consultation. I recommend that the patient follow up with their primary or referring provider to pursue any other symptoms which may be of concern.       Medical History:  Anxiety     She Past Surgical History:  08/22/2022: NO HISTORY OF SURGERY    Family History  Her family history includes Diabetes in her maternal grandmother; Gallbladder Disease in her mother; Hypertension in her mother; Unknown/Adopted in her father.     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 Performs ADLs/IADLs independently.    She  reports that she has never smoked. She has never used smokeless tobacco. She reports current alcohol use. She reports that she does not use drugs.        Current Medications:   She Current Outpatient Medications:  Ascorbic Acid (VITAMIN C) 100 MG CHEW, , Disp: , Rfl:   cyanocobalamin (VITAMIN B-12) 100 MCG tablet, Take 100 mcg by mouth daily Unknown dosage, Disp: , Rfl:   fluticasone (FLONASE) 50 MCG/ACT nasal spray, SHAKE LIQUID AND USE 1 SPRAY IN EACH NOSTRIL DAILY, Disp: 16 g, Rfl: 0  levonorgestrel (KYLEENA) 19.5 MG IUD, 1 each (19.5 mg) by Intrauterine route once, Disp: , Rfl:   sertraline (ZOLOFT) 50 MG tablet, Take 1 tablet (50 mg) by mouth daily, Disp: 90 tablet, Rfl: 2  SUMAtriptan (IMITREX) 50 MG tablet, Take 1 tablet (50 mg) by mouth at onset of headache for migraine May repeat in 2 hours. Max 4 tablets/24 hours., Disp: 8 tablet, Rfl:  0  Vitamin D, Cholecalciferol, 10 MCG (400 UNIT) TABS, Unknown dosage, Disp: , Rfl:          Allergies:    -- Seasonal Allergies     PHYSICAL EXAMINATION: *limited by nature of virtual visit   No vital signs taken for visit  --CONSTITUTIONAL: No acute distress.   --PSYCHIATRIC: The patient is awake, alert, oriented to person, place, time and answering questions appropriately with clear speech. Appropriate mood and affect         ASSESSMENT:  Mireya Yoon is a pleasant 23 year old female who presented 12/19/23 with a 4-month history of atraumatic, localized, nonradiating, bilateral low back pain which has improved over time but has remained persistent and varies day-to-day.      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 We discussed that the steroid injections could be repeated in the future, no sooner than 3 months after the prior injections  -At prior visit I had discussed the sacral/coccyx MRI imaging findings along with the reading radiologist and per that conversation, there was a possible tiny degree of sclerosis involving the inferior aspects of both SI joints, R>L, but not a striking amount - possibly related to a small episode of sacroiliitis in the past?  Thus far RF, ESR, CRP, and HLA-B27 were all normal.  Given the possible episode of sacroiliitis, we discussed a referral for consultation with rheumatology. Rheum referral entered at last visit but pt states she did not receive a call. Rheumatology referral was re-entered.  -PT was on hold due to insurance issue. Encourage to continue with HEP.  -She expressed interest in [facility]  program in the future  -RTC as needed          Ready to learn, no apparent learning barriers.  Education provided on treatment plan according to patient's preferred learning style.  Patient verbalizes understanding.   __________________________________        Again, thank you for allowing me to participate in the care of your patient.      Sincerely,    SHANTELL Carson CNP

## 2024-03-27 NOTE — NURSING NOTE
Is the patient currently in the state of [place]? YES    Visit mode:VIDEO    If the visit is dropped, the patient can be reconnected by: VIDEO VISIT: Text to cell phone:   Telephone Information:   Mobile [phone]       Will anyone else be joining the visit? NO  (If patient encounters technical issues they should call [phone]    How would you like to obtain your AVS? MyChart    Are changes needed to the allergy or medication list? No    Reason for visit: Follow Up    Kanwal CINTRON

## 2024-04-09 ENCOUNTER — TELEPHONE (OUTPATIENT)
Dept: OTOLARYNGOLOGY | Facility: CLINIC | Age: 24
End: 2024-04-09
Payer: COMMERCIAL

## 2024-04-26 NOTE — TELEPHONE ENCOUNTER
"FUTURE VISIT INFORMATION      FUTURE VISIT INFORMATION:  Date: 8/2/24  Time: 1:pm  [facility]  REFERRAL INFORMATION:  Referring provider:  Harjinder Segundo NP   Referring providers clinic:  [facility]   Reason for visit/diagnosis  Chronic maxillary sinusitis, apt per Pt  [place] 7/12 r/s     RECORDS REQUESTED FROM:       Clinic name Comments Records Status Imaging Status   [facility]  8/8/23- OV Harjinder Segundo NP  [facility]  5/4/22- OV Cheli Ruano DO  [facility]  8/26/20- OV Juanito Parry MD  [facility]     Imaging  8/21/20- CT Head  7/30/20- MR Brain  [facility]  PACS        \"Please notify/message CSS if patient completed outside imaging prior to scheduled appointment and/or any outside records that might have been missed at pre visit -Thank you\"  "

## 2024-05-19 DIAGNOSIS — J01.90 ACUTE SINUSITIS, RECURRENCE NOT SPECIFIED, UNSPECIFIED LOCATION: ICD-10-CM

## 2024-05-20 RX ORDER — FLUTICASONE PROPIONATE 50 MCG
1 SPRAY, SUSPENSION (ML) NASAL DAILY
Qty: 16 G | Refills: 0 | Status: SHIPPED | OUTPATIENT
Start: 2024-05-20 | End: 2024-07-18

## 2024-07-09 ENCOUNTER — PATIENT OUTREACH (OUTPATIENT)
Dept: CARE COORDINATION | Facility: CLINIC | Age: 24
End: 2024-07-09
Payer: COMMERCIAL

## 2024-07-18 DIAGNOSIS — J01.90 ACUTE SINUSITIS, RECURRENCE NOT SPECIFIED, UNSPECIFIED LOCATION: ICD-10-CM

## 2024-07-18 RX ORDER — FLUTICASONE PROPIONATE 50 MCG
1 SPRAY, SUSPENSION (ML) NASAL DAILY
Qty: 16 G | Refills: 0 | Status: SHIPPED | OUTPATIENT
Start: 2024-07-18

## 2024-07-23 ENCOUNTER — PATIENT OUTREACH (OUTPATIENT)
Dept: CARE COORDINATION | Facility: CLINIC | Age: 24
End: 2024-07-23
Payer: COMMERCIAL

## 2024-07-31 ENCOUNTER — TELEPHONE (OUTPATIENT)
Dept: FAMILY MEDICINE | Facility: CLINIC | Age: 24
End: 2024-07-31
Payer: COMMERCIAL

## 2024-07-31 NOTE — TELEPHONE ENCOUNTER
Patient Quality Outreach    Patient is due for the following:   Physical Preventive Adult Physical,  - Due after 8/8/24    Next Steps:   Schedule a Adult Preventative    Type of outreach:    Sent WebTV message.      Questions for provider review:    None           Lynn Gardner CMA  Chart routed to Care Team.

## 2024-08-02 ENCOUNTER — PRE VISIT (OUTPATIENT)
Dept: OTOLARYNGOLOGY | Facility: CLINIC | Age: 24
End: 2024-08-02

## 2024-09-24 ENCOUNTER — OFFICE VISIT (OUTPATIENT)
Dept: MIDWIFE SERVICES | Facility: CLINIC | Age: 24
End: 2024-09-24

## 2024-09-24 VITALS
DIASTOLIC BLOOD PRESSURE: 91 MMHG | HEIGHT: 67 IN | WEIGHT: 187.9 LBS | HEART RATE: 117 BPM | BODY MASS INDEX: 29.49 KG/M2 | OXYGEN SATURATION: 99 % | SYSTOLIC BLOOD PRESSURE: 141 MMHG | TEMPERATURE: 99.5 F

## 2024-09-24 DIAGNOSIS — N63.25 MASS OVERLAPPING MULTIPLE QUADRANTS OF LEFT BREAST: Primary | ICD-10-CM

## 2024-09-24 PROCEDURE — 99213 OFFICE O/P EST LOW 20 MIN: CPT | Performed by: ADVANCED PRACTICE MIDWIFE

## 2024-09-24 RX ORDER — SPIRONOLACTONE 100 MG/1
100 TABLET, FILM COATED ORAL DAILY
COMMUNITY
Start: 2024-07-22

## 2024-09-24 NOTE — PROGRESS NOTES
SUBJECTIVE:  Mireya Yoon is a 24 year old female who presents with complaint of left breast mass noticed over the last week. Report of pain: complains of tenderness but acknowledges that it is likely due to persistent palpation as it was not initially tender.  Patient denies redness or discharge. Initially scheduled this visit as an annual but does not have insurance coverage at this time, she is mainly concerned about her breast. Will plan to return for annual exam in October once insurance is active. Of note, patient has Kyleena and does not get regular periods but did recently just start a period.          Patients past history is significant for anxiety. Has been on Zoloft since she was 10 years old and discontinued this about one month ago.     Review of patient's family history indicates:   Family History   Problem Relation Age of Onset    Hypertension Mother     Gallbladder Disease Mother     Unknown/Adopted Father     Diabetes Maternal Grandmother     Glaucoma No family hx of     Macular Degeneration No family hx of     Colon Cancer No family hx of     Ovarian Cancer No family hx of     Breast Cancer No family hx of             OBJECTIVE:  Lungs: clear to auscultation  Heart:  regular rate and rythm without murmur  Breast:  symmetrical, slightly dense breast tissue of left breast > right. Area of horizontal tissue striation prominent in left breast extending from about 11o'clock to 2 o'clock about one centimeter above areola so tissue extends from upper inner quadrant to upper outer quadrant although there are no defined masses.   no palpable mass  no nipple discharge  no axillary adenopathy                 ASSESSMENT:  Breast lump         PLAN:  (N63.25) Mass overlapping multiple quadrants of left breast  (primary encounter diagnosis)  Comment:   Plan: MA Diagnostic Digital Bilateral, US Breast Left        Limited 1-3 Quadrants          Patient is in tears and verbalizes her anxiety won't allow her to move  past this. She is hoping for reassurance that this is normal breast tissue. I explained that the breast tissue does feel normal to me and that I have no specific concerns, although she does have obviously dense breast tissue. I also explained that anytime comes in with a complaint of abnormal tissue in their breast I will always order imaging to be on the safe side. It is up to her if she decides to move forward with scheduling it and it can definitely wait until after her insurance is active.     Encouraged patient to return in October for annual exam     Rylie Soto CNM

## 2024-09-27 ENCOUNTER — ANCILLARY PROCEDURE (OUTPATIENT)
Dept: MAMMOGRAPHY | Facility: CLINIC | Age: 24
End: 2024-09-27
Attending: ADVANCED PRACTICE MIDWIFE
Payer: COMMERCIAL

## 2024-09-27 ENCOUNTER — ANCILLARY PROCEDURE (OUTPATIENT)
Dept: MAMMOGRAPHY | Facility: CLINIC | Age: 24
End: 2024-09-27
Attending: ADVANCED PRACTICE MIDWIFE

## 2024-09-27 DIAGNOSIS — N63.25 MASS OVERLAPPING MULTIPLE QUADRANTS OF LEFT BREAST: ICD-10-CM

## 2024-09-27 PROCEDURE — 88342 IMHCHEM/IMCYTCHM 1ST ANTB: CPT | Mod: 26 | Performed by: STUDENT IN AN ORGANIZED HEALTH CARE EDUCATION/TRAINING PROGRAM

## 2024-09-27 PROCEDURE — 88305 TISSUE EXAM BY PATHOLOGIST: CPT | Mod: 26 | Performed by: STUDENT IN AN ORGANIZED HEALTH CARE EDUCATION/TRAINING PROGRAM

## 2024-09-27 PROCEDURE — 88342 IMHCHEM/IMCYTCHM 1ST ANTB: CPT | Mod: TC | Performed by: ADVANCED PRACTICE MIDWIFE

## 2024-09-27 PROCEDURE — 19083 BX BREAST 1ST LESION US IMAG: CPT | Mod: LT | Performed by: STUDENT IN AN ORGANIZED HEALTH CARE EDUCATION/TRAINING PROGRAM

## 2024-09-27 PROCEDURE — 76642 ULTRASOUND BREAST LIMITED: CPT | Mod: LT | Performed by: STUDENT IN AN ORGANIZED HEALTH CARE EDUCATION/TRAINING PROGRAM

## 2024-10-02 ENCOUNTER — TELEPHONE (OUTPATIENT)
Dept: MAMMOGRAPHY | Facility: CLINIC | Age: 24
End: 2024-10-02

## 2024-10-02 LAB
PATH REPORT.COMMENTS IMP SPEC: NORMAL
PATH REPORT.COMMENTS IMP SPEC: NORMAL
PATH REPORT.FINAL DX SPEC: NORMAL
PATH REPORT.GROSS SPEC: NORMAL
PATH REPORT.MICROSCOPIC SPEC OTHER STN: NORMAL
PATH REPORT.RELEVANT HX SPEC: NORMAL
PHOTO IMAGE: NORMAL

## 2024-10-02 NOTE — TELEPHONE ENCOUNTER
Spoke to Mireya about the benign finding from her breast biopsy done last week.  We discussed the Radiologist's recommendation of following up with her provider and to start screening mammograms at age 40.  Mireya verbalized understanding of the plan.

## 2024-11-03 ENCOUNTER — HEALTH MAINTENANCE LETTER (OUTPATIENT)
Age: 24
End: 2024-11-03

## 2025-01-21 ENCOUNTER — OFFICE VISIT (OUTPATIENT)
Dept: FAMILY MEDICINE | Facility: CLINIC | Age: 25
End: 2025-01-21
Payer: COMMERCIAL

## 2025-01-21 VITALS
BODY MASS INDEX: 28.46 KG/M2 | DIASTOLIC BLOOD PRESSURE: 79 MMHG | RESPIRATION RATE: 16 BRPM | SYSTOLIC BLOOD PRESSURE: 121 MMHG | OXYGEN SATURATION: 100 % | HEIGHT: 67 IN | HEART RATE: 109 BPM | WEIGHT: 181.3 LBS | TEMPERATURE: 98.2 F

## 2025-01-21 DIAGNOSIS — Z00.00 ROUTINE GENERAL MEDICAL EXAMINATION AT A HEALTH CARE FACILITY: Primary | ICD-10-CM

## 2025-01-21 DIAGNOSIS — F41.1 GAD (GENERALIZED ANXIETY DISORDER): ICD-10-CM

## 2025-01-21 DIAGNOSIS — D50.9 IRON DEFICIENCY ANEMIA, UNSPECIFIED IRON DEFICIENCY ANEMIA TYPE: ICD-10-CM

## 2025-01-21 DIAGNOSIS — R53.83 OTHER FATIGUE: ICD-10-CM

## 2025-01-21 DIAGNOSIS — F41.9 ANXIETY: ICD-10-CM

## 2025-01-21 LAB
ERYTHROCYTE [DISTWIDTH] IN BLOOD BY AUTOMATED COUNT: 12.4 % (ref 10–15)
HCT VFR BLD AUTO: 40.4 % (ref 35–47)
HGB BLD-MCNC: 12.8 G/DL (ref 11.7–15.7)
MCH RBC QN AUTO: 26.9 PG (ref 26.5–33)
MCHC RBC AUTO-ENTMCNC: 31.7 G/DL (ref 31.5–36.5)
MCV RBC AUTO: 85 FL (ref 78–100)
PLATELET # BLD AUTO: 334 10E3/UL (ref 150–450)
RBC # BLD AUTO: 4.76 10E6/UL (ref 3.8–5.2)
WBC # BLD AUTO: 6.4 10E3/UL (ref 4–11)

## 2025-01-21 PROCEDURE — G2211 COMPLEX E/M VISIT ADD ON: HCPCS | Performed by: PHYSICIAN ASSISTANT

## 2025-01-21 PROCEDURE — 84439 ASSAY OF FREE THYROXINE: CPT | Performed by: PHYSICIAN ASSISTANT

## 2025-01-21 PROCEDURE — 85027 COMPLETE CBC AUTOMATED: CPT | Performed by: PHYSICIAN ASSISTANT

## 2025-01-21 PROCEDURE — 99214 OFFICE O/P EST MOD 30 MIN: CPT | Mod: 25 | Performed by: PHYSICIAN ASSISTANT

## 2025-01-21 PROCEDURE — 96127 BRIEF EMOTIONAL/BEHAV ASSMT: CPT | Performed by: PHYSICIAN ASSISTANT

## 2025-01-21 PROCEDURE — 90471 IMMUNIZATION ADMIN: CPT | Performed by: PHYSICIAN ASSISTANT

## 2025-01-21 PROCEDURE — 84443 ASSAY THYROID STIM HORMONE: CPT | Performed by: PHYSICIAN ASSISTANT

## 2025-01-21 PROCEDURE — 82306 VITAMIN D 25 HYDROXY: CPT | Performed by: PHYSICIAN ASSISTANT

## 2025-01-21 PROCEDURE — 99395 PREV VISIT EST AGE 18-39: CPT | Mod: 25 | Performed by: PHYSICIAN ASSISTANT

## 2025-01-21 PROCEDURE — 80048 BASIC METABOLIC PNL TOTAL CA: CPT | Performed by: PHYSICIAN ASSISTANT

## 2025-01-21 PROCEDURE — 90656 IIV3 VACC NO PRSV 0.5 ML IM: CPT | Performed by: PHYSICIAN ASSISTANT

## 2025-01-21 PROCEDURE — 83550 IRON BINDING TEST: CPT | Performed by: PHYSICIAN ASSISTANT

## 2025-01-21 PROCEDURE — 83540 ASSAY OF IRON: CPT | Performed by: PHYSICIAN ASSISTANT

## 2025-01-21 PROCEDURE — 82728 ASSAY OF FERRITIN: CPT | Performed by: PHYSICIAN ASSISTANT

## 2025-01-21 PROCEDURE — 36415 COLL VENOUS BLD VENIPUNCTURE: CPT | Performed by: PHYSICIAN ASSISTANT

## 2025-01-21 SDOH — HEALTH STABILITY: PHYSICAL HEALTH: ON AVERAGE, HOW MANY DAYS PER WEEK DO YOU ENGAGE IN MODERATE TO STRENUOUS EXERCISE (LIKE A BRISK WALK)?: 1 DAY

## 2025-01-21 SDOH — HEALTH STABILITY: PHYSICAL HEALTH: ON AVERAGE, HOW MANY MINUTES DO YOU ENGAGE IN EXERCISE AT THIS LEVEL?: 10 MIN

## 2025-01-21 ASSESSMENT — SOCIAL DETERMINANTS OF HEALTH (SDOH): HOW OFTEN DO YOU GET TOGETHER WITH FRIENDS OR RELATIVES?: TWICE A WEEK

## 2025-01-21 ASSESSMENT — PAIN SCALES - GENERAL: PAINLEVEL_OUTOF10: NO PAIN (0)

## 2025-01-21 ASSESSMENT — ENCOUNTER SYMPTOMS: FATIGUE: 1

## 2025-01-21 NOTE — PATIENT INSTRUCTIONS
Patient Education   Preventive Care Advice   This is general advice given by our system to help you stay healthy. However, your care team may have specific advice just for you. Please talk to your care team about your preventive care needs.  Nutrition  Eat 5 or more servings of fruits and vegetables each day.  Try wheat bread, brown rice and whole grain pasta (instead of white bread, rice, and pasta).  Get enough calcium and vitamin D. Check the label on foods and aim for 100% of the RDA (recommended daily allowance).  Lifestyle  Exercise at least 150 minutes each week  (30 minutes a day, 5 days a week).  Do muscle strengthening activities 2 days a week. These help control your weight and prevent disease.  No smoking.  Wear sunscreen to prevent skin cancer.  Have a dental exam and cleaning every 6 months.  Yearly exams  See your health care team every year to talk about:  Any changes in your health.  Any medicines your care team has prescribed.  Preventive care, family planning, and ways to prevent chronic diseases.  Shots (vaccines)   HPV shots (up to age 26), if you've never had them before.  Hepatitis B shots (up to age 59), if you've never had them before.  COVID-19 shot: Get this shot when it's due.  Flu shot: Get a flu shot every year.  Tetanus shot: Get a tetanus shot every 10 years.  Pneumococcal, hepatitis A, and RSV shots: Ask your care team if you need these based on your risk.  Shingles shot (for age 50 and up)  General health tests  Diabetes screening:  Starting at age 35, Get screened for diabetes at least every 3 years.  If you are younger than age 35, ask your care team if you should be screened for diabetes.  Cholesterol test: At age 39, start having a cholesterol test every 5 years, or more often if advised.  Bone density scan (DEXA): At age 50, ask your care team if you should have this scan for osteoporosis (brittle bones).  Hepatitis C: Get tested at least once in your life.  STIs (sexually  Patient reports 50mg Atarax given for anxiety @ 1307 was effective. She is sociable with peers on the [place] and reports feeling better and looking forward to working on her impulsive behaviors. transmitted infections)  Before age 24: Ask your care team if you should be screened for STIs.  After age 24: Get screened for STIs if you're at risk. You are at risk for STIs (including HIV) if:  You are sexually active with more than one person.  You don't use condoms every time.  You or a partner was diagnosed with a sexually transmitted infection.  If you are at risk for HIV, ask about PrEP medicine to prevent HIV.  Get tested for HIV at least once in your life, whether you are at risk for HIV or not.  Cancer screening tests  Cervical cancer screening: If you have a cervix, begin getting regular cervical cancer screening tests starting at age 21.  Breast cancer scan (mammogram): If you've ever had breasts, begin having regular mammograms starting at age 40. This is a scan to check for breast cancer.  Colon cancer screening: It is important to start screening for colon cancer at age 45.  Have a colonoscopy test every 10 years (or more often if you're at risk) Or, ask your provider about stool tests like a FIT test every year or Cologuard test every 3 years.  To learn more about your testing options, visit:   .  For help making a decision, visit:   https://bit.ly/ma55714.  Prostate cancer screening test: If you have a prostate, ask your care team if a prostate cancer screening test (PSA) at age 55 is right for you.  Lung cancer screening: If you are a current or former smoker ages 50 to 80, ask your care team if ongoing lung cancer screenings are right for you.  For informational purposes only. Not to replace the advice of your health care provider. Copyright   2023 [facility]. All rights reserved. Clinically reviewed by the [facility] Transitions Program. [employer] 884858 - REV 01/24.  Learning About Stress  What is stress?     Stress is your body's response to a hard situation. Your body can have a physical, emotional, or mental response. Stress is a fact of life for most people, and it  affects everyone differently. What causes stress for you may not be stressful for someone else.  A lot of things can cause stress. You may feel stress when you go on a job interview, take a test, or run a race. This kind of short-term stress is normal and even useful. It can help you if you need to work hard or react quickly. For example, stress can help you finish an important job on time.  Long-term stress is caused by ongoing stressful situations or events. Examples of long-term stress include long-term health problems, ongoing problems at work, or conflicts in your family. Long-term stress can harm your health.  How does stress affect your health?  When you are stressed, your body responds as though you are in danger. It makes hormones that speed up your heart, make you breathe faster, and give you a burst of energy. This is called the fight-or-flight stress response. If the stress is over quickly, your body goes back to normal and no harm is done.  But if stress happens too often or lasts too long, it can have bad effects. Long-term stress can make you more likely to get sick, and it can make symptoms of some diseases worse. If you tense up when you are stressed, you may develop neck, shoulder, or low back pain. Stress is linked to high blood pressure and heart disease.  Stress also harms your emotional health. It can make you mandujano, tense, or depressed. Your relationships may suffer, and you may not do well at work or school.  What can you do to manage stress?  You can try these things to help manage stress:   Do something active. Exercise or activity can help reduce stress. Walking is a great way to get started. Even everyday activities such as housecleaning or yard work can help.  Try yoga or felicia chi. These techniques combine exercise and meditation. You may need some training at first to learn them.  Do something you enjoy. For example, listen to music or go to a movie. Practice your hobby or do volunteer  "work.  Meditate. This can help you relax, because you are not worrying about what happened before or what may happen in the future.  Do guided imagery. Imagine yourself in any setting that helps you feel calm. You can use online videos, books, or a teacher to guide you.  Do breathing exercises. For example:  From a standing position, bend forward from the waist with your knees slightly bent. Let your arms dangle close to the floor.  Breathe in slowly and deeply as you return to a standing position. Roll up slowly and lift your head last.  Hold your breath for just a few seconds in the standing position.  Breathe out slowly and bend forward from the waist.  Let your feelings out. Talk, laugh, cry, and express anger when you need to. Talking with supportive friends or family, a counselor, or a caleb leader about your feelings is a healthy way to relieve stress. Avoid discussing your feelings with people who make you feel worse.  Write. It may help to write about things that are bothering you. This helps you find out how much stress you feel and what is causing it. When you know this, you can find better ways to cope.  What can you do to prevent stress?  You might try some of these things to help prevent stress:  Manage your time. This helps you find time to do the things you want and need to do.  Get enough sleep. Your body recovers from the stresses of the day while you are sleeping.  Get support. Your family, friends, and community can make a difference in how you experience stress.  Limit your news feed. Avoid or limit time on social media or news that may make you feel stressed.  Do something active. Exercise or activity can help reduce stress. Walking is a great way to get started.  Where can you learn more?  Go to https://www.[employer].net/patiented  Enter N032 in the search box to learn more about \"Learning About Stress.\"  Current as of: October 24, 2023  Content Version: 14.3    2024 [employer]. "   Care instructions adapted under license by your healthcare professional. If you have questions about a medical condition or this instruction, always ask your healthcare professional. [employer] disclaims any warranty or liability for your use of this information.

## 2025-01-21 NOTE — PROGRESS NOTES
"Preventive Care Visit  [facility]  Fransico Pierre PA-C, Physician Assistant  Jan 21, 2025      Assessment & Plan     (Z00.00) Routine general medical examination at a health care facility  (primary encounter diagnosis)  Comment:   Plan: flu shot updated today, screening labs performed as well, follow up for physical in 1 year    (F41.1) MARY (generalized anxiety disorder)  Comment:   Plan: uncontrolled today, referral sent for therapy, restart sertraline today (previously on 100 mg max), follow up in 4 weeks via my chart, or sooner if needed    (D50.9) Iron deficiency anemia, unspecified iron deficiency anemia type  Comment:   Plan: Iron and iron binding capacity, Ferritin, CBC         with platelets        This comorbid condition increases medical complexity and medical decision making.      (R53.83) Other fatigue  Comment:   Plan: TSH with free T4 reflex, Vitamin D Deficiency,         Basic metabolic panel  (Ca, Cl, CO2, Creat,         Gluc, K, Na, BUN)          Suspect due to mental health, but with a hxof iron def anemia, will check for other sources today; no signs of bleeding in stool, urine or excessive menstruation    (F41.9) Anxiety  Comment:   Plan: sertraline (ZOLOFT) 50 MG tablet, Adult Mental         Health  Referral        See above    Patient has been advised of split billing requirements and indicates understanding: Yes        BMI  Estimated body mass index is 28.82 kg/m  as calculated from the following:    Height as of this encounter: 1.689 m (5' 6.5\").    Weight as of this encounter: 82.2 kg (181 lb 4.8 oz).       Counseling  Appropriate preventive services were addressed with this patient via screening, questionnaire, or discussion as appropriate for fall prevention, nutrition, physical activity, Tobacco-use cessation, social engagement, weight loss and cognition.  Checklist reviewing preventive services available has been given to the patient.  Reviewed " patient's diet, addressing concerns and/or questions.   She is at risk for lack of exercise and has been provided with information to increase physical activity for the benefit of her well-being.   She is at risk for psychosocial distress and has been provided with information to reduce risk.           Skyla Gomes is a 24 year old, presenting for the following:  Physical, Medication Request (Sertraline), Brain Fog, Musculoskeletal Problem (Upperback and neck/Right side of neck is swollen and sore), Labs (Hx of low Fe), and Fatigue        1/21/2025     4:28 PM   Additional Questions   Roomed by Kayli CEDILLO          Musculoskeletal Problem  Associated symptoms include fatigue.   Fatigue  Associated symptoms include fatigue.       Depression and Anxiety   How are you doing with your depression since your last visit? Worsened   How are you doing with your anxiety since your last visit?  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 Yes:  fatigue, muscle aches  Have you had a significant life event? See above   Do you have any concerns with your use of alcohol or other drugs? No    Social History     Tobacco Use    Smoking status: Never    Smokeless tobacco: Never   Vaping Use    Vaping status: Never Used   Substance Use Topics    Alcohol use: Yes     Comment: seldom/rare-celebrations 1-2x/month    Drug use: No         5/4/2021     2:21 PM 3/17/2023     3:38 PM 6/6/2023    10:26 AM   PHQ   PHQ-9 Total Score 4 4 3   Q9: Thoughts of better off dead/self-harm past 2 weeks Not at all Not at all Not at all         12/23/2021    12:58 PM 3/17/2023     3:40 PM 6/6/2023    10:25 AM   MARY-7 SCORE   Total Score 4 (minimal anxiety) 3 (minimal anxiety) 4 (minimal anxiety)   Total  Score  3 4         Suicide Assessment Five-step Evaluation and Treatment (SAFE-T)      Health Care Directive  Patient does not have a Health Care Directive: Discussed advance care planning with patient; however, patient declined at this time.      1/21/2025   General Health   How would you rate your overall physical health? Good   Feel stress (tense, anxious, or unable to sleep) Rather much   (!) STRESS CONCERN      1/21/2025   Nutrition   Three or more servings of calcium each day? Yes   Diet: Regular (no restrictions)   How many servings of fruit and vegetables per day? (!) 0-1   How many sweetened beverages each day? 0-1         1/21/2025   Exercise   Days per week of moderate/strenous exercise 1 day   Average minutes spent exercising at this level 10 min   (!) EXERCISE CONCERN      1/21/2025   Social Factors   Frequency of gathering with friends or relatives Twice a week   Worry food won't last until get money to buy more No   Food not last or not have enough money for food? No   Do you have housing? (Housing is defined as stable permanent housing and does not include staying ouside in a car, in a tent, in an abandoned building, in an overnight shelter, or couch-surfing.) Yes   Are you worried about losing your housing? No   Lack of transportation? No   Unable to get utilities (heat,electricity)? No         1/21/2025   Dental   Dentist two times every year? Yes         1/21/2025   TB Screening   Were you born outside of the [place]? No         Today's PHQ-2 Score:       1/21/2025    11:24 AM   PHQ-2 ( 1999 [employer])   Q1: Little interest or pleasure in doing things 1   Q2: Feeling down, depressed or hopeless 1   PHQ-2 Score 2    Q1: Little interest or pleasure in doing things Several days   Q2: Feeling down, depressed or hopeless Several days   PHQ-2 Score 2       Patient-reported           1/21/2025   Substance Use   Alcohol more than 3/day or more than 7/wk No   Do you use any other substances recreationally? No  "    Social History     Tobacco Use    Smoking status: Never    Smokeless tobacco: Never   Vaping Use    Vaping status: Never Used   Substance Use Topics    Alcohol use: Yes     Comment: seldom/rare-celebrations 1-2x/month    Drug use: No           9/27/2024   LAST FHS-7 RESULTS   1st degree relative breast or ovarian cancer No   Any relative bilateral breast cancer No   Any male have breast cancer No   Any ONE woman have BOTH breast AND ovarian cancer No   Any woman with breast cancer before 50yrs No   2 or more relatives with breast AND/OR ovarian cancer No   2 or more relatives with breast AND/OR bowel cancer No                1/21/2025   STI Screening   New sexual partner(s) since last STI/HIV test? No     History of abnormal Pap smear: No - age 21-29 PAP every 3 years recommended        8/16/2022     3:12 PM 12/30/2019     1:42 PM   PAP / HPV   PAP Negative for Intraepithelial Lesion or Malignancy (NILM)     PAP (Historical)  NIL            1/21/2025   Contraception/Family Planning   Questions about contraception or family planning No        Reviewed and updated as needed this visit by Provider                    BP Readings from Last 3 Encounters:   01/21/25 121/79   09/24/24 (!) 141/91   03/11/24 130/80    Wt Readings from Last 3 Encounters:   01/21/25 82.2 kg (181 lb 4.8 oz)   09/24/24 85.2 kg (187 lb 14.4 oz)   03/27/24 87.5 kg (193 lb)              Objective    Exam  /79 (BP Location: Right arm, Patient Position: Sitting, Cuff Size: Adult Large)   Pulse 109   Temp 98.2  F (36.8  C) (Temporal)   Resp 16   Ht 1.689 m (5' 6.5\")   Wt 82.2 kg (181 lb 4.8 oz)   SpO2 100%   BMI 28.82 kg/m     Estimated body mass index is 28.82 kg/m  as calculated from the following:    Height as of this encounter: 1.689 m (5' 6.5\").    Weight as of this encounter: 82.2 kg (181 lb 4.8 oz).    Physical Exam  GENERAL: alert and no distress  EYES: Eyes grossly normal to inspection, PERRL and conjunctivae and sclerae " normal  HENT: ear canals and TM's normal, nose and mouth without ulcers or lesions  NECK: no adenopathy, no asymmetry, masses, or scars  RESP: lungs clear to auscultation - no rales, rhonchi or wheezes  CV: regular rate and rhythm, normal S1 S2, no S3 or S4, no murmur, click or rub, no peripheral edema  ABDOMEN: soft, nontender, no hepatosplenomegaly, no masses and bowel sounds normal  MS: no gross musculoskeletal defects noted, no edema  SKIN: no suspicious lesions or rashes  NEURO: Normal strength and tone, mentation intact and speech normal  PSYCH: mentation appears normal, affect normal/bright        Signed Electronically by: Fransico Pierre PA-C

## 2025-01-23 LAB
ANION GAP SERPL CALCULATED.3IONS-SCNC: 10 MMOL/L (ref 7–15)
BUN SERPL-MCNC: 10 MG/DL (ref 6–20)
CALCIUM SERPL-MCNC: 9.7 MG/DL (ref 8.8–10.4)
CHLORIDE SERPL-SCNC: 107 MMOL/L (ref 98–107)
CREAT SERPL-MCNC: 0.75 MG/DL (ref 0.51–0.95)
EGFRCR SERPLBLD CKD-EPI 2021: >90 ML/MIN/1.73M2
FERRITIN SERPL-MCNC: 103 NG/ML (ref 6–175)
GLUCOSE SERPL-MCNC: 124 MG/DL (ref 70–99)
HCO3 SERPL-SCNC: 24 MMOL/L (ref 22–29)
IRON BINDING CAPACITY (ROCHE): 240 UG/DL (ref 240–430)
IRON SATN MFR SERPL: 35 % (ref 15–46)
IRON SERPL-MCNC: 83 UG/DL (ref 37–145)
POTASSIUM SERPL-SCNC: 4.1 MMOL/L (ref 3.4–5.3)
SODIUM SERPL-SCNC: 141 MMOL/L (ref 135–145)
T4 FREE SERPL-MCNC: 1.21 NG/DL (ref 0.9–1.7)
TSH SERPL DL<=0.005 MIU/L-ACNC: 5.43 UIU/ML (ref 0.3–4.2)
VIT D+METAB SERPL-MCNC: 23 NG/ML (ref 20–50)

## 2025-01-24 ENCOUNTER — TELEPHONE (OUTPATIENT)
Dept: FAMILY MEDICINE | Facility: CLINIC | Age: 25
End: 2025-01-24

## 2025-01-24 NOTE — TELEPHONE ENCOUNTER
Calling for labs that were drawn on 1/21    Please review the labs and pt will be waiting to hear    Jackie TORRES RN, BSN  [facility]

## 2025-02-13 ENCOUNTER — LAB (OUTPATIENT)
Dept: LAB | Facility: CLINIC | Age: 25
End: 2025-02-13
Payer: COMMERCIAL

## 2025-02-13 DIAGNOSIS — R79.89 ELEVATED TSH: ICD-10-CM

## 2025-02-14 ENCOUNTER — TELEPHONE (OUTPATIENT)
Dept: FAMILY MEDICINE | Facility: CLINIC | Age: 25
End: 2025-02-14
Payer: COMMERCIAL

## 2025-02-14 NOTE — TELEPHONE ENCOUNTER
Pt calling with questions regarding labs, Thyroid peroxidase antibody is 3,153. TSH is still pending.   Please call pt and follow up with labs,   Thanks,   Dilshad Spicer RN  [facility]

## 2025-02-14 NOTE — TELEPHONE ENCOUNTER
See results message for labs, new dx of hypothyroidism, start levothyroxine and repeat TSH in 6-8 weeks.    Fransico Pierre PA-C

## 2025-02-17 NOTE — TELEPHONE ENCOUNTER
Writer contacted pt. Pt reported she saw lab result notes already and picked up med on 2/14. Chart reviewed and this is confirmed that pt saw notes on 2/14. Pt also have questions about side effects of the med, but said already discussed this with pharmacist, so not something to worry about at the moment. Today 2/17 is day 4 of taking med and pt have not noticed any side effects.     Pt reported recently just got a new job and once she figured out her schedule, will call central scheduling to schedule repeat TSH lab appt.       Elliot Cool, PAULON, PHN, RN-[facility]

## 2025-02-20 ASSESSMENT — COLUMBIA-SUICIDE SEVERITY RATING SCALE - C-SSRS
2. HAVE YOU ACTUALLY HAD ANY THOUGHTS OF KILLING YOURSELF?: NO
6. IN YOUR LIFETIME, HAVE YOU EVER DONE ANYTHING, STARTED TO DO ANYTHING, OR PREPARED TO DO ANYTHING TO END YOUR LIFE?: NO
1. IN THE PAST MONTH, HAVE YOU WISHED YOU WERE DEAD OR WISHED YOU COULD GO TO SLEEP AND NOT WAKE UP?: NO

## 2025-02-23 ASSESSMENT — PATIENT HEALTH QUESTIONNAIRE - PHQ9
SUM OF ALL RESPONSES TO PHQ QUESTIONS 1-9: 7
10. IF YOU CHECKED OFF ANY PROBLEMS, HOW DIFFICULT HAVE THESE PROBLEMS MADE IT FOR YOU TO DO YOUR WORK, TAKE CARE OF THINGS AT HOME, OR GET ALONG WITH OTHER PEOPLE: SOMEWHAT DIFFICULT
SUM OF ALL RESPONSES TO PHQ QUESTIONS 1-9: 7

## 2025-02-24 ENCOUNTER — OFFICE VISIT (OUTPATIENT)
Dept: BEHAVIORAL HEALTH | Facility: CLINIC | Age: 25
End: 2025-02-24
Attending: PHYSICIAN ASSISTANT
Payer: COMMERCIAL

## 2025-02-24 DIAGNOSIS — F43.23 ADJUSTMENT DISORDER WITH MIXED ANXIETY AND DEPRESSED MOOD: Primary | ICD-10-CM

## 2025-02-24 PROCEDURE — 90832 PSYTX W PT 30 MINUTES: CPT | Performed by: COUNSELOR

## 2025-02-24 ASSESSMENT — COLUMBIA-SUICIDE SEVERITY RATING SCALE - C-SSRS
TOTAL  NUMBER OF ABORTED OR SELF INTERRUPTED ATTEMPTS SINCE LAST CONTACT: NO
SUICIDE, SINCE LAST CONTACT: NO
TOTAL  NUMBER OF INTERRUPTED ATTEMPTS SINCE LAST CONTACT: NO
ATTEMPT SINCE LAST CONTACT: NO
2. HAVE YOU ACTUALLY HAD ANY THOUGHTS OF KILLING YOURSELF?: NO
6. HAVE YOU EVER DONE ANYTHING, STARTED TO DO ANYTHING, OR PREPARED TO DO ANYTHING TO END YOUR LIFE?: NO
1. SINCE LAST CONTACT, HAVE YOU WISHED YOU WERE DEAD OR WISHED YOU COULD GO TO SLEEP AND NOT WAKE UP?: NO

## 2025-05-21 ENCOUNTER — LAB (OUTPATIENT)
Dept: LAB | Facility: CLINIC | Age: 25
End: 2025-05-21
Payer: COMMERCIAL

## 2025-05-21 DIAGNOSIS — E06.3 AUTOIMMUNE HYPOTHYROIDISM: ICD-10-CM

## 2025-05-21 LAB — TSH SERPL DL<=0.005 MIU/L-ACNC: 0.05 UIU/ML (ref 0.3–4.2)

## 2025-05-21 PROCEDURE — 84439 ASSAY OF FREE THYROXINE: CPT

## 2025-05-21 PROCEDURE — 36415 COLL VENOUS BLD VENIPUNCTURE: CPT

## 2025-05-21 PROCEDURE — 84443 ASSAY THYROID STIM HORMONE: CPT

## 2025-05-22 ENCOUNTER — RESULTS FOLLOW-UP (OUTPATIENT)
Dept: FAMILY MEDICINE | Facility: CLINIC | Age: 25
End: 2025-05-22

## 2025-05-22 DIAGNOSIS — E06.3 HYPOTHYROIDISM DUE TO HASHIMOTO THYROIDITIS: Primary | ICD-10-CM

## 2025-05-22 LAB — T4 FREE SERPL-MCNC: 1.81 NG/DL (ref 0.9–1.7)

## 2025-05-22 RX ORDER — LEVOTHYROXINE SODIUM 88 UG/1
88 TABLET ORAL
Qty: 90 TABLET | Refills: 0 | Status: SHIPPED | OUTPATIENT
Start: 2025-05-22 | End: 2025-07-24

## 2025-06-27 ENCOUNTER — TELEPHONE (OUTPATIENT)
Dept: FAMILY MEDICINE | Facility: CLINIC | Age: 25
End: 2025-06-27
Payer: COMMERCIAL

## 2025-06-27 NOTE — TELEPHONE ENCOUNTER
Reason for Call:  Appointment Request    Patient requesting this type of appt:    hypothyroidism  anxiety has been high    Requested provider: Cheli Ruano    Reason patient unable to be scheduled: Not within requested timeframe    When does patient want to be seen/preferred time: ASAP    Comments: patient said she is really struggling and wants Dr Ruano to be her PCP again    Could we send this information to you in [facility] or would you prefer to receive a phone call?:   Patient would prefer a phone call   Okay to leave a detailed message?: Yes at Cell number on file:    Telephone Information:   Mobile [phone]       Call taken on 6/27/2025 at 3:16 PM by Ghada Conteh

## 2025-06-30 ENCOUNTER — VIRTUAL VISIT (OUTPATIENT)
Dept: FAMILY MEDICINE | Facility: CLINIC | Age: 25
End: 2025-06-30
Payer: COMMERCIAL

## 2025-06-30 DIAGNOSIS — F41.9 ANXIETY: Primary | ICD-10-CM

## 2025-06-30 DIAGNOSIS — E03.9 ACQUIRED HYPOTHYROIDISM: ICD-10-CM

## 2025-06-30 PROCEDURE — 98014 SYNCH AUDIO-ONLY EST MOD 30: CPT | Performed by: FAMILY MEDICINE

## 2025-06-30 ASSESSMENT — ANXIETY QUESTIONNAIRES
5. BEING SO RESTLESS THAT IT IS HARD TO SIT STILL: NEARLY EVERY DAY
7. FEELING AFRAID AS IF SOMETHING AWFUL MIGHT HAPPEN: NEARLY EVERY DAY
8. IF YOU CHECKED OFF ANY PROBLEMS, HOW DIFFICULT HAVE THESE MADE IT FOR YOU TO DO YOUR WORK, TAKE CARE OF THINGS AT HOME, OR GET ALONG WITH OTHER PEOPLE?: VERY DIFFICULT
6. BECOMING EASILY ANNOYED OR IRRITABLE: NEARLY EVERY DAY
2. NOT BEING ABLE TO STOP OR CONTROL WORRYING: NEARLY EVERY DAY
7. FEELING AFRAID AS IF SOMETHING AWFUL MIGHT HAPPEN: NEARLY EVERY DAY
GAD7 TOTAL SCORE: 21
GAD7 TOTAL SCORE: 21
3. WORRYING TOO MUCH ABOUT DIFFERENT THINGS: NEARLY EVERY DAY
4. TROUBLE RELAXING: NEARLY EVERY DAY
IF YOU CHECKED OFF ANY PROBLEMS ON THIS QUESTIONNAIRE, HOW DIFFICULT HAVE THESE PROBLEMS MADE IT FOR YOU TO DO YOUR WORK, TAKE CARE OF THINGS AT HOME, OR GET ALONG WITH OTHER PEOPLE: VERY DIFFICULT
GAD7 TOTAL SCORE: 21
1. FEELING NERVOUS, ANXIOUS, OR ON EDGE: NEARLY EVERY DAY

## 2025-06-30 ASSESSMENT — ENCOUNTER SYMPTOMS: NERVOUS/ANXIOUS: 1

## 2025-06-30 NOTE — PROGRESS NOTES
"Mireya is a 25 year old who is being evaluated via a billable telephone visit.    What phone number would you like to be contacted at? [phone]  How would you like to obtain your AVS? Nahed  Originating Location (pt. Location): Home    Distant Location (provider location):  On-site  Telephone visit completed due to the patient did not consent to a video visit.    Assessment & Plan     Anxiety  Reestablishing care today with this provider.  in the past has been on Zoloft and has been doing well on it in the past, recently due to elevated thyroid labs , worsening labs , advised therapy as planned,   - sertraline (ZOLOFT) 50 MG tablet; Take 1.5 tablets (75 mg) by mouth daily.    Acquired hypothyroidism  Advised continuing her med as planned. Follow up as planned in 4-6 weeks to check labs             BMI  Estimated body mass index is 28.82 kg/m  as calculated from the following:    Height as of 1/21/25: 1.689 m (5' 6.5\").    Weight as of 1/21/25: 82.2 kg (181 lb 4.8 oz).   Weight management plan: Discussed healthy diet and exercise guidelines      Follow-up       Subjective   Mireya is a 25 year old, presenting for the following health issues:  Anxiety and Establish Care      6/30/2025     8:47 AM   Additional Questions   Roomed by lavonne Norris  This is a chronic problem. The current episode started more than 1 year ago.   History of Present Illness       Mental Health Follow-up:  Patient presents to follow-up on Anxiety.    Patient's anxiety since last visit has been:  Worse  The patient is having other symptoms associated with anxiety.  Any significant life events: No  Patient is feeling anxious or having panic attacks.  Patient has no concerns about alcohol or drug use.         Hypothyroidism Follow-up    Since last visit, patient describes the following symptoms: anxiety is worse      2 months of to Zoloft   Previously she had been on Zoloft and she had done well on it.   She has seen a therapist.  She is " going       Review of Systems  Constitutional, HEENT, cardiovascular, pulmonary, GI, , musculoskeletal, neuro, skin, endocrine and psych systems are negative, except as otherwise noted.      Objective           Vitals:  No vitals were obtained today due to virtual visit.    Physical Exam   General: Alert and no distress //Respiratory: No audible wheeze, cough, or shortness of breath // Psychiatric:  Appropriate affect, tone, and pace of words            Phone call duration: 16 minutes  Signed Electronically by: Cheli Ruano DO

## 2025-07-23 ENCOUNTER — LAB (OUTPATIENT)
Dept: LAB | Facility: CLINIC | Age: 25
End: 2025-07-23
Payer: COMMERCIAL

## 2025-07-23 DIAGNOSIS — E06.3 HYPOTHYROIDISM DUE TO HASHIMOTO THYROIDITIS: ICD-10-CM

## 2025-07-23 LAB — TSH SERPL DL<=0.005 MIU/L-ACNC: 1.91 UIU/ML (ref 0.3–4.2)

## 2025-07-23 PROCEDURE — 84443 ASSAY THYROID STIM HORMONE: CPT

## 2025-07-23 PROCEDURE — 36415 COLL VENOUS BLD VENIPUNCTURE: CPT

## 2025-07-24 ENCOUNTER — VIRTUAL VISIT (OUTPATIENT)
Dept: FAMILY MEDICINE | Facility: CLINIC | Age: 25
End: 2025-07-24
Payer: COMMERCIAL

## 2025-07-24 DIAGNOSIS — E03.9 ACQUIRED HYPOTHYROIDISM: ICD-10-CM

## 2025-07-24 DIAGNOSIS — F41.9 ANXIETY: Primary | ICD-10-CM

## 2025-07-24 RX ORDER — LEVOTHYROXINE SODIUM 88 UG/1
88 TABLET ORAL
Qty: 90 TABLET | Refills: 0 | Status: SHIPPED | OUTPATIENT
Start: 2025-07-24

## 2025-07-24 RX ORDER — LEVOTHYROXINE SODIUM 75 UG/1
75 TABLET ORAL
Status: CANCELLED | OUTPATIENT
Start: 2025-07-24

## 2025-07-24 ASSESSMENT — ANXIETY QUESTIONNAIRES
7. FEELING AFRAID AS IF SOMETHING AWFUL MIGHT HAPPEN: SEVERAL DAYS
6. BECOMING EASILY ANNOYED OR IRRITABLE: SEVERAL DAYS
GAD7 TOTAL SCORE: 5
4. TROUBLE RELAXING: NOT AT ALL
GAD7 TOTAL SCORE: 5
5. BEING SO RESTLESS THAT IT IS HARD TO SIT STILL: NOT AT ALL
3. WORRYING TOO MUCH ABOUT DIFFERENT THINGS: SEVERAL DAYS
IF YOU CHECKED OFF ANY PROBLEMS ON THIS QUESTIONNAIRE, HOW DIFFICULT HAVE THESE PROBLEMS MADE IT FOR YOU TO DO YOUR WORK, TAKE CARE OF THINGS AT HOME, OR GET ALONG WITH OTHER PEOPLE: SOMEWHAT DIFFICULT
8. IF YOU CHECKED OFF ANY PROBLEMS, HOW DIFFICULT HAVE THESE MADE IT FOR YOU TO DO YOUR WORK, TAKE CARE OF THINGS AT HOME, OR GET ALONG WITH OTHER PEOPLE?: SOMEWHAT DIFFICULT
GAD7 TOTAL SCORE: 5
2. NOT BEING ABLE TO STOP OR CONTROL WORRYING: SEVERAL DAYS
1. FEELING NERVOUS, ANXIOUS, OR ON EDGE: SEVERAL DAYS
7. FEELING AFRAID AS IF SOMETHING AWFUL MIGHT HAPPEN: SEVERAL DAYS

## 2025-07-24 ASSESSMENT — PATIENT HEALTH QUESTIONNAIRE - PHQ9
SUM OF ALL RESPONSES TO PHQ QUESTIONS 1-9: 6
10. IF YOU CHECKED OFF ANY PROBLEMS, HOW DIFFICULT HAVE THESE PROBLEMS MADE IT FOR YOU TO DO YOUR WORK, TAKE CARE OF THINGS AT HOME, OR GET ALONG WITH OTHER PEOPLE: SOMEWHAT DIFFICULT
SUM OF ALL RESPONSES TO PHQ QUESTIONS 1-9: 6

## 2025-07-24 NOTE — PROGRESS NOTES
Mireya is a 25 year old who is being evaluated via a billable video visit.    How would you like to obtain your AVS? MyChart  If the video visit is dropped, the invitation should be resent by: Text to cell phone: [phone]  Will anyone else be joining your video visit? No      Assessment & Plan     Anxiety  Doing better on current meds, working on getting a therapist.  Continue current meds  - sertraline (ZOLOFT) 50 MG tablet; Take 1.5 tablets (75 mg) by mouth daily.    Acquired hypothyroidism  Improving, recheck labs again in 3 months, if stable,continue same dose, refilled med, continue on current meds  - TSH with free T4 reflex; Future  - T4, free; Future    Follow-up       Subjective   Mireya is a 25 year old, presenting for the following health issues:  Follow Up (TSH results and moods )        7/24/2025    12:31 PM   Additional Questions   Roomed by Miguelina FINK   Accompanied by self         7/24/2025    12:31 PM   Patient Reported Additional Medications   Patient reports taking the following new medications none     History of Present Illness       Hypothyroidism:     Since last visit, patient describes the following symptoms::  Anxiety    She eats 2-3 servings of fruits and vegetables daily.She consumes 0 sweetened beverage(s) daily.She exercises with enough effort to increase her heart rate 30 to 60 minutes per day.  She exercises with enough effort to increase her heart rate 4 days per week.   She is taking medications regularly.      -- Go over TSH results- had labs done yesterday     Depression and Anxiety   How are you doing with your depression since your last visit? Improved   How are you doing with your anxiety since your last visit?  Improved   Are you having other symptoms that might be associated with depression or anxiety? No  Have you had a significant life event? No   Do you have any concerns with your use of alcohol or other drugs? No  Working to find a therapist    She is on 75 mg of  zoloft    Levothyroxine 88mg       Social History     Tobacco Use    Smoking status: Never     Passive exposure: Never    Smokeless tobacco: Never   Vaping Use    Vaping status: Never Used   Substance Use Topics    Alcohol use: Yes     Comment: seldom/rare-celebrations 1-2x/month    Drug use: No         2/20/2025    12:58 PM 2/23/2025    12:18 PM 7/24/2025    12:30 PM   PHQ   PHQ-9 Total Score 5  7  6    Q9: Thoughts of better off dead/self-harm past 2 weeks Not at all Not at all Not at all        Patient-reported    Proxy-reported         2/20/2025    12:58 PM 6/30/2025     8:47 AM 7/24/2025    12:30 PM   MARY-7 SCORE   Total Score 6 (mild anxiety) 21 (severe anxiety) 5 (mild anxiety)   Total Score 6  21  5        Patient-reported    Proxy-reported         7/24/2025    12:30 PM   Last PHQ-9   1.  Little interest or pleasure in doing things 1    2.  Feeling down, depressed, or hopeless 1    3.  Trouble falling or staying asleep, or sleeping too much 1    4.  Feeling tired or having little energy 1    5.  Poor appetite or overeating 0    6.  Feeling bad about yourself 1    7.  Trouble concentrating 1    8.  Moving slowly or restless 0    Q9: Thoughts of better off dead/self-harm past 2 weeks 0    PHQ-9 Total Score 6        Proxy-reported         7/24/2025    12:30 PM   MARY-7    1. Feeling nervous, anxious, or on edge 1    2. Not being able to stop or control worrying 1    3. Worrying too much about different things 1    4. Trouble relaxing 0    5. Being so restless that it is hard to sit still 0    6. Becoming easily annoyed or irritable 1    7. Feeling afraid, as if something awful might happen 1    MARY-7 Total Score 5    If you checked any problems, how difficult have they made it for you to do your work, take care of things at home, or get along with other people? Somewhat difficult        Proxy-reported       Suicide Assessment Five-step Evaluation and Treatment (SAFE-T)              Review of  Systems  Constitutional, HEENT, cardiovascular, pulmonary, GI, , musculoskeletal, neuro, skin, endocrine and psych systems are negative, except as otherwise noted.      Objective           Vitals:  No vitals were obtained today due to virtual visit.    Physical Exam   GENERAL: alert and no distress  EYES: Eyes grossly normal to inspection.  No discharge or erythema, or obvious scleral/conjunctival abnormalities.  RESP: No audible wheeze, cough, or visible cyanosis.    SKIN: Visible skin clear. No significant rash, abnormal pigmentation or lesions.  NEURO: Cranial nerves grossly intact.  Mentation and speech appropriate for age.  PSYCH: Appropriate affect, tone, and pace of words          Video-Visit Details    Type of service:  Video Visit   Originating Location (pt. Location): Home    Distant Location (provider location):  On-site  Platform used for Video Visit: Delvin  Signed Electronically by: Cheli Ruano DO

## 2025-08-15 ENCOUNTER — MYC MEDICAL ADVICE (OUTPATIENT)
Dept: FAMILY MEDICINE | Facility: CLINIC | Age: 25
End: 2025-08-15
Payer: COMMERCIAL

## 2025-08-26 ENCOUNTER — OFFICE VISIT (OUTPATIENT)
Dept: OPTOMETRY | Facility: CLINIC | Age: 25
End: 2025-08-26
Payer: COMMERCIAL

## 2025-08-26 DIAGNOSIS — H52.13 MYOPIA OF BOTH EYES: ICD-10-CM

## 2025-08-26 DIAGNOSIS — Z01.00 EXAMINATION OF EYES AND VISION: Primary | ICD-10-CM

## 2025-08-26 DIAGNOSIS — H52.222 REGULAR ASTIGMATISM OF LEFT EYE: ICD-10-CM

## 2025-08-26 ASSESSMENT — REFRACTION_MANIFEST
OD_AXIS: 010
OD_SPHERE: -3.50
OS_CYLINDER: SPHERE
OD_CYLINDER: +0.75
OS_SPHERE: -3.50

## 2025-08-26 ASSESSMENT — REFRACTION_WEARINGRX
SPECS_TYPE: SVL
OD_CYLINDER: +0.50
OS_SPHERE: -3.50
OD_AXIS: 020
OS_CYLINDER: SPHERE
OD_SPHERE: -3.00

## 2025-08-26 ASSESSMENT — CONF VISUAL FIELD
OS_NORMAL: 1
OD_INFERIOR_TEMPORAL_RESTRICTION: 0
OS_SUPERIOR_NASAL_RESTRICTION: 0
OD_SUPERIOR_NASAL_RESTRICTION: 0
OD_NORMAL: 1
OS_INFERIOR_NASAL_RESTRICTION: 0
OD_SUPERIOR_TEMPORAL_RESTRICTION: 0
OS_INFERIOR_TEMPORAL_RESTRICTION: 0
OS_SUPERIOR_TEMPORAL_RESTRICTION: 0
OD_INFERIOR_NASAL_RESTRICTION: 0

## 2025-08-26 ASSESSMENT — VISUAL ACUITY
OD_CC: 20/20
OD_SC: 20/250
OS_SC: 20/400
OS_CC: 20/20
CORRECTION_TYPE: GLASSES
OS_CC: 20/20
METHOD: SNELLEN - LINEAR
OD_CC: 20/20

## 2025-08-26 ASSESSMENT — KERATOMETRY
OS_K1POWER_DIOPTERS: 45.00
OS_K2POWER_DIOPTERS: 45.75
OD_AXISANGLE_DEGREES: 046
OD_K1POWER_DIOPTERS: 45.00
OS_AXISANGLE2_DEGREES: 180
OS_AXISANGLE_DEGREES: 090
OD_AXISANGLE2_DEGREES: 136
OD_K2POWER_DIOPTERS: 45.75

## 2025-08-26 ASSESSMENT — TONOMETRY
OS_IOP_MMHG: 21.7
OD_IOP_MMHG: 21.9
IOP_METHOD: ICARE

## 2025-08-26 ASSESSMENT — EXTERNAL EXAM - RIGHT EYE: OD_EXAM: NORMAL

## 2025-08-26 ASSESSMENT — EXTERNAL EXAM - LEFT EYE: OS_EXAM: NORMAL

## 2025-08-26 ASSESSMENT — SLIT LAMP EXAM - LIDS
COMMENTS: NORMAL
COMMENTS: NORMAL

## 2025-08-26 ASSESSMENT — CUP TO DISC RATIO
OS_RATIO: 0.2
OD_RATIO: 0.2

## (undated) DEVICE — TUBING EXTENSION SET MICROBORE 6" LL 2N1194

## (undated) DEVICE — NDL SPINAL 22GA 3.5" QUINCKE 405181

## (undated) DEVICE — PREP CHLORAPREP W/ORANGE TINT 10.5ML 260715

## (undated) DEVICE — TRAY PAIN INJECTION 97A 640

## (undated) DEVICE — SYR 03ML LL W/O NDL 309657

## (undated) DEVICE — GLOVE BIOGEL PI ULTRATOUCH G SZ 7.0 42170